# Patient Record
Sex: FEMALE | Race: ASIAN | NOT HISPANIC OR LATINO
[De-identification: names, ages, dates, MRNs, and addresses within clinical notes are randomized per-mention and may not be internally consistent; named-entity substitution may affect disease eponyms.]

---

## 2019-01-14 PROBLEM — Z00.00 ENCOUNTER FOR PREVENTIVE HEALTH EXAMINATION: Status: ACTIVE | Noted: 2019-01-14

## 2019-01-16 ENCOUNTER — APPOINTMENT (OUTPATIENT)
Dept: UROLOGY | Facility: CLINIC | Age: 44
End: 2019-01-16
Payer: COMMERCIAL

## 2019-01-16 VITALS
WEIGHT: 124 LBS | HEART RATE: 79 BPM | SYSTOLIC BLOOD PRESSURE: 172 MMHG | DIASTOLIC BLOOD PRESSURE: 104 MMHG | HEIGHT: 60 IN | BODY MASS INDEX: 24.35 KG/M2

## 2019-01-16 PROCEDURE — 99203 OFFICE O/P NEW LOW 30 MIN: CPT

## 2019-01-16 NOTE — ASSESSMENT
[FreeTextEntry1] : Vesicovaginal Fistula s/p hysterectomy in 11/2018\par Pt reports that she has had a work up done and does not have records with her at this time\par Pt understands that she needs to obtain records from [facility] and [facility] prior to follow up. \par She will need a referral to a urogyn for fistula repair. \par will need cystoscopy

## 2019-01-16 NOTE — HISTORY OF PRESENT ILLNESS
[FreeTextEntry1] : Miss Siegel presents after being seen in [facility] ED and noted to have a vesicovaginal fistula.  On Nov 2nd 2018 the patient had a hysterectomy at [facility].  One month after her surgery she noted some leakage from the vagina. the patient reported that a vesicovaginal fistula was noted on imaging in Dec 2018 .  She had a Sarkar Cath placed in the [facility] ED on Jan 11th 2019 and was refereed to a urologist within the [facility] network who she was told does not take her insurance. She has also had some imaging done at [facility] and [facility] but does not have any of the reports or imaging at this time. Sarkar is currently draining well with leg bag.

## 2019-01-16 NOTE — PHYSICAL EXAM
[General Appearance - Well Developed] : well developed [General Appearance - Well Nourished] : well nourished [Normal Appearance] : normal appearance [] : no respiratory distress [Respiration, Rhythm And Depth] : normal respiratory rhythm and effort [Normal Station and Gait] : the gait and station were normal for the patient's age [No Focal Deficits] : no focal deficits [Oriented To Time, Place, And Person] : oriented to person, place, and time [Affect] : the affect was normal [Not Anxious] : not anxious

## 2019-01-18 ENCOUNTER — APPOINTMENT (OUTPATIENT)
Dept: UROLOGY | Facility: CLINIC | Age: 44
End: 2019-01-18
Payer: COMMERCIAL

## 2019-01-18 ENCOUNTER — APPOINTMENT (OUTPATIENT)
Dept: UROGYNECOLOGY | Facility: CLINIC | Age: 44
End: 2019-01-18
Payer: COMMERCIAL

## 2019-01-18 VITALS — SYSTOLIC BLOOD PRESSURE: 160 MMHG | DIASTOLIC BLOOD PRESSURE: 90 MMHG

## 2019-01-18 VITALS — HEART RATE: 86 BPM | DIASTOLIC BLOOD PRESSURE: 101 MMHG | SYSTOLIC BLOOD PRESSURE: 182 MMHG

## 2019-01-18 DIAGNOSIS — Z87.42 PERSONAL HISTORY OF OTHER DISEASES OF THE FEMALE GENITAL TRACT: ICD-10-CM

## 2019-01-18 DIAGNOSIS — Z82.49 FAMILY HISTORY OF ISCHEMIC HEART DISEASE AND OTHER DISEASES OF THE CIRCULATORY SYSTEM: ICD-10-CM

## 2019-01-18 DIAGNOSIS — Z83.3 FAMILY HISTORY OF DIABETES MELLITUS: ICD-10-CM

## 2019-01-18 DIAGNOSIS — Z86.018 PERSONAL HISTORY OF OTHER BENIGN NEOPLASM: ICD-10-CM

## 2019-01-18 PROCEDURE — 99204 OFFICE O/P NEW MOD 45 MIN: CPT | Mod: 25

## 2019-01-18 PROCEDURE — 52000 CYSTOURETHROSCOPY: CPT

## 2019-01-18 RX ORDER — PHENAZOPYRIDINE HYDROCHLORIDE 100 MG/1
100 TABLET ORAL
Qty: 30 | Refills: 1 | Status: ACTIVE | COMMUNITY
Start: 2019-01-18 | End: 1900-01-01

## 2019-01-18 NOTE — PHYSICAL EXAM
[No Acute Distress] : in no acute distress [Oriented x3] : oriented to person, place, and time [No Edema] : ~T edema was not present [Symmetrical] : the neck was ~L symmetrical [Soft, Nontender] : the abdomen was soft and nontender [None] : no CVA tenderness [Normal Gait] : gait was normal [Labia Majora] : were normal [Labia Minora] : were normal [Bartholin's Gland] : both Bartholin's glands were normal  [Normal Appearance] : general appearance was normal [No Bleeding] : there was no active vaginal bleeding [Normal] : was normal [3] : 3 [Aa ____] : Aa [unfilled] [Ba ____] : Ba [unfilled] [C ____] : C [unfilled] [GH ____] : GH [unfilled] [PB ____] : PB [unfilled] [TVL ____] : TVL  [unfilled] [Ap ____] : Ap [unfilled] [Bp ____] : Bp [unfilled] [] : 0 [Absent] : absent [Soft] :  the cervix was soft [Exam Deferred] : was deferred [Cough] : no cough [Tenderness] : ~T no ~M abdominal tenderness observed [Distended] : not distended [Inguinal LAD] : no adenopathy was noted in the inguinal lymph nodes [FreeTextEntry3] : 16 [facility] Sarkar in  [FreeTextEntry4] : cuff palpable, sutures at cuff palpable; 200 cc methylene blue-tinged sterile water instilled into bladder via cath - +leakage blue fluid per vagina however cannot visualize location - emanating superiorly from apex/cuff [de-identified] : cystourethroscopy performed - urethra normal, bladder with connection / fistula noted  about 1 cm medial to left UO, during cysto palpated vaginal cuff and fistula appeared superior to cuff/at level of cuff but not distinctly palpable from vagina

## 2019-01-18 NOTE — ASSESSMENT
[FreeTextEntry1] : Maximiliano Siegel is a pleasant 42 yo P3, PSHx includes  x 3 and FLORENTINO performed 18, now with vesicovaginal fistula, and Perez cath to leg-bag drainage in situ. Fistula definition discussed and vesicovaginal fistula described with use of pictures. Prior imaging reports reviewed, as noted in history above. The fistula appears superior and at the level of the left vaginal cuff. I recommend indwelling catheter management until 12 weeks postop (2019) to allow for surgical reduction in inflammation and granulation. We discussed that it is possible fistula could close on own while catheter allows for drainage. Therefore, I will continue catheter care, recommending change of cath at the 4 week mack - she should RTO at this time for cath change. At that time, she will obtain a CT urogram - Rx provided today so that insurance auth can be worked on now. She will obtain operative report from [facility] procedure in 2018. Finally, based on the CT findings from 2018, I am concerned with potential for colon involvement (although there is no stool per vagina) at the potential surgical site, and therefore suggest she be seen by CRS for eval and assistance (? colon involvement to vaginal cuff/bladder, ? omental flap at the time of surgical intervention and repair of VVF abdominally). Referral info for Dr. Dillon and her colleagues Dr. Kumar, Dr. Abreu provided for patient to obtain appt in the next month.\par \par I anticipate she will need surgical intervention, in Feb-March, likely robotic abdominal vesicovaginal fistula repair. All questions answered.\par \par Plan:\par [] obtain FLORENTINO op report from [facility] 18\par [] CRS \par [] RTO 1 month for perez change and obtain CT at that time

## 2019-01-18 NOTE — HISTORY OF PRESENT ILLNESS
[FreeTextEntry1] : Presents for vesicovaginal fistula evaluation. Underwent FLORENTINO (no BSO, via vertical midline incision - op report not present today) on 11/2/18 at [place] for 16cm fibroid uterus. Was fine x 1 month, then in Dec reports vaginal leakage of fluid and leakage of urine, insensible loss mostly per vagina, clear / whitish. No pus, no blood. No fever/chlls. +Urge with full bladder, voiding freely and completely, however additional leakage per vagina. Prior history of 3 c-sections as well, 1 via vertial midline incision, 2 via Pfannenstiel.\par \par Sarkar placed in [facility] ED 1/11/19 to leg-bag draining, is currently draining well. Was seen this week by Dr. Saeed of Urology who performed a cysto today showing normal urethra, normal bilateral UO location and jets, +vesicovaginal fistula left posterior wall proximal to trigone.\par \par Imaging reports present today:\par \par CT 11/22/18 (postop - FLORENTINO was on 11/2/18): tubular structure with enhanced walls from left ovary to vaginal cuff 5 x 1cm with fat, and extending to left side of urinary bladder, involving the sigmoid colon.\par \par CT pelvis with and without contrast 1/11/19 at [facility]'s: 5 cm right adnexal cyst, extravasation of contrast from bladder at left posterolateral wall via 5 x 2 cm defect.

## 2019-01-29 ENCOUNTER — APPOINTMENT (OUTPATIENT)
Dept: UROGYNECOLOGY | Facility: CLINIC | Age: 44
End: 2019-01-29
Payer: COMMERCIAL

## 2019-01-29 VITALS
OXYGEN SATURATION: 98 % | HEART RATE: 64 BPM | TEMPERATURE: 98.2 F | DIASTOLIC BLOOD PRESSURE: 87 MMHG | SYSTOLIC BLOOD PRESSURE: 138 MMHG

## 2019-01-29 PROCEDURE — 99213 OFFICE O/P EST LOW 20 MIN: CPT

## 2019-01-29 RX ORDER — NITROFURANTOIN MACROCRYSTALS 50 MG/1
50 CAPSULE ORAL
Qty: 30 | Refills: 3 | Status: ACTIVE | COMMUNITY
Start: 2019-01-29 | End: 1900-01-01

## 2019-01-29 NOTE — HISTORY OF PRESENT ILLNESS
[FreeTextEntry1] : This 44 yo woman presents with a history of 3 C/Sections followed by a FLORENTINO (midline incision) 11/2/18 at [place] for large fibroid uterus.  She presented here 1/18/19 with leakage of urine through the vagina with Perez in Situ.  She reports that perez is draining well and that she saw strands of red in her urine yesterday and earlier today.  She photographed the image.  In addition she reports that her labia and introitus is irritated.  She was prescribed Pyridium which would cost $80 for 30 tablets so she purchased AZO.

## 2019-01-29 NOTE — DISCUSSION/SUMMARY
[FreeTextEntry1] : Sarkar draining clear yellow urine, no threads obvious.  Will Rx Urelle, instructed patient that it is expensive, but it is worth checking the cost.  Instructed in use of Desitin to soothe vulva, perineum and introitus.  Reviewed treatment plan for immediate future.  Dr Garza came in to speak to the patient.  We will leave the current catheter in situ as it appears to be draining well anf that the excess urine is leaking through the fistula.  Will order a CT Urogram with and without contrast.  Rx Macrodantin 50 mg 1 PO daily for coverage during catheter placement.  RTC X 2 weeks for catheter change.

## 2019-01-31 ENCOUNTER — RESULT REVIEW (OUTPATIENT)
Age: 44
End: 2019-01-31

## 2019-02-01 ENCOUNTER — APPOINTMENT (OUTPATIENT)
Dept: COLORECTAL SURGERY | Facility: CLINIC | Age: 44
End: 2019-02-01
Payer: COMMERCIAL

## 2019-02-01 VITALS
RESPIRATION RATE: 14 BRPM | SYSTOLIC BLOOD PRESSURE: 137 MMHG | BODY MASS INDEX: 24.35 KG/M2 | HEIGHT: 60 IN | HEART RATE: 83 BPM | WEIGHT: 124 LBS | DIASTOLIC BLOOD PRESSURE: 91 MMHG

## 2019-02-01 DIAGNOSIS — I10 ESSENTIAL (PRIMARY) HYPERTENSION: ICD-10-CM

## 2019-02-01 PROCEDURE — 99204 OFFICE O/P NEW MOD 45 MIN: CPT

## 2019-02-01 NOTE — DATA REVIEWED
[FreeTextEntry1] : I have reviewed her CT reports and medical records from [facility]. CT images disc were obtained and would be uploaded into the system.

## 2019-02-01 NOTE — CONSULT LETTER
[Dear  ___] : Dear  [unfilled], [Consult Letter:] : I had the pleasure of evaluating your patient, [unfilled]. [Please see my note below.] : Please see my note below. [Consult Closing:] : Thank you very much for allowing me to participate in the care of this patient.  If you have any questions, please do not hesitate to contact me. [Sincerely,] : Sincerely, [FreeTextEntry3] : Tae Dillon MD\par  [DrKenzie  ___] : Dr. CARMONA [DrKenzie ___] : Dr. CARMONA

## 2019-02-01 NOTE — HISTORY OF PRESENT ILLNESS
[FreeTextEntry1] :  43 year old female who presents for consultation. She has history of an abdominal hysterectomy performed at [facility] on November 2018. This was complicated by development of a vesicovaginal fistula for which she is being managed with an indwelling Sarkar catheter. She had a CT urogram on January 11 which showed contrast extravasation at the left posterior lateral margin of the urinary bladder. There were no abnormalities on the colon on this study. She is here for consultation for colonic evaluation as part of preoperative planning for repair of the fistula. \par \par On a previous CT scan dated November 22, 2018, the report mentioned a tubular shaped structure with enhancing margins extending from the left ovary to the vaginal cuff and surgical bed deep within the pelvis representing a post operative collection/abscess. Fat stranding around this structure extending to the left side of the urinary bladder and involving the sigmoid colon. She is having regular bowel movements. No blood in her stool. No fecaluria or stool drainage from her vagina.

## 2019-02-01 NOTE — PHYSICAL EXAM
[Respiratory Effort] : normal respiratory effort [Normal Rate and Rhythm] : normal rate and rhythm [Calm] : calm [de-identified] : soft, mild right lower quadrant tenderness, lower midline and Pfannenstiel incisions noted. [de-identified] : in no distress [de-identified] : normocephalic, atraumatic [de-identified] : moves extremities without difficulty [de-identified] : warm and dry [de-identified] : alert and oriented x3

## 2019-02-13 ENCOUNTER — APPOINTMENT (OUTPATIENT)
Dept: UROGYNECOLOGY | Facility: CLINIC | Age: 44
End: 2019-02-13
Payer: COMMERCIAL

## 2019-02-13 PROCEDURE — 99214 OFFICE O/P EST MOD 30 MIN: CPT

## 2019-02-13 NOTE — PHYSICAL EXAM
[No Acute Distress] : in no acute distress [Oriented x3] : oriented to person, place, and time [Labia Majora] : were normal [Labia Minora] : were normal [Normal] : was normal [Normal Appearance] : general appearance was normal [Tenderness] : ~T no ~M abdominal tenderness observed [Distended] : not distended [FreeTextEntry4] : Left proximal most anterior vaginal wall palpable disruption of wall which is contiguous with vaginal cuff, high and difficult to reach digitally, no descent of vaginal walls therefore difficult to visualize or reach this area with speculum exam as well. There is pooling of clear fluid in vagina however the fistula with fluid emanating is not discretely grossly visualized transvaginally.

## 2019-02-13 NOTE — ASSESSMENT
[FreeTextEntry1] : Maximiliano Siegel is a pleasant 44 yo P3, PSHx includes  x 3 and FLORENTINO performed 18, now with vesicovaginal fistula, and Sarkar cath to leg-bag drainage in situ. Sarkar cath changed today without difficulty. Her symptoms of vaginal drainage of fluid have not improved. We reviewed managemnet options of fistula including but not limited to observation with or without catheter or SPT, or surgical intervention. I discussed vaginal repair however due to the high level of the fistula and likely incorporation with the vaginal cuff, as well as difficulty accessing the fistula vaginally due to lack of descent of the vaginal walls, I feel the abdominal approach is necessary and will provide more likely successful outcome. We discussed robotic vs conversion to laparotomy approach of abdominal vesicovaginal fistula repair, likely SHIRA - to be assisted with CRS, potential omental flap or use of ACell graft incorporated into repair (discussed graft comes from porcine bladder), cystourethroscopy, other indicated procedures. I discussed vaginal placement of a catheter or tool in order to better visualize the fistula abdominally. I discussed with her that I plan to schedule the surgery with my partner Dr. Mann for assistance as well due to the complexity of the procedure.\par \par The risks and benefits of surgery were discussed and included: infection, bleeding, hemorrhage, transfusion, blood clot formation, fistula recurrence, dyspareunia, rejection, chronic pain, neuropathy, damage to surrounding structures including but not limited to bowel/ureters/bladder/rectum, leakage of urine, voiding dysfunction, urinary retention, procedure failure, and going home with a catheter. I discussed with her surgical hx, risk of ureteral, bladder, and/or bowel injury requiring resection and/or ostomy is higher, as well as risk of postop ileus or small bowel obstruction. She understood all counseling.\par \par Perioperative care, anesthesia, no eating or drinking after midnight prior to the surgery, and postop precautions were reviewed. All questions were answered. She understood and would like to proceed with surgical correction. Consent was signed and witnessed in the office.\par \par Plan:\par [] appreciate CRS eval, Dr. Dillon to eval with colonoscopy and will be present at time of surgery\par [] await radiologist call to discuss CT from 19 (ureters likely normal however not discussed in report; +VVF)\par [] continue Azo / Urelle while cath in situ, desitin or vaseline to labia\par [] book surgery for 2019 (at least 12 weeks since hysterectomy to allow reduction of inflammation): robotic repair of vesicovaginal fistula / adhesiolysis / potential omental flap or use of Acell graft / possible conversion to laparotomy / other indicated procedures / cystourethroscopy. \par -Will request Dr. Mann's assistance.\par -Acell graft requested for surgery.\par -ENT instruments requested for surgery.\par -tap water enema night before surgery, clears\par \par \par Addendum:\par Discussed CT report with radiologist Dr. Mcguire, there is no direct colovaginal fistula appreciated, and ureters are normal without evidence of ureterovaginal fistula. Colon is adjacent to vaginal cuff. Vesicovaginal fistula is at the level of the vaginal cuff, and at the superior aspect of the bladder midway between b/l ureters. The right ovary is adherent to the cuff as well.

## 2019-02-13 NOTE — PROCEDURE
[Sarkar Cath Change] : change of an indwelling catheter (Sarkar) [Other ___] : [unfilled] [Patient] : the patient [Macrodantin] : Macrodantin [___ Fr Straight Tip] : a [unfilled] in Syrian straight tip catheter [None] : there were no complications with the catheter insertion [Clear] : clear [No Complications] : no complications [Tolerated Well] : the patient tolerated the procedure well [Post procedure instructions and information given] : Post procedure instructions and information were given and reviewed with patient. [1] : 1

## 2019-02-13 NOTE — HISTORY OF PRESENT ILLNESS
[FreeTextEntry1] : Left-sided vesicovaginal fistula (suspect at level of vaginal cuff), s/p hysterectomy 11/2018, symptoms developed 1 month later; now with perez catheter in situ since 2nd week of Jan - last cath change was 1/18/19. Has been using Azo/Urelle for cath associated discomfort which occasionally slightly reduces cath discomfort. Very uncomfortable with catheter. Reports no improvement in leakage of vaginal fluid/urine, clear or yellowish usually, no blood, no pus, no flank pain. Is on Macrodantin 50mg nightly prophylaxis. Normal bowel movements.\par \par Op report from hysterectomy reviewed (scanned into chart) - vertical midline extensive SHIRA / enterolysis FLORENTINO / BS, during procedure bladder was backfilled with blue-tinged fluid to demarcate bladder borders and there were no intraop complications (cysto not performed at completion of procedure).

## 2019-02-15 ENCOUNTER — OUTPATIENT (OUTPATIENT)
Dept: OUTPATIENT SERVICES | Facility: HOSPITAL | Age: 44
LOS: 1 days | End: 2019-02-15
Payer: COMMERCIAL

## 2019-02-15 VITALS
WEIGHT: 125.66 LBS | RESPIRATION RATE: 20 BRPM | HEART RATE: 90 BPM | HEIGHT: 60 IN | TEMPERATURE: 98 F | SYSTOLIC BLOOD PRESSURE: 149 MMHG | DIASTOLIC BLOOD PRESSURE: 94 MMHG

## 2019-02-15 DIAGNOSIS — R93.89 ABNORMAL FINDINGS ON DIAGNOSTIC IMAGING OF OTHER SPECIFIED BODY STRUCTURES: ICD-10-CM

## 2019-02-15 DIAGNOSIS — Z01.818 ENCOUNTER FOR OTHER PREPROCEDURAL EXAMINATION: ICD-10-CM

## 2019-02-15 DIAGNOSIS — N82.0 VESICOVAGINAL FISTULA: ICD-10-CM

## 2019-02-15 DIAGNOSIS — Z90.710 ACQUIRED ABSENCE OF BOTH CERVIX AND UTERUS: Chronic | ICD-10-CM

## 2019-02-15 LAB
ANION GAP SERPL CALC-SCNC: 13 MMOL/L — SIGNIFICANT CHANGE UP (ref 5–17)
BUN SERPL-MCNC: 14 MG/DL — SIGNIFICANT CHANGE UP (ref 8–20)
CALCIUM SERPL-MCNC: 8.7 MG/DL — SIGNIFICANT CHANGE UP (ref 8.6–10.2)
CHLORIDE SERPL-SCNC: 103 MMOL/L — SIGNIFICANT CHANGE UP (ref 98–107)
CO2 SERPL-SCNC: 24 MMOL/L — SIGNIFICANT CHANGE UP (ref 22–29)
CREAT SERPL-MCNC: 0.51 MG/DL — SIGNIFICANT CHANGE UP (ref 0.5–1.3)
GLUCOSE SERPL-MCNC: 138 MG/DL — HIGH (ref 70–115)
HCT VFR BLD CALC: 36 % — LOW (ref 37–47)
HGB BLD-MCNC: 11.2 G/DL — LOW (ref 12–16)
MCHC RBC-ENTMCNC: 21.5 PG — LOW (ref 27–31)
MCHC RBC-ENTMCNC: 31.1 G/DL — LOW (ref 32–36)
MCV RBC AUTO: 69.2 FL — LOW (ref 81–99)
PLATELET # BLD AUTO: 274 K/UL — SIGNIFICANT CHANGE UP (ref 150–400)
POTASSIUM SERPL-MCNC: 4.5 MMOL/L — SIGNIFICANT CHANGE UP (ref 3.5–5.3)
POTASSIUM SERPL-SCNC: 4.5 MMOL/L — SIGNIFICANT CHANGE UP (ref 3.5–5.3)
RBC # BLD: 5.2 M/UL — SIGNIFICANT CHANGE UP (ref 4.4–5.2)
RBC # FLD: 17.8 % — HIGH (ref 11–15.6)
SODIUM SERPL-SCNC: 140 MMOL/L — SIGNIFICANT CHANGE UP (ref 135–145)
WBC # BLD: 8.4 K/UL — SIGNIFICANT CHANGE UP (ref 4.8–10.8)
WBC # FLD AUTO: 8.4 K/UL — SIGNIFICANT CHANGE UP (ref 4.8–10.8)

## 2019-02-15 PROCEDURE — 93010 ELECTROCARDIOGRAM REPORT: CPT

## 2019-02-15 PROCEDURE — 85027 COMPLETE CBC AUTOMATED: CPT

## 2019-02-15 PROCEDURE — 93005 ELECTROCARDIOGRAM TRACING: CPT

## 2019-02-15 PROCEDURE — G0463: CPT

## 2019-02-15 PROCEDURE — 80048 BASIC METABOLIC PNL TOTAL CA: CPT

## 2019-02-15 PROCEDURE — 36415 COLL VENOUS BLD VENIPUNCTURE: CPT

## 2019-02-15 NOTE — H&P PST ADULT - HISTORY OF PRESENT ILLNESS
43 year old female presents with abnormal findings on diagnostic imaging scheduled for a colonoscopy on 2/22/19.

## 2019-02-15 NOTE — H&P PST ADULT - ASSESSMENT
43 year old female presents for screening colonoscopy scheduled on 19.  CAPRINI VTE 2.0 SCORE [CLOT updated 2019]    AGE RELATED RISK FACTORS                                                       MOBILITY RELATED FACTORS  [x ] Age 41-60 years                                            (1 Point)                    [ ] Bed rest                                                        (1 Point)  [ ] Age: 61-74 years                                           (2 Points)                  [ ] Plaster cast                                                   (2 Points)  [ ] Age= 75 years                                              (3 Points)                    [ ] Bed bound for more than 72 hours                 (2 Points)    DISEASE RELATED RISK FACTORS                                               GENDER SPECIFIC FACTORS  [ ] Edema in the lower extremities                       (1 Point)              [ ] Pregnancy                                                     (1 Point)  [ ] Varicose veins                                               (1 Point)                     [ ] Post-partum < 6 weeks                                   (1 Point)             [ ] BMI > 25 Kg/m2                                            (1 Point)                     [ ] Hormonal therapy  or oral contraception          (1 Point)                 [ ] Sepsis (in the previous month)                        (1 Point)               [ ] History of pregnancy complications                 (1 point)  [ ] Pneumonia or serious lung disease                                               [ ] Unexplained or recurrent                     (1 Point)           (in the previous month)                               (1 Point)  [ ] Abnormal pulmonary function test                     (1 Point)                 SURGERY RELATED RISK FACTORS  [ ] Acute myocardial infarction                              (1 Point)               [ ]  Section                                             (1 Point)  [ ] Congestive heart failure (in the previous month)  (1 Point)      [x ] Minor surgery                                                  (1 Point)   [ ] Inflammatory bowel disease                             (1 Point)               [ ] Arthroscopic surgery                                        (2 Points)  [ ] Central venous access                                      (2 Points)                [ ] General surgery lasting more than 45 minutes (2 points)  [ ] Malignancy- Present or previous                   (2 Points)                [ ] Elective arthroplasty                                         (5 points)    [ ] Stroke (in the previous month)                          (5 Points)                                                                                                                                                           HEMATOLOGY RELATED FACTORS                                                 TRAUMA RELATED RISK FACTORS  [ ] Prior episodes of VTE                                     (3 Points)                [ ] Fracture of the hip, pelvis, or leg                       (5 Points)  [ ] Positive family history for VTE                         (3 Points)             [ ] Acute spinal cord injury (in the previous month)  (5 Points)  [ ] Prothrombin 17036 A                                     (3 Points)               [ ] Paralysis  (less than 1 month)                             (5 Points)  [ ] Factor V Leiden                                             (3 Points)                  [ ] Multiple Trauma within 1 month                        (5 Points)  [ ] Lupus anticoagulants                                     (3 Points)                                                           [ ] Anticardiolipin antibodies                               (3 Points)                                                       [ ] High homocysteine in the blood                      (3 Points)                                             [ ] Other congenital or acquired thrombophilia      (3 Points)                                                [ ] Heparin induced thrombocytopenia                  (3 Points)                                     Total Score [      2    ]  OPIOID RISK TOOL    JOSE EDUARDO EACH BOX THAT APPLIES AND ADD TOTALS AT THE END    FAMILY HISTORY OF SUBSTANCE ABUSE                 FEMALE         MALE                                                Alcohol                             [  ]1 pt          [  ]3pts                                               Illegal Drugs                     [  ]2 pts        [  ]3pts                                               Rx Drugs                           [  ]4 pts        [  ]4 pts    PERSONAL HISTORY OF SUBSTANCE ABUSE                                                                                          Alcohol                             [  ]3 pts       [  ]3 pts                                               Illegal Drugs                     [  ]4 pts        [  ]4 pts                                               Rx Drugs                           [  ]5 pts        [  ]5 pts    AGE BETWEEN 16-45 YEARS                                      [  ]1 pt         [  ]1 pt    HISTORY OF PREADOLESCENT   SEXUAL ABUSE                                                             [  ]3 pts        [  ]0pts    PSYCHOLOGICAL DISEASE                     ADD, OCD, Bipolar, Schizophrenia        [  ]2 pts         [  ]2 pts                      Depression                                               [  ]1 pt           [  ]1 pt           SCORING TOTAL   (add numbers and type here)              (0)                                     A score of 3 or lower indicated LOW risk for future opioid abuse  A score of 4 to 7 indicated moderate risk for future opioid abuse  A score of 8 or higher indicates a high risk for opioid abuse

## 2019-02-20 ENCOUNTER — APPOINTMENT (OUTPATIENT)
Dept: UROGYNECOLOGY | Facility: CLINIC | Age: 44
End: 2019-02-20
Payer: COMMERCIAL

## 2019-02-20 PROBLEM — R32 UNSPECIFIED URINARY INCONTINENCE: Chronic | Status: ACTIVE | Noted: 2019-02-15

## 2019-02-20 PROCEDURE — 99213 OFFICE O/P EST LOW 20 MIN: CPT

## 2019-02-20 NOTE — HISTORY OF PRESENT ILLNESS
[FreeTextEntry1] : This 42 yo woman presents with a history of 3 C/Sections followed by a FLORENTINO (midline incision) 11/2/18 at [place] for large fibroid uterus. She presented here 1/18/19 with leakage of urine through the vagina with Perez in Situ. She reports that perez is draining well but she is experiencing vaginal pain, burning and itching.  She states it was terrible yesterday, a little better today..\par \par \par

## 2019-02-20 NOTE — DISCUSSION/SUMMARY
[FreeTextEntry1] : Levator spasm, tender to touch, patient is not aware of tension in the muscle or her actively causing spasm.  She is not using a barrier to protect her tissue, reinforced use of Desitin.  Also could use an NSAID, but has a colonoscopy Friday.  I recommended she wait until after the colonoscopy to use it.  Also, Will Rx Valium 2 mg to take .5 tab or whole tab and  the impact on her pain.  Spoke with Dr Garza .Patient stated her agreement with this plan. Repair surgery scheduled 3/6/19.

## 2019-02-20 NOTE — PHYSICAL EXAM
[Labia Majora] : were normal [Labia Minora] : were normal [Normal] : was normal [Bartholin's Gland] : both Bartholin's glands were normal  [No Bleeding] : there was no active vaginal bleeding [FreeTextEntry3] : Sarkar to SBD [FreeTextEntry4] : pink, + rugae, mild spasm and tenderness across levators, tension and tender to touch.

## 2019-02-21 ENCOUNTER — TRANSCRIPTION ENCOUNTER (OUTPATIENT)
Age: 44
End: 2019-02-21

## 2019-02-22 ENCOUNTER — OUTPATIENT (OUTPATIENT)
Dept: OUTPATIENT SERVICES | Facility: HOSPITAL | Age: 44
LOS: 1 days | End: 2019-02-22
Payer: COMMERCIAL

## 2019-02-22 ENCOUNTER — APPOINTMENT (OUTPATIENT)
Dept: COLORECTAL SURGERY | Facility: HOSPITAL | Age: 44
End: 2019-02-22
Payer: COMMERCIAL

## 2019-02-22 ENCOUNTER — RESULT REVIEW (OUTPATIENT)
Age: 44
End: 2019-02-22

## 2019-02-22 DIAGNOSIS — Z90.710 ACQUIRED ABSENCE OF BOTH CERVIX AND UTERUS: Chronic | ICD-10-CM

## 2019-02-22 DIAGNOSIS — N82.0 VESICOVAGINAL FISTULA: ICD-10-CM

## 2019-02-22 PROCEDURE — 45380 COLONOSCOPY AND BIOPSY: CPT | Mod: PT

## 2019-02-22 PROCEDURE — 88305 TISSUE EXAM BY PATHOLOGIST: CPT

## 2019-02-22 PROCEDURE — 45385 COLONOSCOPY W/LESION REMOVAL: CPT

## 2019-02-22 PROCEDURE — 88305 TISSUE EXAM BY PATHOLOGIST: CPT | Mod: 26

## 2019-02-26 LAB — SURGICAL PATHOLOGY STUDY: SIGNIFICANT CHANGE UP

## 2019-02-28 ENCOUNTER — OTHER (OUTPATIENT)
Age: 44
End: 2019-02-28

## 2019-02-28 ENCOUNTER — OUTPATIENT (OUTPATIENT)
Dept: OUTPATIENT SERVICES | Facility: HOSPITAL | Age: 44
LOS: 1 days | End: 2019-02-28
Payer: COMMERCIAL

## 2019-02-28 VITALS
WEIGHT: 125.66 LBS | RESPIRATION RATE: 16 BRPM | DIASTOLIC BLOOD PRESSURE: 92 MMHG | HEART RATE: 85 BPM | HEIGHT: 60 IN | SYSTOLIC BLOOD PRESSURE: 140 MMHG | TEMPERATURE: 98 F

## 2019-02-28 DIAGNOSIS — Z29.9 ENCOUNTER FOR PROPHYLACTIC MEASURES, UNSPECIFIED: ICD-10-CM

## 2019-02-28 DIAGNOSIS — I10 ESSENTIAL (PRIMARY) HYPERTENSION: ICD-10-CM

## 2019-02-28 DIAGNOSIS — N82.0 VESICOVAGINAL FISTULA: ICD-10-CM

## 2019-02-28 DIAGNOSIS — Z90.710 ACQUIRED ABSENCE OF BOTH CERVIX AND UTERUS: Chronic | ICD-10-CM

## 2019-02-28 DIAGNOSIS — Z01.818 ENCOUNTER FOR OTHER PREPROCEDURAL EXAMINATION: ICD-10-CM

## 2019-02-28 DIAGNOSIS — Z98.891 HISTORY OF UTERINE SCAR FROM PREVIOUS SURGERY: Chronic | ICD-10-CM

## 2019-02-28 LAB
ANION GAP SERPL CALC-SCNC: 11 MMOL/L — SIGNIFICANT CHANGE UP (ref 5–17)
ANISOCYTOSIS BLD QL: SLIGHT — SIGNIFICANT CHANGE UP
APPEARANCE UR: CLEAR — SIGNIFICANT CHANGE UP
APTT BLD: 27.5 SEC — SIGNIFICANT CHANGE UP (ref 27.5–36.3)
BASOPHILS # BLD AUTO: 0 K/UL — SIGNIFICANT CHANGE UP (ref 0–0.2)
BASOPHILS NFR BLD AUTO: 0.1 % — SIGNIFICANT CHANGE UP (ref 0–2)
BILIRUB UR-MCNC: NEGATIVE — SIGNIFICANT CHANGE UP
BLD GP AB SCN SERPL QL: SIGNIFICANT CHANGE UP
BUN SERPL-MCNC: 11 MG/DL — SIGNIFICANT CHANGE UP (ref 8–20)
CALCIUM SERPL-MCNC: 9.4 MG/DL — SIGNIFICANT CHANGE UP (ref 8.6–10.2)
CHLORIDE SERPL-SCNC: 101 MMOL/L — SIGNIFICANT CHANGE UP (ref 98–107)
CO2 SERPL-SCNC: 27 MMOL/L — SIGNIFICANT CHANGE UP (ref 22–29)
COLOR SPEC: YELLOW — SIGNIFICANT CHANGE UP
CREAT SERPL-MCNC: 0.47 MG/DL — LOW (ref 0.5–1.3)
DIFF PNL FLD: NEGATIVE — SIGNIFICANT CHANGE UP
EOSINOPHIL # BLD AUTO: 0.1 K/UL — SIGNIFICANT CHANGE UP (ref 0–0.5)
EOSINOPHIL NFR BLD AUTO: 1.3 % — SIGNIFICANT CHANGE UP (ref 0–6)
EPI CELLS # UR: SIGNIFICANT CHANGE UP
GLUCOSE SERPL-MCNC: 100 MG/DL — SIGNIFICANT CHANGE UP (ref 70–115)
GLUCOSE UR QL: NEGATIVE MG/DL — SIGNIFICANT CHANGE UP
HCT VFR BLD CALC: 36.7 % — LOW (ref 37–47)
HGB BLD-MCNC: 11.8 G/DL — LOW (ref 12–16)
HYPOCHROMIA BLD QL: SLIGHT — SIGNIFICANT CHANGE UP
INR BLD: 0.95 RATIO — SIGNIFICANT CHANGE UP (ref 0.88–1.16)
KETONES UR-MCNC: NEGATIVE — SIGNIFICANT CHANGE UP
LEUKOCYTE ESTERASE UR-ACNC: ABNORMAL
LYMPHOCYTES # BLD AUTO: 1.4 K/UL — SIGNIFICANT CHANGE UP (ref 1–4.8)
LYMPHOCYTES # BLD AUTO: 19.4 % — LOW (ref 20–55)
MCHC RBC-ENTMCNC: 22.5 PG — LOW (ref 27–31)
MCHC RBC-ENTMCNC: 32.2 G/DL — SIGNIFICANT CHANGE UP (ref 32–36)
MCV RBC AUTO: 69.9 FL — LOW (ref 81–99)
MICROCYTES BLD QL: SLIGHT — SIGNIFICANT CHANGE UP
MONOCYTES # BLD AUTO: 0.9 K/UL — HIGH (ref 0–0.8)
MONOCYTES NFR BLD AUTO: 12.3 % — HIGH (ref 3–10)
NEUTROPHILS # BLD AUTO: 4.7 K/UL — SIGNIFICANT CHANGE UP (ref 1.8–8)
NEUTROPHILS NFR BLD AUTO: 66.6 % — SIGNIFICANT CHANGE UP (ref 37–73)
NITRITE UR-MCNC: POSITIVE
PH UR: 6 — SIGNIFICANT CHANGE UP (ref 5–8)
PLAT MORPH BLD: NORMAL — SIGNIFICANT CHANGE UP
PLATELET # BLD AUTO: 227 K/UL — SIGNIFICANT CHANGE UP (ref 150–400)
POTASSIUM SERPL-MCNC: 4.6 MMOL/L — SIGNIFICANT CHANGE UP (ref 3.5–5.3)
POTASSIUM SERPL-SCNC: 4.6 MMOL/L — SIGNIFICANT CHANGE UP (ref 3.5–5.3)
PROT UR-MCNC: NEGATIVE MG/DL — SIGNIFICANT CHANGE UP
PROTHROM AB SERPL-ACNC: 10.9 SEC — SIGNIFICANT CHANGE UP (ref 10–12.9)
RBC # BLD: 5.25 M/UL — HIGH (ref 4.4–5.2)
RBC # FLD: 18.4 % — HIGH (ref 11–15.6)
RBC BLD AUTO: ABNORMAL
SODIUM SERPL-SCNC: 139 MMOL/L — SIGNIFICANT CHANGE UP (ref 135–145)
SP GR SPEC: 1.01 — SIGNIFICANT CHANGE UP (ref 1.01–1.02)
TYPE + AB SCN PNL BLD: SIGNIFICANT CHANGE UP
UROBILINOGEN FLD QL: NEGATIVE MG/DL — SIGNIFICANT CHANGE UP
WBC # BLD: 7 K/UL — SIGNIFICANT CHANGE UP (ref 4.8–10.8)
WBC # FLD AUTO: 7 K/UL — SIGNIFICANT CHANGE UP (ref 4.8–10.8)
WBC UR QL: SIGNIFICANT CHANGE UP

## 2019-02-28 NOTE — H&P PST ADULT - HISTORY OF PRESENT ILLNESS
43 year old female presents to PST states that she had a hysterectomy in November 2018 and she is leaking urine through her vagina since then MRI showed a vesicovaginal fistula.

## 2019-02-28 NOTE — H&P PST ADULT - LAB RESULTS AND INTERPRETATION
3-1-19: UA + ve and urine culture contaminated called Dr. Veronica's office and spoke to Genet the NP ans also faxed the results to the office.

## 2019-02-28 NOTE — H&P PST ADULT - ATTENDING COMMENTS
Pt seen, continues to desire surgical intervention of vesicovaginal fistula and declines observation and non-surg intervention. Fistula is high and complex in nature, plan for EUA / cystosocopy / robotic fistula repair / possible laparotomy / other indicated procedures / possible omental flap and use of acell graft. Risks including but not limited to damage to surrounding structures includuing ureter, bladder, bowel, rectum, nerves and vessels, infection, hemorhage, ostomy, failure, recurrence, incontinence, MI, VTE, going home with a catheter, hemrrohage, transfusion all discussed and she would like to proceed, consent signed and witnessed in the office, questions reviewed and she understood all counseling.

## 2019-02-28 NOTE — H&P PST ADULT - COMMENTS
Depo Provera injection given in right ventrogluteal today. Patient aware to return to clinic in 12 weeks (april 1rst) for next injection. Patient tolerated without incident. 2018

## 2019-02-28 NOTE — H&P PST ADULT - NO PERTINENT FAMILY HISTORY IN FIRST DEGREE RELATIVES OF:
pt denies any  family history of DM, HTN, heart disease, seizures, cancer, heme disorders or auto immune disease

## 2019-02-28 NOTE — H&P PST ADULT - ASSESSMENT
medications reviewed, instructions given on what medications to take and what not to take. Asked the patient to take the Blood pressure medication/ heart medication on DOP.   CAPRINI VTE 2.0 SCORE [CLOT updated 2019]    AGE RELATED RISK FACTORS                                                       MOBILITY RELATED FACTORS  [x ] Age 41-60 years                                            (1 Point)                    [ ] Bed rest                                                        (1 Point)  [ ] Age: 61-74 years                                           (2 Points)                  [ ] Plaster cast                                                   (2 Points)  [ ] Age= 75 years                                              (3 Points)                    [ ] Bed bound for more than 72 hours                 (2 Points)    DISEASE RELATED RISK FACTORS                                               GENDER SPECIFIC FACTORS  [ ] Edema in the lower extremities                       (1 Point)              [ ] Pregnancy                                                     (1 Point)  [ ] Varicose veins                                               (1 Point)                     [ ] Post-partum < 6 weeks                                   (1 Point)             [ ] BMI > 25 Kg/m2                                            (1 Point)                     [ ] Hormonal therapy  or oral contraception          (1 Point)                 [ ] Sepsis (in the previous month)                        (1 Point)               [ ] History of pregnancy complications                 (1 point)  [ ] Pneumonia or serious lung disease                                               [ ] Unexplained or recurrent                     (1 Point)           (in the previous month)                               (1 Point)  [ ] Abnormal pulmonary function test                     (1 Point)                 SURGERY RELATED RISK FACTORS  [ ] Acute myocardial infarction                              (1 Point)               [ ]  Section                                             (1 Point)  [ ] Congestive heart failure (in the previous month)  (1 Point)      [ ] Minor surgery                                                  (1 Point)   [ ] Inflammatory bowel disease                             (1 Point)               [ ] Arthroscopic surgery                                        (2 Points)  [ ] Central venous access                                      (2 Points)                [x ] General surgery lasting more than 45 minutes (2 points)  [ ] Malignancy- Present or previous                   (2 Points)                [ ] Elective arthroplasty                                         (5 points)    [ ] Stroke (in the previous month)                          (5 Points)                                                                                                                                                           HEMATOLOGY RELATED FACTORS                                                 TRAUMA RELATED RISK FACTORS  [ ] Prior episodes of VTE                                     (3 Points)                [ ] Fracture of the hip, pelvis, or leg                       (5 Points)  [ ] Positive family history for VTE                         (3 Points)             [ ] Acute spinal cord injury (in the previous month)  (5 Points)  [ ] Prothrombin 35218 A                                     (3 Points)               [ ] Paralysis  (less than 1 month)                             (5 Points)  [ ] Factor V Leiden                                             (3 Points)                  [ ] Multiple Trauma within 1 month                        (5 Points)  [ ] Lupus anticoagulants                                     (3 Points)                                                           [ ] Anticardiolipin antibodies                               (3 Points)                                                       [ ] High homocysteine in the blood                      (3 Points)                                             [ ] Other congenital or acquired thrombophilia      (3 Points)                                                [ ] Heparin induced thrombocytopenia                  (3 Points)                                     Total Score [      3    ]  OPIOID RISK TOOL    JOSE EDUARDO EACH BOX THAT APPLIES AND ADD TOTALS AT THE END    FAMILY HISTORY OF SUBSTANCE ABUSE                 FEMALE         MALE                                                Alcohol                             [  ]1 pt          [  ]3pts                                               Illegal Drugs                     [  ]2 pts        [  ]3pts                                               Rx Drugs                           [  ]4 pts        [  ]4 pts    PERSONAL HISTORY OF SUBSTANCE ABUSE                                                                                          Alcohol                             [  ]3 pts       [  ]3 pts                                               Illegal Drugs                     [  ]4 pts        [  ]4 pts                                               Rx Drugs                           [  ]5 pts        [  ]5 pts    AGE BETWEEN 16-45 YEARS                                      [  ]1 pt         [  ]1 pt    HISTORY OF PREADOLESCENT   SEXUAL ABUSE                                                             [  ]3 pts        [  ]0pts    PSYCHOLOGICAL DISEASE                     ADD, OCD, Bipolar, Schizophrenia        [  ]2 pts         [  ]2 pts                      Depression                                               [  ]1 pt           [  ]1 pt           SCORING TOTAL   (add numbers and type here)              ( 0)                                     A score of 3 or lower indicated LOW risk for future opioid abuse  A score of 4 to 7 indicated moderate risk for future opioid abuse  A score of 8 or higher indicates a high risk for opioid abuse

## 2019-03-01 ENCOUNTER — OTHER (OUTPATIENT)
Age: 44
End: 2019-03-01

## 2019-03-01 PROBLEM — D21.9 BENIGN NEOPLASM OF CONNECTIVE AND OTHER SOFT TISSUE, UNSPECIFIED: Chronic | Status: INACTIVE | Noted: 2019-02-15 | Resolved: 2019-02-28

## 2019-03-01 PROBLEM — R93.89 ABNORMAL FINDINGS ON DIAGNOSTIC IMAGING OF OTHER SPECIFIED BODY STRUCTURES: Chronic | Status: INACTIVE | Noted: 2019-02-15 | Resolved: 2019-02-28

## 2019-03-01 LAB
CULTURE RESULTS: SIGNIFICANT CHANGE UP
SPECIMEN SOURCE: SIGNIFICANT CHANGE UP

## 2019-03-04 RX ORDER — DIAZEPAM 2 MG/1
2 TABLET ORAL
Qty: 10 | Refills: 0 | Status: ACTIVE | COMMUNITY
Start: 2019-02-20 | End: 1900-01-01

## 2019-03-05 ENCOUNTER — TRANSCRIPTION ENCOUNTER (OUTPATIENT)
Age: 44
End: 2019-03-05

## 2019-03-05 PROBLEM — I10 ESSENTIAL (PRIMARY) HYPERTENSION: Chronic | Status: ACTIVE | Noted: 2019-02-28

## 2019-03-06 ENCOUNTER — APPOINTMENT (OUTPATIENT)
Dept: COLORECTAL SURGERY | Facility: HOSPITAL | Age: 44
End: 2019-03-06
Payer: COMMERCIAL

## 2019-03-06 ENCOUNTER — APPOINTMENT (OUTPATIENT)
Dept: UROGYNECOLOGY | Facility: HOSPITAL | Age: 44
End: 2019-03-06
Payer: COMMERCIAL

## 2019-03-06 ENCOUNTER — INPATIENT (INPATIENT)
Facility: HOSPITAL | Age: 44
LOS: 1 days | Discharge: HOME | DRG: 747 | End: 2019-03-08
Attending: OBSTETRICS & GYNECOLOGY | Admitting: OBSTETRICS & GYNECOLOGY
Payer: COMMERCIAL

## 2019-03-06 ENCOUNTER — TRANSCRIPTION ENCOUNTER (OUTPATIENT)
Age: 44
End: 2019-03-06

## 2019-03-06 ENCOUNTER — RESULT REVIEW (OUTPATIENT)
Age: 44
End: 2019-03-06

## 2019-03-06 VITALS
WEIGHT: 126.1 LBS | SYSTOLIC BLOOD PRESSURE: 126 MMHG | HEART RATE: 82 BPM | HEIGHT: 60 IN | DIASTOLIC BLOOD PRESSURE: 66 MMHG | OXYGEN SATURATION: 99 % | TEMPERATURE: 99 F | RESPIRATION RATE: 16 BRPM

## 2019-03-06 DIAGNOSIS — Z98.891 HISTORY OF UTERINE SCAR FROM PREVIOUS SURGERY: Chronic | ICD-10-CM

## 2019-03-06 DIAGNOSIS — N82.0 VESICOVAGINAL FISTULA: ICD-10-CM

## 2019-03-06 DIAGNOSIS — Z90.710 ACQUIRED ABSENCE OF BOTH CERVIX AND UTERUS: Chronic | ICD-10-CM

## 2019-03-06 LAB — ABO RH CONFIRMATION: SIGNIFICANT CHANGE UP

## 2019-03-06 PROCEDURE — 87086 URINE CULTURE/COLONY COUNT: CPT

## 2019-03-06 PROCEDURE — 85610 PROTHROMBIN TIME: CPT

## 2019-03-06 PROCEDURE — 81001 URINALYSIS AUTO W/SCOPE: CPT

## 2019-03-06 PROCEDURE — 85730 THROMBOPLASTIN TIME PARTIAL: CPT

## 2019-03-06 PROCEDURE — 51900 REPAIR BLADDER/VAGINA LESION: CPT

## 2019-03-06 PROCEDURE — 86901 BLOOD TYPING SEROLOGIC RH(D): CPT

## 2019-03-06 PROCEDURE — 86850 RBC ANTIBODY SCREEN: CPT

## 2019-03-06 PROCEDURE — 36415 COLL VENOUS BLD VENIPUNCTURE: CPT

## 2019-03-06 PROCEDURE — 85027 COMPLETE CBC AUTOMATED: CPT

## 2019-03-06 PROCEDURE — 88304 TISSUE EXAM BY PATHOLOGIST: CPT | Mod: 26

## 2019-03-06 PROCEDURE — G0463: CPT

## 2019-03-06 PROCEDURE — 86900 BLOOD TYPING SEROLOGIC ABO: CPT

## 2019-03-06 PROCEDURE — 44005 FREEING OF BOWEL ADHESION: CPT

## 2019-03-06 PROCEDURE — 51900 REPAIR BLADDER/VAGINA LESION: CPT | Mod: 62

## 2019-03-06 PROCEDURE — 86923 COMPATIBILITY TEST ELECTRIC: CPT

## 2019-03-06 PROCEDURE — 80048 BASIC METABOLIC PNL TOTAL CA: CPT

## 2019-03-06 RX ORDER — NALOXONE HYDROCHLORIDE 4 MG/.1ML
0.1 SPRAY NASAL
Qty: 0 | Refills: 0 | Status: DISCONTINUED | OUTPATIENT
Start: 2019-03-06 | End: 2019-03-07

## 2019-03-06 RX ORDER — DOCUSATE SODIUM 100 MG
100 CAPSULE ORAL THREE TIMES A DAY
Qty: 0 | Refills: 0 | Status: DISCONTINUED | OUTPATIENT
Start: 2019-03-06 | End: 2019-03-08

## 2019-03-06 RX ORDER — ONDANSETRON 8 MG/1
4 TABLET, FILM COATED ORAL ONCE
Qty: 0 | Refills: 0 | Status: DISCONTINUED | OUTPATIENT
Start: 2019-03-06 | End: 2019-03-06

## 2019-03-06 RX ORDER — CEFAZOLIN SODIUM 1 G
2000 VIAL (EA) INJECTION ONCE
Qty: 0 | Refills: 0 | Status: DISCONTINUED | OUTPATIENT
Start: 2019-03-06 | End: 2019-03-06

## 2019-03-06 RX ORDER — ONDANSETRON 8 MG/1
4 TABLET, FILM COATED ORAL EVERY 6 HOURS
Qty: 0 | Refills: 0 | Status: DISCONTINUED | OUTPATIENT
Start: 2019-03-06 | End: 2019-03-07

## 2019-03-06 RX ORDER — CEFAZOLIN SODIUM 1 G
2000 VIAL (EA) INJECTION EVERY 8 HOURS
Qty: 0 | Refills: 0 | Status: COMPLETED | OUTPATIENT
Start: 2019-03-06 | End: 2019-03-07

## 2019-03-06 RX ORDER — CIPROFLOXACIN LACTATE 400MG/40ML
0 VIAL (ML) INTRAVENOUS
Qty: 0 | Refills: 0 | COMMUNITY

## 2019-03-06 RX ORDER — SODIUM CHLORIDE 9 MG/ML
1000 INJECTION, SOLUTION INTRAVENOUS
Qty: 0 | Refills: 0 | Status: DISCONTINUED | OUTPATIENT
Start: 2019-03-06 | End: 2019-03-06

## 2019-03-06 RX ORDER — POLYETHYLENE GLYCOL 3350 17 G/17G
17 POWDER, FOR SOLUTION ORAL DAILY
Qty: 0 | Refills: 0 | Status: DISCONTINUED | OUTPATIENT
Start: 2019-03-07 | End: 2019-03-08

## 2019-03-06 RX ORDER — SODIUM CHLORIDE 9 MG/ML
1000 INJECTION, SOLUTION INTRAVENOUS
Qty: 0 | Refills: 0 | Status: DISCONTINUED | OUTPATIENT
Start: 2019-03-06 | End: 2019-03-08

## 2019-03-06 RX ORDER — ENOXAPARIN SODIUM 100 MG/ML
40 INJECTION SUBCUTANEOUS EVERY 24 HOURS
Qty: 0 | Refills: 0 | Status: DISCONTINUED | OUTPATIENT
Start: 2019-03-07 | End: 2019-03-08

## 2019-03-06 RX ORDER — LISINOPRIL 2.5 MG/1
20 TABLET ORAL DAILY
Qty: 0 | Refills: 0 | Status: DISCONTINUED | OUTPATIENT
Start: 2019-03-06 | End: 2019-03-08

## 2019-03-06 RX ORDER — SODIUM CHLORIDE 9 MG/ML
3 INJECTION INTRAMUSCULAR; INTRAVENOUS; SUBCUTANEOUS EVERY 8 HOURS
Qty: 0 | Refills: 0 | Status: DISCONTINUED | OUTPATIENT
Start: 2019-03-06 | End: 2019-03-06

## 2019-03-06 RX ORDER — FENTANYL CITRATE 50 UG/ML
50 INJECTION INTRAVENOUS
Qty: 0 | Refills: 0 | Status: DISCONTINUED | OUTPATIENT
Start: 2019-03-06 | End: 2019-03-06

## 2019-03-06 RX ORDER — CEFAZOLIN SODIUM 1 G
2000 VIAL (EA) INJECTION ONCE
Qty: 0 | Refills: 0 | Status: DISCONTINUED | OUTPATIENT
Start: 2019-03-06 | End: 2019-03-08

## 2019-03-06 RX ORDER — HYDROMORPHONE HYDROCHLORIDE 2 MG/ML
30 INJECTION INTRAMUSCULAR; INTRAVENOUS; SUBCUTANEOUS
Qty: 0 | Refills: 0 | Status: DISCONTINUED | OUTPATIENT
Start: 2019-03-06 | End: 2019-03-07

## 2019-03-06 RX ADMIN — Medication 100 MILLIGRAM(S): at 22:35

## 2019-03-06 RX ADMIN — HYDROMORPHONE HYDROCHLORIDE 30 MILLILITER(S): 2 INJECTION INTRAMUSCULAR; INTRAVENOUS; SUBCUTANEOUS at 19:21

## 2019-03-06 RX ADMIN — HYDROMORPHONE HYDROCHLORIDE 30 MILLILITER(S): 2 INJECTION INTRAMUSCULAR; INTRAVENOUS; SUBCUTANEOUS at 18:38

## 2019-03-06 RX ADMIN — HYDROMORPHONE HYDROCHLORIDE 30 MILLILITER(S): 2 INJECTION INTRAMUSCULAR; INTRAVENOUS; SUBCUTANEOUS at 17:17

## 2019-03-06 NOTE — PROGRESS NOTE ADULT - ASSESSMENT
44 y/o HD#1 POD#0 s/p exploratory laparoscopy converted to Pfannesteil laparotomy for vesicovaginal fistula repair and lysis of adhesions  -Stable   -Continue post operative care  -: perez in place for remainder of hospital admission, will be discharged with perez   -GI: no flatus of BM as of yet   -DVT ppx: SCDs  -will consider d/c as patient meets post op milestones

## 2019-03-06 NOTE — DISCHARGE NOTE ADULT - CARE PLAN
Principal Discharge DX:	Fistula involving female genital tract  Goal:	s/p repair, rapid recovery  Assessment and plan of treatment:	Patient will continue to medicate for pain, will be discharged home with perez. She has been instructed on perez catheter maintenance. She will present for cystogram in 7d, pending normal results she will have the perez removed the day after. She will be contacted by Dr. Garza's office for scheduled dates. Principal Discharge DX:	Fistula involving female genital tract  Goal:	s/p repair, rapid recovery  Assessment and plan of treatment:	Patient will continue to medicate for pain, will be discharged home with perez. She has been instructed on perez catheter maintenance. She will present for cystogram in 14d, pending normal results she will have the perez removed the following day if TOV successful. She will be contacted by Dr. Garza's office for scheduled dates.

## 2019-03-06 NOTE — BRIEF OPERATIVE NOTE - OPERATION/FINDINGS
EUA: unable to adequately access vesicovaginal fistula; Cystoscopy (beginning of the procedure) +vesicovaginal fistula noted at the posterior midline portion of the bladder distant from b/l ureteral orifices. At the end of the procedure repeat cystoscopy performed- intact bladder with no spillage of fluid into vaginal or abdomen, brisk efflux of clear urine noted from b/l ureteral orifices

## 2019-03-06 NOTE — DISCHARGE NOTE ADULT - CARE PROVIDER_API CALL
Rosalba Garza)  Obstetrics and Gynecology  [address] [place] [address], [address]  [place] [address]  Phone: [phone]  Fax: [phone]  Follow Up Time:

## 2019-03-06 NOTE — DISCHARGE NOTE ADULT - PATIENT PORTAL LINK FT
You can access the [facility] Patient Portal, offered by [facility], by registering with the following website: [email or web address]

## 2019-03-06 NOTE — PROGRESS NOTE ADULT - SUBJECTIVE AND OBJECTIVE BOX
43y Female s/p EUA diagnostic laparoscopy,Exp Laparotomy Repair Vesico Vaginal fistula under GA    T(C): 36.6 (03-06-19 @ 17:51), Max: 37.2 (03-06-19 @ 08:59)  HR: 89 (03-06-19 @ 17:51) (82 - 98)  BP: 99/67 (03-06-19 @ 17:51) (99/67 - 126/66)  RR: 14 (03-06-19 @ 17:51) (12 - 16)  SpO2: 100% (03-06-19 @ 17:51) (99% - 100%)    Nausea/Vomiting: No    Mental Status: Awake and Alert    Hyrdration Status: Adequate    Airway: Patent    Intraop Awareness: No    Pain: Well Controlled    Disposition: To surgical Floor.No complication from Anesthesia

## 2019-03-06 NOTE — BRIEF OPERATIVE NOTE - PRE-OP DX
Abdominal adhesions  03/06/2019    Active  Campos, Shani  Vesicovaginal fistula  03/06/2019    Active  Campos, Shani

## 2019-03-06 NOTE — DISCHARGE NOTE ADULT - HOSPITAL COURSE
42 y/o POD#2 s/p open laparotomy for repair of vesicovaginal fistula and lysis of adhesions. Patient tolerated procedure well and had uncomplicated hospital course after transfer to floors. At time of discharge patient had no difficulty ambulating, tolerating PO diet, and passing flatus. Will be discharged home with perez for 7 days and with follow up care. 44 y/o POD#2 s/p diagnostic LSC / SHIRA / exploratory laparotomy for repair of vesicovaginal fistula / SHIRA / cystoscopy. Patient tolerated procedure well and had uncomplicated hospital course after transfer to floor. At time of discharge patient had no difficulty ambulating, tolerating PO diet without N/V, and passing flatus. Vitals including UOP, clinical exam, and labs were appropriate. Wbc on POD1 was 16 however she had no focal findings and was afebrile. Will be discharged home with perez for 14 days minimal, and scheduled VCUG on 3/26/19. She will subsequently followup in the office on 3/27/19 to go over VCUG results and TOV/perez removal if radiology exam reveals repair is intact. She will go on prophylactic antibiotics, PO pain control OTC motrin plus percocet plus valium prn muscular pain, and avoid straining with bowel regimen colace plus miralax.

## 2019-03-06 NOTE — BRIEF OPERATIVE NOTE - PROCEDURE
<<-----Click on this checkbox to enter Procedure Cystoscopy  03/06/2019    Active  [email or web address]  Vesicovaginal fistula repair  03/06/2019    Active  [email or web address]  Lysis of adhesions  03/06/2019  laparoscopic  Active  [email or web address]

## 2019-03-06 NOTE — DISCHARGE NOTE ADULT - REASON FOR ADMISSION
open laparotomy for vesicovaginal fistula repair and lysis of adhesions admitted for surgical intervention of vesicovaginal fistula; underwent diagnotic LSC / SHIRA / exploratory laparotomy for vesicovaginal fistula repair and lysis of adhesions / cystourethroscopy

## 2019-03-06 NOTE — DISCHARGE NOTE ADULT - PLAN OF CARE
s/p repair, rapid recovery Patient will continue to medicate for pain, will be discharged home with perez. She has been instructed on perez catheter maintenance. She will present for cystogram in 7d, pending normal results she will have the perez removed the day after. She will be contacted by Dr. Garza's office for scheduled dates. Patient will continue to medicate for pain, will be discharged home with perez. She has been instructed on perez catheter maintenance. She will present for cystogram in 14d, pending normal results she will have the perez removed the following day if TOV successful. She will be contacted by Dr. Garza's office for scheduled dates.

## 2019-03-06 NOTE — PROGRESS NOTE ADULT - SUBJECTIVE AND OBJECTIVE BOX
44 y/o HD#1 POD#0 s/p exploratory laparoscopy converted to Pfannesteil laparotomy for vesicovaginal fistula repair and lysis of adhesions by colorectal after FLORENTINO procedure in Nov 2018.     S:  Patient was seen and examined at bedside. The patient is doing well since transfer to floor.  Pain is controlled. Tolerating liquids, denies nausea/vomiting. Sarkar in place. Patient has not ambulated yet. Denies flatus or BM.    O:   T(C): 37.3 (03-06-19 @ 19:55), Max: 37.3 (03-06-19 @ 19:55)  HR: 94 (03-06-19 @ 19:55) (82 - 98)  BP: 107/72 (03-06-19 @ 19:55) (99/61 - 126/66)  RR: 16 (03-06-19 @ 19:55) (12 - 16)  SpO2: 98% (03-06-19 @ 19:55) (98% - 100%)    Abdomen: soft, nondistended, appropriately tender to palpation, + BS   Incision: no dressings in place, incisions clean dry and intact     03-06-19 @ 07:01  -  03-06-19 @ 20:41  --------------------------------------------------------  IN: 190 mL / OUT: 150 mL / NET: 40 mL      ceFAZolin   IVPB 2000 milliGRAM(s) IV Intermittent once  ceFAZolin   IVPB 2000 milliGRAM(s) IV Intermittent every 8 hours  docusate sodium 100 milliGRAM(s) Oral three times a day  HYDROmorphone PCA (1 mG/mL) 30 milliLiter(s) PCA Continuous PCA Continuous  lactated ringers. 1000 milliLiter(s) IV Continuous <Continuous>  lisinopril 20 milliGRAM(s) Oral daily  naloxone Injectable 0.1 milliGRAM(s) IV Push every 3 minutes PRN  ondansetron Injectable 4 milliGRAM(s) IV Push every 6 hours PRN

## 2019-03-07 ENCOUNTER — OTHER (OUTPATIENT)
Age: 44
End: 2019-03-07

## 2019-03-07 LAB
ANION GAP SERPL CALC-SCNC: 15 MMOL/L — SIGNIFICANT CHANGE UP (ref 5–17)
BUN SERPL-MCNC: 8 MG/DL — SIGNIFICANT CHANGE UP (ref 8–20)
CALCIUM SERPL-MCNC: 8.5 MG/DL — LOW (ref 8.6–10.2)
CHLORIDE SERPL-SCNC: 102 MMOL/L — SIGNIFICANT CHANGE UP (ref 98–107)
CO2 SERPL-SCNC: 21 MMOL/L — LOW (ref 22–29)
CREAT SERPL-MCNC: 0.57 MG/DL — SIGNIFICANT CHANGE UP (ref 0.5–1.3)
EOSINOPHIL # BLD AUTO: 0 K/UL — SIGNIFICANT CHANGE UP (ref 0–0.5)
EOSINOPHIL NFR BLD AUTO: 0 % — SIGNIFICANT CHANGE UP (ref 0–6)
GLUCOSE SERPL-MCNC: 164 MG/DL — HIGH (ref 70–115)
HCT VFR BLD CALC: 31.7 % — LOW (ref 37–47)
HGB BLD-MCNC: 9.9 G/DL — LOW (ref 12–16)
LYMPHOCYTES # BLD AUTO: 1.2 K/UL — SIGNIFICANT CHANGE UP (ref 1–4.8)
LYMPHOCYTES # BLD AUTO: 7 % — LOW (ref 20–55)
MCHC RBC-ENTMCNC: 22.2 PG — LOW (ref 27–31)
MCHC RBC-ENTMCNC: 31.2 G/DL — LOW (ref 32–36)
MCV RBC AUTO: 71.1 FL — LOW (ref 81–99)
MONOCYTES # BLD AUTO: 1.5 K/UL — HIGH (ref 0–0.8)
MONOCYTES NFR BLD AUTO: 9.2 % — SIGNIFICANT CHANGE UP (ref 3–10)
NEUTROPHILS # BLD AUTO: 14 K/UL — HIGH (ref 1.8–8)
NEUTROPHILS NFR BLD AUTO: 83.4 % — HIGH (ref 37–73)
PLATELET # BLD AUTO: 240 K/UL — SIGNIFICANT CHANGE UP (ref 150–400)
POTASSIUM SERPL-MCNC: 3.6 MMOL/L — SIGNIFICANT CHANGE UP (ref 3.5–5.3)
POTASSIUM SERPL-SCNC: 3.6 MMOL/L — SIGNIFICANT CHANGE UP (ref 3.5–5.3)
RBC # BLD: 4.46 M/UL — SIGNIFICANT CHANGE UP (ref 4.4–5.2)
RBC # FLD: 19.4 % — HIGH (ref 11–15.6)
SODIUM SERPL-SCNC: 138 MMOL/L — SIGNIFICANT CHANGE UP (ref 135–145)
WBC # BLD: 16.8 K/UL — HIGH (ref 4.8–10.8)
WBC # FLD AUTO: 16.8 K/UL — HIGH (ref 4.8–10.8)

## 2019-03-07 RX ORDER — IBUPROFEN 200 MG
600 TABLET ORAL EVERY 6 HOURS
Qty: 0 | Refills: 0 | Status: DISCONTINUED | OUTPATIENT
Start: 2019-03-07 | End: 2019-03-08

## 2019-03-07 RX ORDER — INFLUENZA VIRUS VACCINE 15; 15; 15; 15 UG/.5ML; UG/.5ML; UG/.5ML; UG/.5ML
0.5 SUSPENSION INTRAMUSCULAR ONCE
Qty: 0 | Refills: 0 | Status: COMPLETED | OUTPATIENT
Start: 2019-03-07 | End: 2019-03-07

## 2019-03-07 RX ORDER — OXYCODONE AND ACETAMINOPHEN 5; 325 MG/1; MG/1
1 TABLET ORAL EVERY 4 HOURS
Qty: 0 | Refills: 0 | Status: DISCONTINUED | OUTPATIENT
Start: 2019-03-07 | End: 2019-03-08

## 2019-03-07 RX ORDER — OXYCODONE AND ACETAMINOPHEN 5; 325 MG/1; MG/1
2 TABLET ORAL EVERY 6 HOURS
Qty: 0 | Refills: 0 | Status: DISCONTINUED | OUTPATIENT
Start: 2019-03-07 | End: 2019-03-08

## 2019-03-07 RX ADMIN — OXYCODONE AND ACETAMINOPHEN 2 TABLET(S): 5; 325 TABLET ORAL at 22:10

## 2019-03-07 RX ADMIN — LISINOPRIL 20 MILLIGRAM(S): 2.5 TABLET ORAL at 06:20

## 2019-03-07 RX ADMIN — OXYCODONE AND ACETAMINOPHEN 2 TABLET(S): 5; 325 TABLET ORAL at 09:52

## 2019-03-07 RX ADMIN — OXYCODONE AND ACETAMINOPHEN 2 TABLET(S): 5; 325 TABLET ORAL at 23:10

## 2019-03-07 RX ADMIN — Medication 100 MILLIGRAM(S): at 12:44

## 2019-03-07 RX ADMIN — INFLUENZA VIRUS VACCINE 0.5 MILLILITER(S): 15; 15; 15; 15 SUSPENSION INTRAMUSCULAR at 22:11

## 2019-03-07 RX ADMIN — ENOXAPARIN SODIUM 40 MILLIGRAM(S): 100 INJECTION SUBCUTANEOUS at 06:02

## 2019-03-07 RX ADMIN — Medication 100 MILLIGRAM(S): at 06:02

## 2019-03-07 RX ADMIN — OXYCODONE AND ACETAMINOPHEN 2 TABLET(S): 5; 325 TABLET ORAL at 15:38

## 2019-03-07 RX ADMIN — Medication 100 MILLIGRAM(S): at 22:10

## 2019-03-07 RX ADMIN — POLYETHYLENE GLYCOL 3350 17 GRAM(S): 17 POWDER, FOR SOLUTION ORAL at 12:43

## 2019-03-07 NOTE — PROGRESS NOTE ADULT - ATTENDING COMMENTS
patient seen and examined, agree with above, POD #1 s/p robotic SHIRA converted to exlap SHIRA repair of vesicovaginal fistula  doing well, tolerating diet, passing flatus  advance reg diet, po meds, home meds    continue to monitor with regular diet

## 2019-03-07 NOTE — PROGRESS NOTE ADULT - ASSESSMENT
POD 1 s/p repair of vesicovaginal fistula, lysis of adhesions and cystoscopy. She is doing well. I discussed that no bowel resection was needed, just extensive lysis of adhesions. Cares per urogyn.

## 2019-03-07 NOTE — PROGRESS NOTE ADULT - SUBJECTIVE AND OBJECTIVE BOX
44 y/o HD#2 POD#1 s/p exploratory laparoscopy converted to Pfannesteil laparotomy for vesicovaginal fistula repair and lysis of adhesions by colorectal after FLORENTINO procedure in Nov 2018.     S:  Patient was seen and examined at bedside. The patient is doing well, no acute events overnight.  Pain is controlled. Tolerating liquids, denies nausea/vomiting. Sarkar in place. Patient was being prepped to ambulate with assistance during exam. +Flatus. -BM. Denies fevers, chills, SOB, chest pain, palpitations, lightheadedness, weakness, fatigue, or feeling faint.     O:   Vital Signs Last 24 Hrs  T(C): 36.7 (07 Mar 2019 04:15), Max: 37.3 (06 Mar 2019 19:55)  T(F): 98.1 (07 Mar 2019 04:15), Max: 99.1 (06 Mar 2019 19:55)  HR: 103 (07 Mar 2019 04:15) (82 - 103)  BP: 102/70 (07 Mar 2019 04:15) (99/61 - 126/66)  RR: 18 (07 Mar 2019 04:15) (12 - 18)  SpO2: 98% (07 Mar 2019 04:15) (98% - 100%)    Lungs: CTAB   Abdomen: firm, nondistended, appropriately tender to palpation   Incision: no dressings in place, incisions clean dry and intact     UOP: approx 100cc/hr overnight   IS: able to inspire to 1500    ceFAZolin   IVPB 2000 milliGRAM(s) IV Intermittent once  ceFAZolin   IVPB 2000 milliGRAM(s) IV Intermittent every 8 hours  docusate sodium 100 milliGRAM(s) Oral three times a day  HYDROmorphone PCA (1 mG/mL) 30 milliLiter(s) PCA Continuous PCA Continuous  lactated ringers. 1000 milliLiter(s) IV Continuous <Continuous>  lisinopril 20 milliGRAM(s) Oral daily  naloxone Injectable 0.1 milliGRAM(s) IV Push every 3 minutes PRN  ondansetron Injectable 4 milliGRAM(s) IV Push every 6 hours PRN

## 2019-03-07 NOTE — PROGRESS NOTE ADULT - SUBJECTIVE AND OBJECTIVE BOX
Feels well today. Pain well controlled. Tolerating liquids    Exam:  Vital Signs Last 24 Hrs  T(C): 36.9 (07 Mar 2019 09:24), Max: 37.3 (06 Mar 2019 19:55)  T(F): 98.4 (07 Mar 2019 09:24), Max: 99.1 (06 Mar 2019 19:55)  HR: 103 (07 Mar 2019 09:24) (84 - 103)  BP: 112/73 (07 Mar 2019 09:24) (99/61 - 121/71)  RR: 18 (07 Mar 2019 09:24) (12 - 18)  SpO2: 100% (07 Mar 2019 09:24) (98% - 100%)    In no distress   Abdomen soft, appropriately tender, non distended. Incision clean and intact                          9.9    16.8  )-----------( 240      ( 07 Mar 2019 07:21 )             31.7   03-07    138  |  102  |  8.0  ----------------------------<  164<H>  3.6   |  21.0<L>  |  0.57    Ca    8.5<L>      07 Mar 2019 07:21

## 2019-03-08 VITALS
HEART RATE: 78 BPM | DIASTOLIC BLOOD PRESSURE: 74 MMHG | SYSTOLIC BLOOD PRESSURE: 109 MMHG | RESPIRATION RATE: 18 BRPM | TEMPERATURE: 98 F

## 2019-03-08 RX ORDER — NITROFURANTOIN MACROCRYSTAL 50 MG
1 CAPSULE ORAL
Qty: 0 | Refills: 0 | COMMUNITY

## 2019-03-08 RX ORDER — LISINOPRIL 2.5 MG/1
1 TABLET ORAL
Qty: 0 | Refills: 0 | COMMUNITY

## 2019-03-08 RX ADMIN — Medication 100 MILLIGRAM(S): at 18:05

## 2019-03-08 RX ADMIN — OXYCODONE AND ACETAMINOPHEN 2 TABLET(S): 5; 325 TABLET ORAL at 13:30

## 2019-03-08 RX ADMIN — OXYCODONE AND ACETAMINOPHEN 2 TABLET(S): 5; 325 TABLET ORAL at 06:09

## 2019-03-08 RX ADMIN — Medication 100 MILLIGRAM(S): at 06:09

## 2019-03-08 RX ADMIN — POLYETHYLENE GLYCOL 3350 17 GRAM(S): 17 POWDER, FOR SOLUTION ORAL at 16:19

## 2019-03-08 RX ADMIN — LISINOPRIL 20 MILLIGRAM(S): 2.5 TABLET ORAL at 06:09

## 2019-03-08 RX ADMIN — OXYCODONE AND ACETAMINOPHEN 2 TABLET(S): 5; 325 TABLET ORAL at 06:45

## 2019-03-08 RX ADMIN — ENOXAPARIN SODIUM 40 MILLIGRAM(S): 100 INJECTION SUBCUTANEOUS at 06:09

## 2019-03-08 RX ADMIN — OXYCODONE AND ACETAMINOPHEN 2 TABLET(S): 5; 325 TABLET ORAL at 13:00

## 2019-03-08 NOTE — PROGRESS NOTE ADULT - SUBJECTIVE AND OBJECTIVE BOX
Urogyn Attd:    Pt seen and examined bedside at 7am today. +OOB, betty reg diet no N/V, +flatus. Pain controlled. No subj fever/chills, no dizziness/palpitations/SOB/HA.     57 kg  98.6 (afeb), 86, 119/82, 18, UOP 1100/12 hr shift  NAD, A&O x 3  Abd soft, ND, minimally tender to palpation appropriately, no R/G, no CVAT b/l  Cath in   No active VB or discharge/drainage  No CT b/l  Incisions C/D/I - LSC x 3 and pfannenstiel with dermabond glue    3/7 labs:  H/H 10/32  Wbc 16  Cr 0.6    POD2 s/p diagnostic LSC / SHIRA / exlap / SHIRA / repair of vesicovaginal fistula / cystoscopy, with CRS assist.  1. Heme: Hemodynamically stable, no clinical suspicion of bleeding, DVT prophy Lovenox.  2. GI: reg diet, colace miralax bowel regimen to avoid straining.  3. : cath in x 14 d minimum --> VCUG on 3/26 --> to f/u in office for TOV 3/27, pyridium prn, valium prn - rx sent.  4. Pain: PO control, ibuprofen, Rx to pharmacy sent via EMR for percocet.  5. Routine home meds.  6. ID: [id number] yesterday POD1, no focal signs or symptoms, cath in, precautions reviewed, ISS; abx prophy while cath in - sent to pharm via EMR, bactrim prophylaxis (was on nitrofurantoin prior, switching).  7. DC home in PM if well.    Melinda Garza MD  [phone] (cell)

## 2019-03-11 LAB — SURGICAL PATHOLOGY STUDY: SIGNIFICANT CHANGE UP

## 2019-03-12 ENCOUNTER — CLINICAL ADVICE (OUTPATIENT)
Age: 44
End: 2019-03-12

## 2019-03-25 ENCOUNTER — OUTPATIENT (OUTPATIENT)
Dept: OUTPATIENT SERVICES | Facility: HOSPITAL | Age: 44
LOS: 1 days | End: 2019-03-25
Payer: COMMERCIAL

## 2019-03-25 DIAGNOSIS — Z90.710 ACQUIRED ABSENCE OF BOTH CERVIX AND UTERUS: Chronic | ICD-10-CM

## 2019-03-25 DIAGNOSIS — Z96.0 PRESENCE OF UROGENITAL IMPLANTS: ICD-10-CM

## 2019-03-25 DIAGNOSIS — Z98.890 OTHER SPECIFIED POSTPROCEDURAL STATES: ICD-10-CM

## 2019-03-25 DIAGNOSIS — N82.0 VESICOVAGINAL FISTULA: ICD-10-CM

## 2019-03-25 DIAGNOSIS — Z98.891 HISTORY OF UTERINE SCAR FROM PREVIOUS SURGERY: Chronic | ICD-10-CM

## 2019-03-25 PROCEDURE — 74430 CONTRAST X-RAY BLADDER: CPT

## 2019-03-25 PROCEDURE — 51600 INJECTION FOR BLADDER X-RAY: CPT

## 2019-03-25 PROCEDURE — 74430 CONTRAST X-RAY BLADDER: CPT | Mod: 26

## 2019-03-26 ENCOUNTER — RESULT REVIEW (OUTPATIENT)
Age: 44
End: 2019-03-26

## 2019-03-26 ENCOUNTER — OTHER (OUTPATIENT)
Age: 44
End: 2019-03-26

## 2019-03-27 ENCOUNTER — APPOINTMENT (OUTPATIENT)
Dept: UROGYNECOLOGY | Facility: CLINIC | Age: 44
End: 2019-03-27
Payer: COMMERCIAL

## 2019-03-27 ENCOUNTER — OUTPATIENT (OUTPATIENT)
Dept: OUTPATIENT SERVICES | Facility: HOSPITAL | Age: 44
LOS: 1 days | End: 2019-03-27
Payer: COMMERCIAL

## 2019-03-27 DIAGNOSIS — N82.0 VESICOVAGINAL FISTULA: ICD-10-CM

## 2019-03-27 DIAGNOSIS — Z90.710 ACQUIRED ABSENCE OF BOTH CERVIX AND UTERUS: Chronic | ICD-10-CM

## 2019-03-27 DIAGNOSIS — Z96.0 PRESENCE OF UROGENITAL IMPLANTS: ICD-10-CM

## 2019-03-27 DIAGNOSIS — Z98.891 HISTORY OF UTERINE SCAR FROM PREVIOUS SURGERY: Chronic | ICD-10-CM

## 2019-03-27 DIAGNOSIS — Z98.890 OTHER SPECIFIED POSTPROCEDURAL STATES: ICD-10-CM

## 2019-03-27 PROCEDURE — 72192 CT PELVIS W/O DYE: CPT

## 2019-03-27 PROCEDURE — 72192 CT PELVIS W/O DYE: CPT | Mod: 26

## 2019-03-29 ENCOUNTER — APPOINTMENT (OUTPATIENT)
Dept: UROGYNECOLOGY | Facility: CLINIC | Age: 44
End: 2019-03-29
Payer: COMMERCIAL

## 2019-03-29 DIAGNOSIS — Z86.19 PERSONAL HISTORY OF OTHER INFECTIOUS AND PARASITIC DISEASES: ICD-10-CM

## 2019-03-29 PROCEDURE — 99024 POSTOP FOLLOW-UP VISIT: CPT

## 2019-03-29 RX ORDER — HYOSCYAMINE SULFATE, METHENAMINE, METHYLENE BLUE, PHENYL SALICYLATE, AND SODIUM PHOSPHATE, MONOBASIC, MONOHYDRATE .12; 81; 10.8; 32.4; 40.8 MG/1; MG/1; MG/1; MG/1; MG/1
81 TABLET ORAL 3 TIMES DAILY
Qty: 30 | Refills: 1 | Status: DISCONTINUED | COMMUNITY
Start: 2019-01-29 | End: 2019-03-29

## 2019-03-29 RX ORDER — SULFAMETHOXAZOLE AND TRIMETHOPRIM 800; 160 MG/1; MG/1
800-160 TABLET ORAL DAILY
Qty: 30 | Refills: 0 | Status: ACTIVE | COMMUNITY
Start: 2019-03-29 | End: 1900-01-01

## 2019-03-29 RX ORDER — FLUCONAZOLE 150 MG/1
150 TABLET ORAL
Qty: 1 | Refills: 1 | Status: ACTIVE | COMMUNITY
Start: 2019-03-29 | End: 1900-01-01

## 2019-03-29 RX ORDER — OXYCODONE AND ACETAMINOPHEN 5; 325 MG/1; MG/1
5-325 TABLET ORAL
Qty: 20 | Refills: 0 | Status: DISCONTINUED | COMMUNITY
Start: 2019-03-04 | End: 2019-03-29

## 2019-03-29 RX ORDER — MICONAZOLE NITRATE 20 MG/G
2 CREAM TOPICAL TWICE DAILY
Qty: 1 | Refills: 0 | Status: ACTIVE | COMMUNITY
Start: 2019-03-29 | End: 1900-01-01

## 2019-03-29 RX ORDER — SULFAMETHOXAZOLE AND TRIMETHOPRIM 800; 160 MG/1; MG/1
800-160 TABLET ORAL DAILY
Qty: 14 | Refills: 0 | Status: DISCONTINUED | COMMUNITY
Start: 2019-03-08 | End: 2019-03-29

## 2019-03-29 NOTE — OBJECTIVE
[Soft and Nontender] : soft and nontender [Clean, Dry, Intact] : Clean, Dry, Intact [FreeTextEntry2] : solomon

## 2019-03-29 NOTE — SUBJECTIVE
[FreeTextEntry1] : no vaginal bleeding, no vaginal discharge, +vaginal itch to labia [FreeTextEntry7] : catheter discomfort [FreeTextEntry6] : normal, no N/V [FreeTextEntry4] : normal without straining, no diarrhea [FreeTextEntry9] : no fever/chills; no flank pain

## 2019-03-29 NOTE — DISCUSSION/SUMMARY
[FreeTextEntry1] : 3+ weeks postop s/p robotic converted to open extensive SHIRA / vesicovaginal fistula repair / cystourethroscopy. Imaging this week c/w small residual vesicovaginal connection - posteiror superior bladder 0.5cm tract to vaginal cuff/apex, only noted on evacuation phase of imaging, and as discussed with reading radiologist. 14 Nepali perez cath changed today. Continue abx prophylaxis while cath in situ, switch to Bactrim ss once daily - rx provided, no allergies. Continue desitin and vaseline to vulva. No lesions/mass/rash at pruritic area of labia. Rx miconazole prn yaginitis to use. Pt requested statlocks, no rx available to provide patient.\par \par Plan is to repeat imaging in 3 weeks, then reassess for ? cath removal if tract healed. Risk of further breakdown and/or failure to fully close and heal discussed. Reassess in 1 month. Reassurance provided as she is quite diappointed that we are unable to remove perez today. Her most bothersome issue is the labial discomfort and itch. She has been bedresting, and I have encouraged her to OOB to prevent deconditioning and avoid VTE. All questions answered. \par \par 1 hour spent with the patient explaining condition, surgery, and plan.\par \par Plan:\par [] switch from nitrofurantoin to bactrim uti prophylaxis\par [] cath changed today, continue another month\par [] plan to repeat ct cystogram/urogram in 3-4 weeks prior to next visit\par [] miconazole prn vaginal itch - no rash, no lesions, continue desitin and vaseline as well\par [] statlocks requested, no rx in pharmacy

## 2019-04-04 ENCOUNTER — OTHER (OUTPATIENT)
Age: 44
End: 2019-04-04

## 2019-04-19 ENCOUNTER — APPOINTMENT (OUTPATIENT)
Dept: UROGYNECOLOGY | Facility: CLINIC | Age: 44
End: 2019-04-19
Payer: COMMERCIAL

## 2019-04-19 PROCEDURE — 99024 POSTOP FOLLOW-UP VISIT: CPT

## 2019-04-19 RX ORDER — ESTRADIOL 0.1 MG/G
0.1 CREAM VAGINAL
Qty: 1 | Refills: 0 | Status: ACTIVE | COMMUNITY
Start: 2019-02-25 | End: 1900-01-01

## 2019-04-19 NOTE — DISCUSSION/SUMMARY
[Risks/Benefits discussed. Pt/family verbalizes understanding] : risks and benefits of the procedure were discussed with the patient/family who verbalize understanding [Post-Op instructions given. Pt/family verbalizes understanding] : post-operative instructions were provided to the patient/family who verbalize understanding [FreeTextEntry1] : 6 weeks postop s/p robotic converted to open extensive SHIRA / vesicovaginal fistula repair / cystourethroscopy. Case done in conjunction with colorectal surgery. Leg-bag perez changed today, confirmed draining. Repeat imaging scheduled for next Mon 4/22, and has followup with me 4/24. Based on her recent onset of dripping per vagina, I suspect that the fistulous tract is not closed. We discussed continued drainage however unlikely to close based on size and time course. Surgical intervention via abd vs vag route, and with the use of bridging graft material such as Acell discussed. She understood. I have provided her with specialist referral info who cares for fistulas in detail. She has an appt for May, however forgot with whom or which institution. I will provide referral if needed and would like to discuss the case with that person as well. \par \par She is more OOB now, encouraged to continue, for DVT prophylaxis. \par \par Plan:\par [] continue abx uti prophylaxis\par [] cath changed today\par [] f/u CT urogram next Mon\par [] topical estrogen\par [] statlocks provided\par [] consider office cysto

## 2019-04-19 NOTE — SUBJECTIVE
[FreeTextEntry1] : presents today due to residue in cath tubing she was concerned with, no hematuria, no fever/chills, no flank pain, no other changes [FreeTextEntry6] : normal, no N/V [FreeTextEntry4] : no straining [FreeTextEntry9] : since 4 days prior, now reports small drips leakage of fluid per vagina - noted when sits on toilet - this helps urine drain into perez, however drainage from vagina noted at this time - this is new issue and prior to 4 days ago, she reported no drainage per vagina

## 2019-04-19 NOTE — OBJECTIVE
[Soft and Nontender] : soft and nontender [Clean, Dry, Intact] : Clean, Dry, Intact [FreeTextEntry3] : palpable proximal anterior vaginal wall 1cm defect, no continuous pasasge or gross fistula to bladder however palpable dimpling - this is similar region to prior VVF repair just proximal to vaginal cuff; no drainage of fluid or discharge in vagina noted

## 2019-04-22 ENCOUNTER — OUTPATIENT (OUTPATIENT)
Dept: OUTPATIENT SERVICES | Facility: HOSPITAL | Age: 44
LOS: 1 days | End: 2019-04-22
Payer: COMMERCIAL

## 2019-04-22 ENCOUNTER — OTHER (OUTPATIENT)
Age: 44
End: 2019-04-22

## 2019-04-22 DIAGNOSIS — Z98.891 HISTORY OF UTERINE SCAR FROM PREVIOUS SURGERY: Chronic | ICD-10-CM

## 2019-04-22 DIAGNOSIS — N39.0 URINARY TRACT INFECTION, SITE NOT SPECIFIED: ICD-10-CM

## 2019-04-22 DIAGNOSIS — Z90.710 ACQUIRED ABSENCE OF BOTH CERVIX AND UTERUS: Chronic | ICD-10-CM

## 2019-04-22 DIAGNOSIS — Z96.0 PRESENCE OF UROGENITAL IMPLANTS: ICD-10-CM

## 2019-04-22 DIAGNOSIS — R10.9 UNSPECIFIED ABDOMINAL PAIN: ICD-10-CM

## 2019-04-22 DIAGNOSIS — R10.2 PELVIC AND PERINEAL PAIN: ICD-10-CM

## 2019-04-22 DIAGNOSIS — N82.0 VESICOVAGINAL FISTULA: ICD-10-CM

## 2019-04-22 PROCEDURE — 72192 CT PELVIS W/O DYE: CPT

## 2019-04-22 PROCEDURE — 72192 CT PELVIS W/O DYE: CPT | Mod: 26

## 2019-04-24 ENCOUNTER — APPOINTMENT (OUTPATIENT)
Dept: UROGYNECOLOGY | Facility: CLINIC | Age: 44
End: 2019-04-24
Payer: COMMERCIAL

## 2019-04-24 DIAGNOSIS — Z96.0 PRESENCE OF UROGENITAL IMPLANTS: ICD-10-CM

## 2019-04-24 PROCEDURE — 99024 POSTOP FOLLOW-UP VISIT: CPT

## 2019-04-24 NOTE — SUBJECTIVE
[FreeTextEntry1] : no fever/chills [FreeTextEntry7] : perez cath discomfort [FreeTextEntry6] : normal, no N/V [FreeTextEntry5] : no hematuria, clear urine in bag [FreeTextEntry4] : normal, no diarrhea, no straining or constipation [FreeTextEntry9] : vaginal drainage, no blood, no pus; no SI/HI, frustrated but denies depressive mood, feels safe

## 2019-04-24 NOTE — DISCUSSION/SUMMARY
[FreeTextEntry1] : Maximiliano is 7 weeks postop s/p combined surgery by myself and CRS Dr. Dillon on 3/6/19: robotic converted to open extensive SHIRA / vesicovaginal fistula repair / cystourethroscopy. Leg-bag perez changed last Fri, confirmed draining, no gross hematuria, no fever/chills/flank pain. Repeat imaging performed Mon 4/22/19 showing persistent/unchanged/unimproved vesicovaginal fistula tract superior posterior aspect of bladder into proximal vagina. Patient has symptoms of dribbling per vagina, no blood. She continues to complain of vaginal itch/irritation that improved however she noted it again x 2 days since CT urogram on 4/22/19 - cath is same from 5 days prior and is latex-free. Vaseline barrier and Estrace cream use. \par \par Surgical intervention via abd vs vag route, and with the use of bridging graft material such as Acell discussed. Abdominal/robotic approach discussed - consider cystotomy and transvesical approach to fistulous tract. I offered her her reop, in conjunction with a multidisciplinary team including potentially including urology for ureteral stent placement, and gyn oncology. She understood. I also offered her referral information for Urogyn with extensive fistula experience. She has an appt with a fistula specialist in the city on 5/10/19 at [facility]; I called today to try to move the appt sooner, they will get back to us if possible. She will continue cath drainage until that time, allowing time for inflammation to subside.\par \par She is more OOB now, encouraged to continue, for DVT prophylaxis. \par \par She was offered support as I know she is frustrated with the current situation and breakdown of the fistula repair. She was accompanied by her  today. They requested letter/documentation stating that Maximiliano would benefit family members travel and visit her for support during this recovery period.\par \par Plan:\par [] cath change in 1 month if surg intervention has not yet occurred\par [] topical barrier such as desitin/vaseline to barrier vulva\par [] followup referral for surgical intervention, will continue to try to facilitate her followup\par [] emotional support

## 2019-05-01 ENCOUNTER — APPOINTMENT (OUTPATIENT)
Dept: UROGYNECOLOGY | Facility: CLINIC | Age: 44
End: 2019-05-01
Payer: COMMERCIAL

## 2019-05-01 VITALS
DIASTOLIC BLOOD PRESSURE: 93 MMHG | WEIGHT: 124 LBS | HEIGHT: 60 IN | BODY MASS INDEX: 24.35 KG/M2 | HEART RATE: 75 BPM | TEMPERATURE: 98 F | SYSTOLIC BLOOD PRESSURE: 161 MMHG

## 2019-05-01 DIAGNOSIS — L23.9 ALLERGIC CONTACT DERMATITIS, UNSPECIFIED CAUSE: ICD-10-CM

## 2019-05-01 PROCEDURE — 99213 OFFICE O/P EST LOW 20 MIN: CPT | Mod: 24

## 2019-05-01 PROCEDURE — A4344: CPT

## 2019-05-01 RX ORDER — CIPROFLOXACIN HYDROCHLORIDE 500 MG/1
500 TABLET, FILM COATED ORAL
Qty: 14 | Refills: 0 | Status: ACTIVE | COMMUNITY
Start: 2019-02-19

## 2019-05-01 RX ORDER — LISINOPRIL AND HYDROCHLOROTHIAZIDE TABLETS 20; 12.5 MG/1; MG/1
20-12.5 TABLET ORAL
Qty: 30 | Refills: 0 | Status: ACTIVE | COMMUNITY
Start: 2019-02-19

## 2019-05-01 RX ORDER — LISINOPRIL 20 MG/1
20 TABLET ORAL
Qty: 30 | Refills: 0 | Status: ACTIVE | COMMUNITY
Start: 2019-01-18

## 2019-05-01 RX ORDER — HYDROCORTISONE 25 MG/G
2.5 CREAM TOPICAL 3 TIMES DAILY
Qty: 2 | Refills: 3 | Status: ACTIVE | COMMUNITY
Start: 2019-05-01 | End: 1900-01-01

## 2019-05-01 NOTE — DISCUSSION/SUMMARY
[FreeTextEntry1] : Perez removed, crystalline substance surrounding balloon and in tip of catheter.  Catheter removed was latex, 16 Fr Silicone catheter replaced.  Advised use of Desitin to cover sensitized vulvar skin, Rx 2.5% Hydrocortisone cream to sites where perez clip is removed.  Advised use of a Zyrtec at bedtime and increasing intake of fluids, especially water.   present for encounter.  Both stated their agreement with the new plan.  RTC X 4 weeks or PRN.

## 2019-05-01 NOTE — HISTORY OF PRESENT ILLNESS
[FreeTextEntry1] : This 44 yo woman presents with a history of 3 C/Sections followed by a FLORENTINO (midline incision) 11/2/18 at [facility] for large fibroid uterus.  She had a Vesico-vaginal fistula repair and lysis of adhesions 3/6/19  with persistence of the fistula despite prolonged use of a perez catheter since surgery.  She presents today stating that the catheter did not drain much urine last night into this morning and that she is irritated and itchy.  Also sites where adhesive perez clip is attached to leg are erythematous and itchy.\par \par \par \par

## 2019-05-01 NOTE — PROCEDURE
[Other ___] : [unfilled] [Sarkar Cath Change] : change of an indwelling catheter (Sarkar) [Patient] : the patient [Indwelling Catheter] : an indwelling catheter [Balloon Size ___ cc] : and secured with a [unfilled] cc balloon [None] : there were no complications with the catheter insertion [Other: ___] : [unfilled] [Clear] : clear [Tolerated Well] : the patient tolerated the procedure well [Post procedure instructions and information given] : Post procedure instructions and information were given and reviewed with patient. [No Complications] : no complications [2] : 2

## 2019-05-10 ENCOUNTER — APPOINTMENT (OUTPATIENT)
Dept: UROGYNECOLOGY | Facility: CLINIC | Age: 44
End: 2019-05-10
Payer: COMMERCIAL

## 2019-05-10 PROCEDURE — 99214 OFFICE O/P EST MOD 30 MIN: CPT | Mod: 25

## 2019-05-10 PROCEDURE — 51700 IRRIGATION OF BLADDER: CPT | Mod: 78

## 2019-05-10 NOTE — PROCEDURE
[FreeTextEntry1] : The pt's bladder was filled with methylene blue dyed fluid. A small VVF tract was identified at the cuff.

## 2019-05-10 NOTE — ASSESSMENT
[FreeTextEntry1] : Given that this is a persistent VVF and the 1st repair failed, I would recommend a repeat repair after 3 months from the previous surgery.  Also, will perform a laparotomy with GS present, ureteral catheter placement during the dissection.\par Of note, silver nitrate was applied to the VVF site today in hopes to close the small fistulous tract.  \par Her urine was sent for cx.\par Will plan for surgery in June.

## 2019-05-10 NOTE — PHYSICAL EXAM
[No Acute Distress] : in acute distress [Well Nourished] : ~L well nourished [Well developed] : well developed [Normal Memory] : ~T memory was ~L unimpaired [Good Hygeine] : demonstrates good hygeine [Oriented x3] : oriented to person, place, and time [Anxiety] : patient is not anxious [Normal Mood/Affect] : mood and/or affect are not normal [Normal Lung Sounds] : the lungs were clear to auscultation [Respirations regular] : ~T respiratory rate was regular [Cough] : no cough [Dyspnea] : no dyspnea [Rate & Rhythm Regular] : ~T heart rate and rhythm were normal [No Edema] : ~T edema was not present [Murmurs] : no murmurs were heard [Varicose Veins] : no varicose veins observed [Thyroid Normal] : the thyroid ~T showed no abnormalities [Supple] : ~T the neck demonstrated no ~M decrease in suppleness [Tracheal Deviation] : no tracheal deviation observed [Symmetrical] : the neck was ~L symmetrical [Mass] : no ~M [unfilled] neck mass was observed [Mass (___ Cm)] : no ~M [unfilled] abdominal mass was palpated [Distended] : not distended [Tenderness] : ~T no ~M abdominal tenderness observed [H/Smegaly] : no hepatosplenomegaly [Normal Gait] : gait was normal [Hernia] : no hernia observed [Labia Majora] : were normal [Labia Minora] : were normal [Bartholin's Gland] : both Bartholin's glands were normal  [Pink Rugae] : pink rugae [Normal Appearance] : general appearance was normal [No Bleeding] : there was no active vaginal bleeding [Estrogen Effect] : estrogen effect was observed [Normal] : no abnormalities [Absent] : absent [FreeTextEntry4] : pinpoint VVF at the left corner of the vaginal cuff [Exam Deferred] : was deferred [de-identified] : no sig prolapse

## 2019-05-10 NOTE — HISTORY OF PRESENT ILLNESS
[FreeTextEntry1] : The pt is a 44 y/o with UI via vagina and was diagnosed with VVF, 3 wks after FLORENTINO was performed for fibroid uterus.   She presents today for persistent VVF after undergoing VVF repair 2 months ago via laparotomy after attempted robotic route.  She reports that her leakage is better but still is wearing pads.  She is currently not taking any meds including anti-cholinergic or antibiotics.  She is wearing a leg bag.\par She is wearing 2 PPD.\par She denies MISTI but does report urgency UI\par She denies f/c, n/v.\par Please review the previous notes for detailed hx.

## 2019-06-05 ENCOUNTER — APPOINTMENT (OUTPATIENT)
Dept: UROGYNECOLOGY | Facility: CLINIC | Age: 44
End: 2019-06-05
Payer: COMMERCIAL

## 2019-06-05 PROCEDURE — 99213 OFFICE O/P EST LOW 20 MIN: CPT

## 2019-06-05 NOTE — ASSESSMENT
[FreeTextEntry1] : Sarkar removed and urine sent for cx.\par She will proceed with abdominal VVF repair.\par R/B/A dw the pt and her .  She understands that it may recur.\par Questions were answered and a written consent was obtained

## 2019-06-05 NOTE — HISTORY OF PRESENT ILLNESS
[FreeTextEntry1] : The pt is here for a f/u visit for VVF.\par She reports that she is continuing to leak even with a catheter.  She would like to have the cathter removed as it is irritating her

## 2019-06-10 ENCOUNTER — RESULT REVIEW (OUTPATIENT)
Age: 44
End: 2019-06-10

## 2019-06-10 LAB — BACTERIA UR CULT: ABNORMAL

## 2019-06-10 RX ORDER — SULFAMETHOXAZOLE AND TRIMETHOPRIM 800; 160 MG/1; MG/1
800-160 TABLET ORAL
Qty: 10 | Refills: 0 | Status: ACTIVE | COMMUNITY
Start: 2019-06-10 | End: 1900-01-01

## 2019-06-13 ENCOUNTER — OTHER (OUTPATIENT)
Age: 44
End: 2019-06-13

## 2019-06-14 RX ORDER — SULFAMETHOXAZOLE AND TRIMETHOPRIM 800; 160 MG/1; MG/1
800-160 TABLET ORAL
Qty: 6 | Refills: 0 | Status: ACTIVE | COMMUNITY
Start: 2019-06-14 | End: 1900-01-01

## 2019-06-17 LAB
ANION GAP SERPL CALC-SCNC: 12 MMOL/L
BASOPHILS # BLD AUTO: 0.01 K/UL
BASOPHILS NFR BLD AUTO: 0.1 %
BUN SERPL-MCNC: 8 MG/DL
CALCIUM SERPL-MCNC: 9.1 MG/DL
CHLORIDE SERPL-SCNC: 104 MMOL/L
CO2 SERPL-SCNC: 22 MMOL/L
CREAT SERPL-MCNC: 0.65 MG/DL
EOSINOPHIL # BLD AUTO: 0.07 K/UL
EOSINOPHIL NFR BLD AUTO: 0.9 %
GLUCOSE SERPL-MCNC: 129 MG/DL
HCT VFR BLD CALC: 38.2 %
HGB BLD-MCNC: 12.3 G/DL
IMM GRANULOCYTES NFR BLD AUTO: 0.4 %
LYMPHOCYTES # BLD AUTO: 1.97 K/UL
LYMPHOCYTES NFR BLD AUTO: 26.4 %
MAN DIFF?: NORMAL
MCHC RBC-ENTMCNC: 25.9 PG
MCHC RBC-ENTMCNC: 32.2 GM/DL
MCV RBC AUTO: 80.6 FL
MONOCYTES # BLD AUTO: 0.87 K/UL
MONOCYTES NFR BLD AUTO: 11.7 %
NEUTROPHILS # BLD AUTO: 4.51 K/UL
NEUTROPHILS NFR BLD AUTO: 60.5 %
PLATELET # BLD AUTO: 180 K/UL
POTASSIUM SERPL-SCNC: 4.7 MMOL/L
RBC # BLD: 4.74 M/UL
RBC # FLD: 16.9 %
SODIUM SERPL-SCNC: 138 MMOL/L
WBC # FLD AUTO: 7.46 K/UL

## 2019-06-18 ENCOUNTER — APPOINTMENT (OUTPATIENT)
Dept: UROGYNECOLOGY | Facility: HOSPITAL | Age: 44
End: 2019-06-18
Payer: COMMERCIAL

## 2019-06-18 ENCOUNTER — INPATIENT (INPATIENT)
Facility: HOSPITAL | Age: 44
LOS: 6 days | Discharge: HOME | DRG: 908 | End: 2019-06-25
Attending: OBSTETRICS & GYNECOLOGY | Admitting: OBSTETRICS & GYNECOLOGY
Payer: COMMERCIAL

## 2019-06-18 ENCOUNTER — RESULT REVIEW (OUTPATIENT)
Age: 44
End: 2019-06-18

## 2019-06-18 VITALS
WEIGHT: 132.94 LBS | TEMPERATURE: 97 F | SYSTOLIC BLOOD PRESSURE: 145 MMHG | HEART RATE: 75 BPM | RESPIRATION RATE: 20 BRPM | DIASTOLIC BLOOD PRESSURE: 85 MMHG | HEIGHT: 60 IN | OXYGEN SATURATION: 99 %
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
## 2019-06-18 DIAGNOSIS — Z98.891 HISTORY OF UTERINE SCAR FROM PREVIOUS SURGERY: Chronic | ICD-10-CM

## 2019-06-18 DIAGNOSIS — T81.83XA PERSISTENT POSTPROCEDURAL FISTULA, INITIAL ENCOUNTER: ICD-10-CM

## 2019-06-18 DIAGNOSIS — Z90.710 ACQUIRED ABSENCE OF BOTH CERVIX AND UTERUS: Chronic | ICD-10-CM

## 2019-06-18 LAB
ALBUMIN SERPL ELPH-MCNC: 3.2 G/DL — LOW (ref 3.3–5)
ALP SERPL-CCNC: 52 U/L — SIGNIFICANT CHANGE UP (ref 40–120)
ALT FLD-CCNC: 22 U/L — SIGNIFICANT CHANGE UP (ref 10–45)
ANION GAP SERPL CALC-SCNC: 18 MMOL/L — HIGH (ref 5–17)
APTT BLD: 27.7 SEC — SIGNIFICANT CHANGE UP (ref 27.5–36.3)
AST SERPL-CCNC: 24 U/L — SIGNIFICANT CHANGE UP (ref 10–40)
BILIRUB SERPL-MCNC: 0.3 MG/DL — SIGNIFICANT CHANGE UP (ref 0.2–1.2)
BUN SERPL-MCNC: 10 MG/DL — SIGNIFICANT CHANGE UP (ref 7–23)
CALCIUM SERPL-MCNC: 7.8 MG/DL — LOW (ref 8.4–10.5)
CHLORIDE SERPL-SCNC: 102 MMOL/L — SIGNIFICANT CHANGE UP (ref 96–108)
CO2 SERPL-SCNC: 15 MMOL/L — LOW (ref 22–31)
CREAT SERPL-MCNC: 0.64 MG/DL — SIGNIFICANT CHANGE UP (ref 0.5–1.3)
GLUCOSE SERPL-MCNC: 258 MG/DL — HIGH (ref 70–99)
HCT VFR BLD CALC: 36.1 % — SIGNIFICANT CHANGE UP (ref 34.5–45)
HCT VFR BLD CALC: 39.7 % — SIGNIFICANT CHANGE UP (ref 34.5–45)
HGB BLD-MCNC: 11.4 G/DL — LOW (ref 11.5–15.5)
HGB BLD-MCNC: 12.7 G/DL — SIGNIFICANT CHANGE UP (ref 11.5–15.5)
INR BLD: 1.09 — SIGNIFICANT CHANGE UP (ref 0.88–1.16)
MCHC RBC-ENTMCNC: 26 PG — LOW (ref 27–34)
MCHC RBC-ENTMCNC: 26.5 PG — LOW (ref 27–34)
MCHC RBC-ENTMCNC: 31.6 GM/DL — LOW (ref 32–36)
MCHC RBC-ENTMCNC: 32 GM/DL — SIGNIFICANT CHANGE UP (ref 32–36)
MCV RBC AUTO: 81.2 FL — SIGNIFICANT CHANGE UP (ref 80–100)
MCV RBC AUTO: 83.8 FL — SIGNIFICANT CHANGE UP (ref 80–100)
NRBC # BLD: 0 /100 WBCS — SIGNIFICANT CHANGE UP (ref 0–0)
NRBC # BLD: 0 /100 WBCS — SIGNIFICANT CHANGE UP (ref 0–0)
PLATELET # BLD AUTO: 208 K/UL — SIGNIFICANT CHANGE UP (ref 150–400)
PLATELET # BLD AUTO: 237 K/UL — SIGNIFICANT CHANGE UP (ref 150–400)
POTASSIUM SERPL-MCNC: 4.9 MMOL/L — SIGNIFICANT CHANGE UP (ref 3.5–5.3)
POTASSIUM SERPL-SCNC: 4.9 MMOL/L — SIGNIFICANT CHANGE UP (ref 3.5–5.3)
PROT SERPL-MCNC: 5.7 G/DL — LOW (ref 6–8.3)
PROTHROM AB SERPL-ACNC: 12.3 SEC — SIGNIFICANT CHANGE UP (ref 10–12.9)
RBC # BLD: 4.31 M/UL — SIGNIFICANT CHANGE UP (ref 3.8–5.2)
RBC # BLD: 4.89 M/UL — SIGNIFICANT CHANGE UP (ref 3.8–5.2)
RBC # FLD: 16.7 % — HIGH (ref 10.3–14.5)
RBC # FLD: 16.9 % — HIGH (ref 10.3–14.5)
SODIUM SERPL-SCNC: 135 MMOL/L — SIGNIFICANT CHANGE UP (ref 135–145)
WBC # BLD: 20.27 K/UL — HIGH (ref 3.8–10.5)
WBC # BLD: 21.67 K/UL — HIGH (ref 3.8–10.5)
WBC # FLD AUTO: 20.27 K/UL — HIGH (ref 3.8–10.5)
WBC # FLD AUTO: 21.67 K/UL — HIGH (ref 3.8–10.5)

## 2019-06-18 PROCEDURE — 51900 REPAIR BLADDER/VAGINA LESION: CPT

## 2019-06-18 PROCEDURE — 52332 CYSTOSCOPY AND TREATMENT: CPT

## 2019-06-18 RX ORDER — OXYBUTYNIN CHLORIDE 5 MG
5 TABLET ORAL THREE TIMES A DAY
Refills: 0 | Status: DISCONTINUED | OUTPATIENT
Start: 2019-06-18 | End: 2019-06-25

## 2019-06-18 RX ORDER — DOCUSATE SODIUM 100 MG
100 CAPSULE ORAL
Refills: 0 | Status: DISCONTINUED | OUTPATIENT
Start: 2019-06-18 | End: 2019-06-25

## 2019-06-18 RX ORDER — SODIUM CHLORIDE 9 MG/ML
1000 INJECTION INTRAMUSCULAR; INTRAVENOUS; SUBCUTANEOUS
Refills: 0 | Status: DISCONTINUED | OUTPATIENT
Start: 2019-06-18 | End: 2019-06-20

## 2019-06-18 RX ORDER — CEFAZOLIN SODIUM 1 G
VIAL (EA) INJECTION
Refills: 0 | Status: DISCONTINUED | OUTPATIENT
Start: 2019-06-18 | End: 2019-06-20

## 2019-06-18 RX ORDER — CEFAZOLIN SODIUM 1 G
1000 VIAL (EA) INJECTION EVERY 8 HOURS
Refills: 0 | Status: DISCONTINUED | OUTPATIENT
Start: 2019-06-19 | End: 2019-06-20

## 2019-06-18 RX ORDER — ONDANSETRON 8 MG/1
4 TABLET, FILM COATED ORAL EVERY 6 HOURS
Refills: 0 | Status: DISCONTINUED | OUTPATIENT
Start: 2019-06-18 | End: 2019-06-25

## 2019-06-18 RX ORDER — ACETAMINOPHEN 500 MG
975 TABLET ORAL EVERY 6 HOURS
Refills: 0 | Status: DISCONTINUED | OUTPATIENT
Start: 2019-06-18 | End: 2019-06-25

## 2019-06-18 RX ORDER — BUPIVACAINE 13.3 MG/ML
20 INJECTION, SUSPENSION, LIPOSOMAL INFILTRATION ONCE
Refills: 0 | Status: DISCONTINUED | OUTPATIENT
Start: 2019-06-18 | End: 2019-06-25

## 2019-06-18 RX ORDER — CEFAZOLIN SODIUM 1 G
1000 VIAL (EA) INJECTION ONCE
Refills: 0 | Status: COMPLETED | OUTPATIENT
Start: 2019-06-18 | End: 2019-06-18

## 2019-06-18 RX ORDER — ENOXAPARIN SODIUM 100 MG/ML
40 INJECTION SUBCUTANEOUS DAILY
Refills: 0 | Status: DISCONTINUED | OUTPATIENT
Start: 2019-06-19 | End: 2019-06-25

## 2019-06-18 RX ORDER — ACETAMINOPHEN 500 MG
1000 TABLET ORAL EVERY 6 HOURS
Refills: 0 | Status: COMPLETED | OUTPATIENT
Start: 2019-06-18 | End: 2019-06-18

## 2019-06-18 RX ORDER — HYDROMORPHONE HYDROCHLORIDE 2 MG/ML
30 INJECTION INTRAMUSCULAR; INTRAVENOUS; SUBCUTANEOUS
Refills: 0 | Status: DISCONTINUED | OUTPATIENT
Start: 2019-06-18 | End: 2019-06-20

## 2019-06-18 RX ORDER — SODIUM CHLORIDE 9 MG/ML
500 INJECTION INTRAMUSCULAR; INTRAVENOUS; SUBCUTANEOUS ONCE
Refills: 0 | Status: COMPLETED | OUTPATIENT
Start: 2019-06-18 | End: 2019-06-18

## 2019-06-18 RX ORDER — NALOXONE HYDROCHLORIDE 4 MG/.1ML
0.1 SPRAY NASAL
Refills: 0 | Status: DISCONTINUED | OUTPATIENT
Start: 2019-06-18 | End: 2019-06-25

## 2019-06-18 RX ORDER — PHENAZOPYRIDINE HCL 100 MG
200 TABLET ORAL ONCE
Refills: 0 | Status: COMPLETED | OUTPATIENT
Start: 2019-06-18 | End: 2019-06-18

## 2019-06-18 RX ORDER — METOCLOPRAMIDE HCL 10 MG
10 TABLET ORAL EVERY 4 HOURS
Refills: 0 | Status: DISCONTINUED | OUTPATIENT
Start: 2019-06-18 | End: 2019-06-25

## 2019-06-18 RX ADMIN — Medication 200 MILLIGRAM(S): at 06:49

## 2019-06-18 RX ADMIN — Medication 5 MILLIGRAM(S): at 23:06

## 2019-06-18 RX ADMIN — SODIUM CHLORIDE 500 MILLILITER(S): 9 INJECTION INTRAMUSCULAR; INTRAVENOUS; SUBCUTANEOUS at 19:16

## 2019-06-18 RX ADMIN — Medication 400 MILLIGRAM(S): at 18:18

## 2019-06-18 RX ADMIN — Medication 975 MILLIGRAM(S): at 23:06

## 2019-06-18 RX ADMIN — Medication 975 MILLIGRAM(S): at 23:25

## 2019-06-18 RX ADMIN — Medication 100 MILLIGRAM(S): at 19:14

## 2019-06-18 RX ADMIN — Medication 1000 MILLIGRAM(S): at 19:09

## 2019-06-18 RX ADMIN — HYDROMORPHONE HYDROCHLORIDE 30 MILLILITER(S): 2 INJECTION INTRAMUSCULAR; INTRAVENOUS; SUBCUTANEOUS at 17:49

## 2019-06-18 RX ADMIN — SODIUM CHLORIDE 125 MILLILITER(S): 9 INJECTION INTRAMUSCULAR; INTRAVENOUS; SUBCUTANEOUS at 19:14

## 2019-06-18 NOTE — BRIEF OPERATIVE NOTE - OPERATION/FINDINGS
Exploratory laparotomy, entered with general surgery Dr. Hendrickson. Extensive lysis of dense adhesions. Small bowel adhesion to muscle and posterior cul de sac. Small bowel resection with end to end anastomosis by Dr. Hendrickson.   Cystoscopy with bilateral stent placement.   Vaginal cuff reconstructed.   Fistula tract isolated and resected. Bladder cystotomies 2/2 to poor tissue. Repaired bladder in multiple layers. Back flowed bladder multiple times to evaluate bladder integrity. CRISS drain left in place in cul de sac, in RLQ.   Sarkar in place. B/L ureteral stents removed.

## 2019-06-18 NOTE — PROGRESS NOTE ADULT - ASSESSMENT
43y Female POD# 0 s/p extensive exploratory laparotomy with small bowel resection, SHIRA, vesico-vaginal fistula repair, cystotomy repair. Patient clinically stable.                                1. Neuro/Pain:  Acetaminophen ATC, Dilaudid PCA,   2  CV:   VS per routine  3. Pulm: Encourage ISS  4. GI: NPO, NS at 125 cc / hr   5. :  Sarkar in place - do not remove   6. Heme: PM cbc stable at 11.4; AM CBC to be collected   7. ID: --  8. DVT ppx: SCDs, Lovenox 40mg Qd  9. Dispo: Likely POD#2

## 2019-06-18 NOTE — H&P ADULT - ASSESSMENT
43y  presenting for scheduled vesico-vaginal fistula repair surgery.  - consented for surgery and questions asked  - starting Hgb 11.2 and Cr 0.51

## 2019-06-18 NOTE — BRIEF OPERATIVE NOTE - NSICDXBRIEFPROCEDURE_GEN_ALL_CORE_FT
PROCEDURES:  Small bowel resection with anastomosis 18-Jun-2019 18:23:01  Ana Wong  Repair of vesicovaginal fistula by abdominal approach 18-Jun-2019 18:22:27  Ana Wong

## 2019-06-18 NOTE — H&P ADULT - HISTORY OF PRESENT ILLNESS
43y  presenting for scheduled vesico-vaginal fistula repair surgery. Patient had surgery in 2019 for fistula repair without relief of symptoms. Continues to have leaking of urine per vagina since hysterectomy surgery in 2018.   Denies fever, chills, chest pain, palpitations, SOB, n/v.  +flatus, +BM    OB H/x:  G1- MAB  G2-- C/S at 26wks with  death   G3- - C/S FT  G4-- C/S FT  GYN H/x:  Hx of menorrhagia w/ fibroid uterus, s/p hysterectomy 2018  Hx of vesicovaginal fistula (since hysterectomy), s/p fistula repair surgery 2019- w/o relief of symptoms     MED H/x: denies    SURG H/x:  C/S x3  Hysterectomy 2018  Fistula Repair Surgery 2019    Medications: denies  Allergies: NKDA       Vital Signs Last 24 Hrs  T(C): 35.9 (2019 06:16), Max: 35.9 (2019 06:16)  T(F): 96.7 (2019 06:16), Max: 96.7 (2019 06:16)  HR: 75 (2019 06:16) (75 - 75)  BP: 145/85 (2019 06:16) (145/85 - 145/85)  BP(mean): --  RR: 20 (2019 06:16) (20 - 20)  SpO2: 99% (2019 06:16) (99% - 99%)    Physical Exam:  Gen: NAD, comfortable  GI: soft, nontender, nondistended + BS, no rebound no guarding  Ext: no edema, erythema, tenderness 43y  presenting for scheduled vesico-vaginal fistula repair surgery. Patient had surgery in 2019 for fistula repair without relief of symptoms. Continues to have leaking of urine per vagina since hysterectomy surgery in 2018.   Denies fever, chills, chest pain, palpitations, SOB, n/v.  +flatus, +BM    OB H/x:  G1- MAB  G2-- C/S at 26wks with  death   G3- - C/S FT  G4-- C/S FT  GYN H/x:  Hx of menorrhagia w/ fibroid uterus, s/p hysterectomy 2018  Hx of vesicovaginal fistula (since hysterectomy), s/p fistula repair surgery 2019- w/o relief of symptoms     MED H/x: denies    SURG H/x:  C/S x3  Abdominal Hysterectomy 2018  Fistula Repair Surgery 2019    Medications: denies  Allergies: NKDA       Vital Signs Last 24 Hrs  T(C): 35.9 (2019 06:16), Max: 35.9 (2019 06:16)  T(F): 96.7 (2019 06:16), Max: 96.7 (2019 06:16)  HR: 75 (2019 06:16) (75 - 75)  BP: 145/85 (2019 06:16) (145/85 - 145/85)  BP(mean): --  RR: 20 (2019 06:16) (20 - 20)  SpO2: 99% (2019 06:16) (99% - 99%)    Physical Exam:  Gen: NAD, comfortable  GI: soft, nontender, nondistended + BS, no rebound no guarding  Ext: no edema, erythema, tenderness

## 2019-06-19 LAB
ALBUMIN SERPL ELPH-MCNC: 3 G/DL — LOW (ref 3.3–5)
ALP SERPL-CCNC: 35 U/L — LOW (ref 40–120)
ALT FLD-CCNC: 21 U/L — SIGNIFICANT CHANGE UP (ref 10–45)
ANION GAP SERPL CALC-SCNC: 8 MMOL/L — SIGNIFICANT CHANGE UP (ref 5–17)
AST SERPL-CCNC: 29 U/L — SIGNIFICANT CHANGE UP (ref 10–40)
BASOPHILS # BLD AUTO: 0.01 K/UL — SIGNIFICANT CHANGE UP (ref 0–0.2)
BASOPHILS # BLD AUTO: 0.01 K/UL — SIGNIFICANT CHANGE UP (ref 0–0.2)
BASOPHILS NFR BLD AUTO: 0.1 % — SIGNIFICANT CHANGE UP (ref 0–2)
BASOPHILS NFR BLD AUTO: 0.1 % — SIGNIFICANT CHANGE UP (ref 0–2)
BILIRUB SERPL-MCNC: 0.3 MG/DL — SIGNIFICANT CHANGE UP (ref 0.2–1.2)
BUN SERPL-MCNC: 10 MG/DL — SIGNIFICANT CHANGE UP (ref 7–23)
CALCIUM SERPL-MCNC: 7.6 MG/DL — LOW (ref 8.4–10.5)
CHLORIDE SERPL-SCNC: 108 MMOL/L — SIGNIFICANT CHANGE UP (ref 96–108)
CO2 SERPL-SCNC: 23 MMOL/L — SIGNIFICANT CHANGE UP (ref 22–31)
CREAT SERPL-MCNC: 0.51 MG/DL — SIGNIFICANT CHANGE UP (ref 0.5–1.3)
EOSINOPHIL # BLD AUTO: 0 K/UL — SIGNIFICANT CHANGE UP (ref 0–0.5)
EOSINOPHIL # BLD AUTO: 0 K/UL — SIGNIFICANT CHANGE UP (ref 0–0.5)
EOSINOPHIL NFR BLD AUTO: 0 % — SIGNIFICANT CHANGE UP (ref 0–6)
EOSINOPHIL NFR BLD AUTO: 0 % — SIGNIFICANT CHANGE UP (ref 0–6)
GLUCOSE BLDC GLUCOMTR-MCNC: 179 MG/DL — HIGH (ref 70–99)
GLUCOSE SERPL-MCNC: 207 MG/DL — HIGH (ref 70–99)
HBA1C BLD-MCNC: 6.5 % — HIGH (ref 4–5.6)
HCT VFR BLD CALC: 24.1 % — LOW (ref 34.5–45)
HCT VFR BLD CALC: 25 % — LOW (ref 34.5–45)
HGB BLD-MCNC: 8.1 G/DL — LOW (ref 11.5–15.5)
HGB BLD-MCNC: 8.2 G/DL — LOW (ref 11.5–15.5)
IMM GRANULOCYTES NFR BLD AUTO: 0.5 % — SIGNIFICANT CHANGE UP (ref 0–1.5)
IMM GRANULOCYTES NFR BLD AUTO: 0.6 % — SIGNIFICANT CHANGE UP (ref 0–1.5)
LYMPHOCYTES # BLD AUTO: 0.81 K/UL — LOW (ref 1–3.3)
LYMPHOCYTES # BLD AUTO: 0.9 K/UL — LOW (ref 1–3.3)
LYMPHOCYTES # BLD AUTO: 5.8 % — LOW (ref 13–44)
LYMPHOCYTES # BLD AUTO: 6.4 % — LOW (ref 13–44)
MAGNESIUM SERPL-MCNC: 1.8 MG/DL — SIGNIFICANT CHANGE UP (ref 1.6–2.6)
MCHC RBC-ENTMCNC: 26.2 PG — LOW (ref 27–34)
MCHC RBC-ENTMCNC: 26.5 PG — LOW (ref 27–34)
MCHC RBC-ENTMCNC: 32.8 GM/DL — SIGNIFICANT CHANGE UP (ref 32–36)
MCHC RBC-ENTMCNC: 33.6 GM/DL — SIGNIFICANT CHANGE UP (ref 32–36)
MCV RBC AUTO: 78.8 FL — LOW (ref 80–100)
MCV RBC AUTO: 79.9 FL — LOW (ref 80–100)
MONOCYTES # BLD AUTO: 1.09 K/UL — HIGH (ref 0–0.9)
MONOCYTES # BLD AUTO: 1.19 K/UL — HIGH (ref 0–0.9)
MONOCYTES NFR BLD AUTO: 7.8 % — SIGNIFICANT CHANGE UP (ref 2–14)
MONOCYTES NFR BLD AUTO: 8.4 % — SIGNIFICANT CHANGE UP (ref 2–14)
NEUTROPHILS # BLD AUTO: 11.96 K/UL — HIGH (ref 1.8–7.4)
NEUTROPHILS # BLD AUTO: 11.99 K/UL — HIGH (ref 1.8–7.4)
NEUTROPHILS NFR BLD AUTO: 84.5 % — HIGH (ref 43–77)
NEUTROPHILS NFR BLD AUTO: 85.8 % — HIGH (ref 43–77)
NRBC # BLD: 0 /100 WBCS — SIGNIFICANT CHANGE UP (ref 0–0)
NRBC # BLD: 0 /100 WBCS — SIGNIFICANT CHANGE UP (ref 0–0)
PHOSPHATE SERPL-MCNC: 1.5 MG/DL — LOW (ref 2.5–4.5)
PLATELET # BLD AUTO: 142 K/UL — LOW (ref 150–400)
PLATELET # BLD AUTO: 143 K/UL — LOW (ref 150–400)
POTASSIUM SERPL-MCNC: 3.9 MMOL/L — SIGNIFICANT CHANGE UP (ref 3.5–5.3)
POTASSIUM SERPL-SCNC: 3.9 MMOL/L — SIGNIFICANT CHANGE UP (ref 3.5–5.3)
PROT SERPL-MCNC: 5.2 G/DL — LOW (ref 6–8.3)
RBC # BLD: 3.06 M/UL — LOW (ref 3.8–5.2)
RBC # BLD: 3.13 M/UL — LOW (ref 3.8–5.2)
RBC # FLD: 16.9 % — HIGH (ref 10.3–14.5)
RBC # FLD: 17.1 % — HIGH (ref 10.3–14.5)
SODIUM SERPL-SCNC: 139 MMOL/L — SIGNIFICANT CHANGE UP (ref 135–145)
WBC # BLD: 13.97 K/UL — HIGH (ref 3.8–10.5)
WBC # BLD: 14.14 K/UL — HIGH (ref 3.8–10.5)
WBC # FLD AUTO: 13.97 K/UL — HIGH (ref 3.8–10.5)
WBC # FLD AUTO: 14.14 K/UL — HIGH (ref 3.8–10.5)

## 2019-06-19 PROCEDURE — 99221 1ST HOSP IP/OBS SF/LOW 40: CPT

## 2019-06-19 PROCEDURE — 93010 ELECTROCARDIOGRAM REPORT: CPT

## 2019-06-19 RX ORDER — INSULIN LISPRO 100/ML
VIAL (ML) SUBCUTANEOUS
Refills: 0 | Status: DISCONTINUED | OUTPATIENT
Start: 2019-06-19 | End: 2019-06-25

## 2019-06-19 RX ORDER — DEXTROSE 50 % IN WATER 50 %
12.5 SYRINGE (ML) INTRAVENOUS ONCE
Refills: 0 | Status: DISCONTINUED | OUTPATIENT
Start: 2019-06-19 | End: 2019-06-25

## 2019-06-19 RX ORDER — GLUCAGON INJECTION, SOLUTION 0.5 MG/.1ML
1 INJECTION, SOLUTION SUBCUTANEOUS ONCE
Refills: 0 | Status: DISCONTINUED | OUTPATIENT
Start: 2019-06-19 | End: 2019-06-25

## 2019-06-19 RX ORDER — SODIUM CHLORIDE 9 MG/ML
1000 INJECTION, SOLUTION INTRAVENOUS
Refills: 0 | Status: DISCONTINUED | OUTPATIENT
Start: 2019-06-19 | End: 2019-06-25

## 2019-06-19 RX ORDER — POTASSIUM PHOSPHATE, MONOBASIC POTASSIUM PHOSPHATE, DIBASIC 236; 224 MG/ML; MG/ML
30 INJECTION, SOLUTION INTRAVENOUS ONCE
Refills: 0 | Status: COMPLETED | OUTPATIENT
Start: 2019-06-19 | End: 2019-06-19

## 2019-06-19 RX ORDER — DEXTROSE 50 % IN WATER 50 %
15 SYRINGE (ML) INTRAVENOUS ONCE
Refills: 0 | Status: DISCONTINUED | OUTPATIENT
Start: 2019-06-19 | End: 2019-06-25

## 2019-06-19 RX ORDER — DEXTROSE 50 % IN WATER 50 %
25 SYRINGE (ML) INTRAVENOUS ONCE
Refills: 0 | Status: DISCONTINUED | OUTPATIENT
Start: 2019-06-19 | End: 2019-06-25

## 2019-06-19 RX ADMIN — ENOXAPARIN SODIUM 40 MILLIGRAM(S): 100 INJECTION SUBCUTANEOUS at 13:06

## 2019-06-19 RX ADMIN — Medication 100 MILLIGRAM(S): at 07:12

## 2019-06-19 RX ADMIN — Medication 100 MILLIGRAM(S): at 18:11

## 2019-06-19 RX ADMIN — POTASSIUM PHOSPHATE, MONOBASIC POTASSIUM PHOSPHATE, DIBASIC 83.33 MILLIMOLE(S): 236; 224 INJECTION, SOLUTION INTRAVENOUS at 19:55

## 2019-06-19 RX ADMIN — HYDROMORPHONE HYDROCHLORIDE 30 MILLILITER(S): 2 INJECTION INTRAMUSCULAR; INTRAVENOUS; SUBCUTANEOUS at 21:30

## 2019-06-19 RX ADMIN — Medication 5 MILLIGRAM(S): at 14:27

## 2019-06-19 RX ADMIN — Medication 975 MILLIGRAM(S): at 13:36

## 2019-06-19 RX ADMIN — Medication 100 MILLIGRAM(S): at 21:30

## 2019-06-19 RX ADMIN — Medication 100 MILLIGRAM(S): at 14:27

## 2019-06-19 RX ADMIN — Medication 975 MILLIGRAM(S): at 18:11

## 2019-06-19 RX ADMIN — Medication 5 MILLIGRAM(S): at 21:31

## 2019-06-19 RX ADMIN — Medication 975 MILLIGRAM(S): at 23:15

## 2019-06-19 RX ADMIN — Medication 5 MILLIGRAM(S): at 07:19

## 2019-06-19 RX ADMIN — Medication 975 MILLIGRAM(S): at 13:06

## 2019-06-19 RX ADMIN — Medication 975 MILLIGRAM(S): at 07:12

## 2019-06-19 RX ADMIN — Medication 2: at 23:14

## 2019-06-19 NOTE — PROGRESS NOTE ADULT - ASSESSMENT
43y Female POD#1 s/p extensive exploratory laparotomy with small bowel resection, SHIRA, vesico-vaginal fistula repair, cystotomy repair. Patient clinically stable.                                1. Neuro/Pain:  Acetaminophen ATC, Dilaudid PCA, possibly transition to PO pain medications today , s/p Exparel injection in OR  2  CV:   VS per routine, tachycardiac overnight, asymptomatic, stat EKG ordered at this time   3. Pulm: Encourage ISS  4. GI: clears, heplock, advanced to clears; zofran and reglan PRN nausea , colace BID   5. :  s/p b/l stents, Perez in place - do not remove , oxybutynin 5mg TID  6. Heme: CBC stable but due to tachycardia and anemia will transfuse 1uPRBC  7. ID: continue with ancef 2g q 8hrs given bowel resection and perez to remain in place   8. DVT ppx: SCDs, Lovenox 40mg Qd  9. Dispo: meeting post op milestones and pain control

## 2019-06-19 NOTE — CONSULT NOTE ADULT - SUBJECTIVE AND OBJECTIVE BOX
Cardiology fellow Consult note    HPI:  43y  is admitted to the GYN service vesico-vaginal fistula repair 2/2 hysterectomy in 2018 surgery she is s/p exp lap POD #1. Per primary team the surgery was complicated by 800cc blood loss. However it was otherwise unremarkable. Patient's current complains only include ab pain, around the surgical site. Otherwise is asymptomatic. She denies chest pain, shortness of breath palpitations, pnd, orthopnea, ankle swelling, palpitations, presyncope, syncope. No prior cardiac history or prior cardiac work up.     MED H/x: denies    SURG H/x:  C/S x3  Abdominal Hysterectomy 2018  Fistula Repair Surgery 2019    Medications: denies  Allergies: NKDA       Vital Signs Last 24 Hrs  T(C): 35.9 (2019 06:16), Max: 35.9 (2019 06:16)  T(F): 96.7 (2019 06:16), Max: 96.7 (2019 06:16)  HR: 75 (2019 06:16) (75 - 75)  BP: 145/85 (2019 06:16) (145/85 - 145/85)  BP(mean): --  RR: 20 (2019 06:16) (20 - 20)  SpO2: 99% (2019 06:16) (99% - 99%)        ROS: A 10-point review of systems was otherwise negative.    PAST MEDICAL & SURGICAL HISTORY:  Vesico-vaginal fistula  Hypertension  Bladder incontinence  History of  section  H/O: hysterectomy      SOCIAL HISTORY: No smoking, Drugs, EtOH  FAMILY HISTORY:  No pertinent family history in first degree relatives      ALLERGIES: 	  No Known Allergies    MEDICATIONS:  acetaminophen   Tablet .. 975 milliGRAM(s) Oral every 6 hours  BUpivacaine liposome 1.3% Injectable (no eMAR) 20 milliLiter(s) Local Injection once  ceFAZolin   IVPB 1000 milliGRAM(s) IV Intermittent every 8 hours  ceFAZolin   IVPB      docusate sodium 100 milliGRAM(s) Oral two times a day  enoxaparin Injectable 40 milliGRAM(s) SubCutaneous daily  HYDROmorphone PCA (1 mG/mL) 30 milliLiter(s) PCA Continuous PCA Continuous  metoclopramide Injectable 10 milliGRAM(s) IV Push every 4 hours PRN  naloxone Injectable 0.1 milliGRAM(s) IV Push every 3 minutes PRN  ondansetron Injectable 4 milliGRAM(s) IV Push every 6 hours PRN  oxybutynin 5 milliGRAM(s) Oral three times a day  sodium chloride 0.9%. 1000 milliLiter(s) IV Continuous <Continuous>      PHYSICAL EXAM:  T(C): 36.7 (19 @ 13:32), Max: 37.4 (19 @ 17:25)  HR: 110 (19 @ 13:32) (88 - 120)  BP: 153/97 (19 @ 13:32) (110/69 - 155/86)  RR: 17 (19 @ 13:32) (16 - 25)  SpO2: 100% (19 @ 13:32) (98% - 100%)  Wt(kg): --    GEN: Awake, comfortable. NAD.   HEENT: Mucosa moist. No JVD.   RESP: Bibasilar crackles  CV: RRR, normal s1/s2. No m/r/g.  ABD: Distended, tender to mild palpation diffusely, more prominent at incision site. Central abdominal incision scare w surgical staples and CRISS drain with serosanguinous discharge. BS+  EXT: Warm. No edema, PP 2+  NEURO: AAOx3. No focal deficits.    I&O's Summary    2019 07:  -  2019 07:00  --------------------------------------------------------  IN: 6305 mL / OUT: 2855 mL / NET: 3450 mL    2019 07:01  -  2019 14:06  --------------------------------------------------------  IN: 750 mL / OUT: 1200 mL / NET: -450 mL        	  LABS:	 	                          8.2    14.14 )-----------( 143      ( 2019 12:40 )             25.0         139  |  108  |  10  ----------------------------<  207<H>  3.9   |  23  |  0.51    Ca    7.6<L>      2019 07:22  Phos  1.5       Mg     1.8         TPro  5.2<L>  /  Alb  3.0<L>  /  TBili  0.3  /  DBili  x   /  AST  29  /  ALT  21  /  AlkPhos  35<L>          PT/INR - ( 2019 07:29 )   PT: 12.3 sec;   INR: 1.09          PTT - ( 2019 07:29 )  PTT:27.7 sec  proBNP:   Lipid Profile:   HgA1c: Hemoglobin A1C, Whole Blood: 6.5 % ( @ 07:22)    TELEMETRY: Not on tele    ECG: NSR, isolated TWI in V3, (Non-specific). Otherwise unremarkable.   ECHO: None  STRESS: None  CATH: None Cardiology fellow Consult note    HPI:  43y  is admitted to the GYN service vesico-vaginal fistula repair 2/2 hysterectomy in 2018 surgery she is s/p exp lap POD #1. Per primary team the surgery was complicated by 800cc blood loss. However it was otherwise unremarkable. Patient's current complains only include ab pain, around the surgical site. Otherwise is asymptomatic. She denies chest pain, shortness of breath palpitations, pnd, orthopnea, ankle swelling, palpitations, presyncope, syncope. No prior cardiac history or prior cardiac work up.     MED H/x: denies    SURG H/x:  C/S x3  Abdominal Hysterectomy 2018  Fistula Repair Surgery 2019    Medications: denies  Allergies: NKDA       Vital Signs Last 24 Hrs  T(C): 35.9 (2019 06:16), Max: 35.9 (2019 06:16)  T(F): 96.7 (2019 06:16), Max: 96.7 (2019 06:16)  HR: 75 (2019 06:16) (75 - 75)  BP: 145/85 (2019 06:16) (145/85 - 145/85)  BP(mean): --  RR: 20 (2019 06:16) (20 - 20)  SpO2: 99% (2019 06:16) (99% - 99%)        ROS: A 10-point review of systems was otherwise negative.    PAST MEDICAL & SURGICAL HISTORY:  Vesico-vaginal fistula  Hypertension  Bladder incontinence  History of  section  H/O: hysterectomy      SOCIAL HISTORY: No smoking, Drugs, EtOH  FAMILY HISTORY:  No pertinent family history in first degree relatives      ALLERGIES: 	  No Known Allergies    MEDICATIONS:  acetaminophen   Tablet .. 975 milliGRAM(s) Oral every 6 hours  BUpivacaine liposome 1.3% Injectable (no eMAR) 20 milliLiter(s) Local Injection once  ceFAZolin   IVPB 1000 milliGRAM(s) IV Intermittent every 8 hours  ceFAZolin   IVPB      docusate sodium 100 milliGRAM(s) Oral two times a day  enoxaparin Injectable 40 milliGRAM(s) SubCutaneous daily  HYDROmorphone PCA (1 mG/mL) 30 milliLiter(s) PCA Continuous PCA Continuous  metoclopramide Injectable 10 milliGRAM(s) IV Push every 4 hours PRN  naloxone Injectable 0.1 milliGRAM(s) IV Push every 3 minutes PRN  ondansetron Injectable 4 milliGRAM(s) IV Push every 6 hours PRN  oxybutynin 5 milliGRAM(s) Oral three times a day  sodium chloride 0.9%. 1000 milliLiter(s) IV Continuous <Continuous>      PHYSICAL EXAM:  T(C): 36.7 (19 @ 13:32), Max: 37.4 (19 @ 17:25)  HR: 110 (19 @ 13:32) (88 - 120)  BP: 153/97 (19 @ 13:32) (110/69 - 155/86)  RR: 17 (19 @ 13:32) (16 - 25)  SpO2: 100% (19 @ 13:32) (98% - 100%)  Wt(kg): --    GEN: Awake, comfortable. NAD.   HEENT: Mucosa moist. No JVD.   RESP: Bibasilar crackles  CV: RRR, normal s1/s2. No m/r/g.  ABD: Distended, tender to mild palpation diffusely, more prominent at incision site. Central abdominal incision scare w surgical staples and CRISS drain with serosanguinous discharge. BS+ (Diminished)  EXT: Warm. No edema, PP 2+  NEURO: AAOx3. No focal deficits.    I&O's Summary    2019 07:  -  2019 07:00  --------------------------------------------------------  IN: 6305 mL / OUT: 2855 mL / NET: 3450 mL    2019 07:  -  2019 14:06  --------------------------------------------------------  IN: 750 mL / OUT: 1200 mL / NET: -450 mL        	  LABS:	 	                          8.2    14.14 )-----------( 143      ( 2019 12:40 )             25.0         139  |  108  |  10  ----------------------------<  207<H>  3.9   |  23  |  0.51    Ca    7.6<L>      2019 07:22  Phos  1.5       Mg     1.8         TPro  5.2<L>  /  Alb  3.0<L>  /  TBili  0.3  /  DBili  x   /  AST  29  /  ALT  21  /  AlkPhos  35<L>          PT/INR - ( 2019 07:29 )   PT: 12.3 sec;   INR: 1.09          PTT - ( 2019 07:29 )  PTT:27.7 sec  proBNP:   Lipid Profile:   HgA1c: Hemoglobin A1C, Whole Blood: 6.5 % ( @ 07:22)    TELEMETRY: Not on tele    ECG: NSR, isolated TWI in V3, (Non-specific). Otherwise unremarkable.   ECHO: None  STRESS: None  CATH: None

## 2019-06-19 NOTE — CONSULT NOTE ADULT - ASSESSMENT
43F is s/p exp lap for vesico-vaginal fistula, POD#1 is being consulted for sinus tachycardia and EKG changes      1.	EKG changes: Patient Sinus tachycardia with an isolated TWI in V3. No evidence of RBBB.   ·	This is non-specific finding without clinical significance, given the isolated lead involvement and absence of any cardiac symptoms  ·	No further cardiac work up indicated at this time  ·	Management and further work up of sinus tachycardia per primary team. DDx: Pain, hypovolemia from blood loss, PE          All recs are preliminary pending co-signature of the cardiology attending  Thank you for allowing to parcticipate in the care of this patient    JENSEN Barrow  Cardiology fellow, PGY 5 43F is s/p exp lap for vesico-vaginal fistula, POD#1 is being consulted for sinus tachycardia and EKG changes      1.	EKG changes: Patient Sinus tachycardia with an isolated TWI in V3. No evidence of RBBB.   ·	This is non-specific finding without clinical significance, given the isolated lead involvement and absence of any cardiac symptoms  ·	No further cardiac work up indicated at this time  ·	Management and further work up of sinus tachycardia per primary team. DDx: Post OP SIRS, Pain, hypovolemia from blood loss, evolving SBO, PE      Plan and recommendations discussed with the primary team and the cardiology attending after rounds  Will sign off at this time, please do no hesitate to contact us back if further questions, or concerns arise  Thank you for allowing to participate in the care of this patient    JENSEN Barrow  Cardiology fellow, PGY 5

## 2019-06-19 NOTE — PROGRESS NOTE ADULT - ASSESSMENT
43y Female w/ hx of hysterectomy in 2018 c/b vesico-vaginal fistula s/p extensive exploratory laparotomy with small bowel resection, SHIRA, vesico-vaginal fistula repair, cystotomy repair 6/18    - tachycardic to 120 and hgb 8.1 [11.4], drain output serosanguinous, hgb drop likely dilutional component, but would continue to monitor closely    Recommendations:  - serial abdominal exam, continue to monitor vitals, drain output, urine output  - trend cbc and PRN transfusions per primary team  - if low concern for bleeding, may advance to sips of CLD, slowly advance as tolerated  - will continue to follow  - call for acute changes or if you have questions   - discussed with Dr. Hendrickson

## 2019-06-19 NOTE — CONSULT NOTE ADULT - ATTENDING COMMENTS
See CVD Fellow note written above, for details. I reviewed the fellow's documentation.  I reviewed vitals, labs, medications, cardiac studies and additional imaging 6/19/19.  I agree with the findings and plans as written above with the following additions/amendments:    43F with history of Vesicovaginal fistula, Abdominal adhesions, prior SBO, who is now POD#1 from vesico-vaginal fistula repair surgery. Cardiology is being consulted for review of EKG given c/f RBBB.     Physical Exam notable for: young female laying flat in bed, appears uncomfortable, FLAT neck veins, bounding carotid pulse, regular rhythm, tachycardic rate, no MGR detected, clear lungs, distended but not tense abdomen, diffusely tender to palpation, very sluggish bowel sounds, 2+ peripheral pulses, no pretibial pitting edema, no ankle edema, skin is very warm throughout, A&Ox3  EKG 6/19/19 ST, non specific repolarization abnormality, QTc 451ms  EKG 2/15/19: NSR QTC 446ms, isolated TWI lead III and V3  Telemetry initiated by primary team: no recorded events for review    Recommendations as follows:  Patient with benign EKG with no e/o arrhythmia, heart block, ischemia or other pathology on this readers interpretation. Patient with non pathologic cardiopulmonary exam.   Recommend work up and management of underlying causes of sinus tachycardia including but not limited to pain and potential evolving ileus, hypovolemia/intravascular volume depletion, SIRS, anemia, pulmonary embolus, etc.   Will sign off. Please call back with any questions.   CECI [place].  Cardiology Attending

## 2019-06-20 LAB
ALBUMIN SERPL ELPH-MCNC: 3.2 G/DL — LOW (ref 3.3–5)
ALP SERPL-CCNC: 43 U/L — SIGNIFICANT CHANGE UP (ref 40–120)
ALT FLD-CCNC: 20 U/L — SIGNIFICANT CHANGE UP (ref 10–45)
ANION GAP SERPL CALC-SCNC: 10 MMOL/L — SIGNIFICANT CHANGE UP (ref 5–17)
AST SERPL-CCNC: 27 U/L — SIGNIFICANT CHANGE UP (ref 10–40)
BILIRUB SERPL-MCNC: 0.2 MG/DL — SIGNIFICANT CHANGE UP (ref 0.2–1.2)
BUN SERPL-MCNC: 7 MG/DL — SIGNIFICANT CHANGE UP (ref 7–23)
CALCIUM SERPL-MCNC: 8.1 MG/DL — LOW (ref 8.4–10.5)
CHLORIDE SERPL-SCNC: 106 MMOL/L — SIGNIFICANT CHANGE UP (ref 96–108)
CO2 SERPL-SCNC: 24 MMOL/L — SIGNIFICANT CHANGE UP (ref 22–31)
CREAT FLD-MCNC: 0.43 MG/DL — SIGNIFICANT CHANGE UP
CREAT SERPL-MCNC: 0.44 MG/DL — LOW (ref 0.5–1.3)
GLUCOSE BLDC GLUCOMTR-MCNC: 118 MG/DL — HIGH (ref 70–99)
GLUCOSE BLDC GLUCOMTR-MCNC: 125 MG/DL — HIGH (ref 70–99)
GLUCOSE BLDC GLUCOMTR-MCNC: 130 MG/DL — HIGH (ref 70–99)
GLUCOSE BLDC GLUCOMTR-MCNC: 138 MG/DL — HIGH (ref 70–99)
GLUCOSE SERPL-MCNC: 144 MG/DL — HIGH (ref 70–99)
HCT VFR BLD CALC: 29.3 % — LOW (ref 34.5–45)
HGB BLD-MCNC: 9.7 G/DL — LOW (ref 11.5–15.5)
MAGNESIUM SERPL-MCNC: 2.2 MG/DL — SIGNIFICANT CHANGE UP (ref 1.6–2.6)
MCHC RBC-ENTMCNC: 26.6 PG — LOW (ref 27–34)
MCHC RBC-ENTMCNC: 33.1 GM/DL — SIGNIFICANT CHANGE UP (ref 32–36)
MCV RBC AUTO: 80.5 FL — SIGNIFICANT CHANGE UP (ref 80–100)
NRBC # BLD: 0 /100 WBCS — SIGNIFICANT CHANGE UP (ref 0–0)
PHOSPHATE SERPL-MCNC: 2.1 MG/DL — LOW (ref 2.5–4.5)
PLATELET # BLD AUTO: 144 K/UL — LOW (ref 150–400)
POTASSIUM SERPL-MCNC: 3.8 MMOL/L — SIGNIFICANT CHANGE UP (ref 3.5–5.3)
POTASSIUM SERPL-SCNC: 3.8 MMOL/L — SIGNIFICANT CHANGE UP (ref 3.5–5.3)
PROT SERPL-MCNC: 6 G/DL — SIGNIFICANT CHANGE UP (ref 6–8.3)
RBC # BLD: 3.64 M/UL — LOW (ref 3.8–5.2)
RBC # FLD: 16.9 % — HIGH (ref 10.3–14.5)
SODIUM SERPL-SCNC: 140 MMOL/L — SIGNIFICANT CHANGE UP (ref 135–145)
WBC # BLD: 12.69 K/UL — HIGH (ref 3.8–10.5)
WBC # FLD AUTO: 12.69 K/UL — HIGH (ref 3.8–10.5)

## 2019-06-20 PROCEDURE — 99223 1ST HOSP IP/OBS HIGH 75: CPT | Mod: GC

## 2019-06-20 RX ORDER — SODIUM,POTASSIUM PHOSPHATES 278-250MG
1 POWDER IN PACKET (EA) ORAL
Refills: 0 | Status: COMPLETED | OUTPATIENT
Start: 2019-06-20 | End: 2019-06-20

## 2019-06-20 RX ORDER — OXYCODONE HYDROCHLORIDE 5 MG/1
10 TABLET ORAL EVERY 6 HOURS
Refills: 0 | Status: DISCONTINUED | OUTPATIENT
Start: 2019-06-20 | End: 2019-06-25

## 2019-06-20 RX ORDER — LISINOPRIL 2.5 MG/1
10 TABLET ORAL DAILY
Refills: 0 | Status: DISCONTINUED | OUTPATIENT
Start: 2019-06-20 | End: 2019-06-21

## 2019-06-20 RX ORDER — KETOROLAC TROMETHAMINE 30 MG/ML
30 SYRINGE (ML) INJECTION EVERY 6 HOURS
Refills: 0 | Status: DISCONTINUED | OUTPATIENT
Start: 2019-06-20 | End: 2019-06-21

## 2019-06-20 RX ORDER — OXYCODONE HYDROCHLORIDE 5 MG/1
5 TABLET ORAL EVERY 4 HOURS
Refills: 0 | Status: DISCONTINUED | OUTPATIENT
Start: 2019-06-20 | End: 2019-06-25

## 2019-06-20 RX ORDER — NITROFURANTOIN MACROCRYSTAL 50 MG
100 CAPSULE ORAL EVERY 12 HOURS
Refills: 0 | Status: DISCONTINUED | OUTPATIENT
Start: 2019-06-20 | End: 2019-06-21

## 2019-06-20 RX ADMIN — Medication 975 MILLIGRAM(S): at 13:00

## 2019-06-20 RX ADMIN — Medication 100 MILLIGRAM(S): at 06:11

## 2019-06-20 RX ADMIN — OXYCODONE HYDROCHLORIDE 10 MILLIGRAM(S): 5 TABLET ORAL at 16:07

## 2019-06-20 RX ADMIN — Medication 1 TABLET(S): at 12:26

## 2019-06-20 RX ADMIN — Medication 975 MILLIGRAM(S): at 12:25

## 2019-06-20 RX ADMIN — OXYCODONE HYDROCHLORIDE 10 MILLIGRAM(S): 5 TABLET ORAL at 22:05

## 2019-06-20 RX ADMIN — Medication 975 MILLIGRAM(S): at 06:15

## 2019-06-20 RX ADMIN — Medication 100 MILLIGRAM(S): at 06:13

## 2019-06-20 RX ADMIN — Medication 30 MILLIGRAM(S): at 19:10

## 2019-06-20 RX ADMIN — LISINOPRIL 10 MILLIGRAM(S): 2.5 TABLET ORAL at 19:17

## 2019-06-20 RX ADMIN — ENOXAPARIN SODIUM 40 MILLIGRAM(S): 100 INJECTION SUBCUTANEOUS at 12:17

## 2019-06-20 RX ADMIN — Medication 1 TABLET(S): at 19:17

## 2019-06-20 RX ADMIN — Medication 100 MILLIGRAM(S): at 23:04

## 2019-06-20 RX ADMIN — Medication 975 MILLIGRAM(S): at 20:30

## 2019-06-20 RX ADMIN — Medication 30 MILLIGRAM(S): at 13:00

## 2019-06-20 RX ADMIN — Medication 975 MILLIGRAM(S): at 00:15

## 2019-06-20 RX ADMIN — Medication 5 MILLIGRAM(S): at 22:05

## 2019-06-20 RX ADMIN — Medication 5 MILLIGRAM(S): at 06:13

## 2019-06-20 RX ADMIN — Medication 100 MILLIGRAM(S): at 19:10

## 2019-06-20 RX ADMIN — Medication 30 MILLIGRAM(S): at 20:30

## 2019-06-20 RX ADMIN — Medication 975 MILLIGRAM(S): at 07:15

## 2019-06-20 RX ADMIN — Medication 975 MILLIGRAM(S): at 19:10

## 2019-06-20 RX ADMIN — Medication 5 MILLIGRAM(S): at 14:16

## 2019-06-20 RX ADMIN — Medication 30 MILLIGRAM(S): at 12:17

## 2019-06-20 RX ADMIN — Medication 100 MILLIGRAM(S): at 12:33

## 2019-06-20 RX ADMIN — SODIUM CHLORIDE 125 MILLILITER(S): 9 INJECTION INTRAMUSCULAR; INTRAVENOUS; SUBCUTANEOUS at 01:10

## 2019-06-20 NOTE — PROGRESS NOTE ADULT - ASSESSMENT
43y Female w/ hx of hysterectomy in 2018 c/b vesico-vaginal fistula s/p extensive exploratory laparotomy with small bowel resection, SHIRA, vesico-vaginal fistula repair, cystotomy repair 6/18    - tachycardia improved, hgb stable, good uop, appropriate incisional pain    Recommendations:  - continue sips of CLD, slowly advance as tolerated  - perez to be kept in per primary team  - encouraged to ambulate  - will continue to follow  - call for acute changes or if you have questions   - discussed with Dr. Hendrickson

## 2019-06-20 NOTE — PROGRESS NOTE ADULT - ASSESSMENT
43y Female POD#2 s/p extensive exploratory laparotomy with small bowel resection, SHIRA, vesico-vaginal fistula repair, cystotomy repair. Patient clinically stable.                                1. Neuro/Pain:  Acetaminophen ATC, Dilaudid PCA transition to PO pain medications today - oxycodone PRN, if Hgb stable to add Toradol q6hrs s/p Exparel injection in OR  2  CV:   VS per routine, EKG reviewed by cardiology no signs of Right Bundle Branch block, recommended work up for tachycardia including CTA to rule out PE, CTA ordered, to have CTA performed today, tachycardia improving, no further cardiac work up per cardiology, noted to be hypertensive, patient reported this morning that she was previously started on Lisinopril (Unsure of dose), given HTN In hospital to possible start Lisinopril today   3. Pulm: Encourage ISS  4. GI: tolerating clears, heplock, advance diet per general surgery ; zofran and reglan PRN nausea , colace BID   5. Endo: denies hx of Diabetes, noted to have elevated FS and elevated HgbA1C of 6.5, thus started on insulin sliding scale   5. :  s/p b/l stents, Perez in place - do not remove , oxybutynin 5mg TID  6. Heme: CBC stable but due to tachycardia and anemia transfused 1uPRBC, f/u AM CBC  7. ID: continue with ancef 2g q 8hrs given bowel resection and perez to remain in place   8. DVT ppx: SCDs, Lovenox 40mg Qd  9. Dispo: meeting post op milestones and pain control

## 2019-06-20 NOTE — PHYSICAL THERAPY INITIAL EVALUATION ADULT - PERTINENT HX OF CURRENT PROBLEM, REHAB EVAL
43 year old presenting for scheduled vesico-vaginal fistula repair surgery. Patient had surgery in March 2019 for fistula repair without relief of symptoms. Continues to have leaking of urine per vagina since hysterectomy surgery in 2018.

## 2019-06-20 NOTE — CONSULT NOTE ADULT - SUBJECTIVE AND OBJECTIVE BOX
INTERNAL MEDICINE SERVICE INITIAL CONSULT NOTE    HPI:  43y  presenting for scheduled vesico-vaginal fistula repair surgery. Patient had surgery in 2019 for fistula repair without relief of symptoms. Continues to have leaking of urine per vagina since hysterectomy surgery in .   Denies fever, chills, chest pain, palpitations, SOB, n/v.  +flatus, +BM    ADDITIONAL MEDICINE HPI: Patient reports that she is having some abdominal pain from her procedure, but is otherwise feeling alright. Has had a perez since January, and thinks she will need to have it for another month. She denies having a diagnosis of DM, and is not sure if her PCP had been testing her for it. She denies any fevers, chills, headaches, vision changes, chest pain, SOB, cough, nausea, vomiting, diarrhea, excessive urine in her perez, leg swelling or tingling.     REVIEW OF SYSTEMS:   Otherwise negative except as specified in HPI    PAST MEDICAL HISTORY:   HTN    PAST SURGICAL HISTORY:  OB H/x:  G1- MAB  G2-- C/S at 26wks with  death   G3- - C/S FT  G4-- C/S FT  GYN H/x:  Hx of menorrhagia w/ fibroid uterus, s/p hysterectomy 2018  Hx of vesicovaginal fistula (since hysterectomy), s/p fistula repair surgery 2019- w/o relief of symptoms     FAMILY HISTORY:    SOCIAL HISTORY:  Tobacco use:  EtOH use:  Illicit drug use:    MEDICATIONS:  MEDICATIONS  (STANDING):  acetaminophen   Tablet .. 975 milliGRAM(s) Oral every 6 hours  BUpivacaine liposome 1.3% Injectable (no eMAR) 20 milliLiter(s) Local Injection once  dextrose 5%. 1000 milliLiter(s) (50 mL/Hr) IV Continuous <Continuous>  dextrose 50% Injectable 12.5 Gram(s) IV Push once  dextrose 50% Injectable 25 Gram(s) IV Push once  dextrose 50% Injectable 25 Gram(s) IV Push once  docusate sodium 100 milliGRAM(s) Oral two times a day  enoxaparin Injectable 40 milliGRAM(s) SubCutaneous daily  insulin lispro (HumaLOG) corrective regimen sliding scale   SubCutaneous Before meals and at bedtime  ketorolac   Injectable 30 milliGRAM(s) IV Push every 6 hours  nitrofurantoin monohydrate/macrocrystals (MACROBID) 100 milliGRAM(s) Oral every 12 hours  oxybutynin 5 milliGRAM(s) Oral three times a day  potassium acid phosphate/sodium acid phosphate tablet (K-PHOS No. 2) 1 Tablet(s) Oral three times a day with meals  sodium chloride 0.9%. 1000 milliLiter(s) (125 mL/Hr) IV Continuous <Continuous>    MEDICATIONS  (PRN):  dextrose 40% Gel 15 Gram(s) Oral once PRN Blood Glucose LESS THAN 70 milliGRAM(s)/deciliter  glucagon  Injectable 1 milliGRAM(s) IntraMuscular once PRN Glucose LESS THAN 70 milligrams/deciliter  metoclopramide Injectable 10 milliGRAM(s) IV Push every 4 hours PRN nausea  naloxone Injectable 0.1 milliGRAM(s) IV Push every 3 minutes PRN For ANY of the following changes in patient status:  A. RR LESS THAN 10 breaths per minute, B. Oxygen saturation LESS THAN 90%, C. Sedation score of 6  ondansetron Injectable 4 milliGRAM(s) IV Push every 6 hours PRN Nausea  oxyCODONE    IR 5 milliGRAM(s) Oral every 4 hours PRN Moderate Pain (4 - 6)  oxyCODONE    IR 10 milliGRAM(s) Oral every 6 hours PRN Severe Pain (7 - 10)      ALLERGIES:  Allergies    No Known Allergies    Intolerances        VITAL SIGNS:  Vital Signs Last 24 Hrs  T(C): 36.8 (2019 13:42), Max: 37.4 (2019 17:35)  T(F): 98.3 (2019 13:42), Max: 99.4 (2019 17:35)  HR: 86 (2019 13:42) (86 - 109)  BP: 159/91 (2019 13:42) (146/93 - 168/99)  BP(mean): --  RR: 18 (2019 13:42) (16 - 20)  SpO2: 100% (2019 13:42) (97% - 100%)    19 @ 07:01  -  19 @ 07:00  --------------------------------------------------------  IN:    IV PiggyBack: 598 mL    Oral Fluid: 60 mL    Packed Red Blood Cells: 350 mL    sodium chloride 0.9%.: 2187.5 mL  Total IN: 3195.5 mL    OUT:    Bulb: 240 mL    Indwelling Catheter - Urethral: 4550 mL  Total OUT: 4790 mL    Total NET: -1594.5 mL      19 @ 07:01  -  19 @ 14:13  --------------------------------------------------------  IN:    sodium chloride 0.9%.: 750 mL  Total IN: 750 mL    OUT:    Bulb: 50 mL    Indwelling Catheter - Urethral: 1350 mL  Total OUT: 1400 mL    Total NET: -650 mL          PHYSICAL EXAM:  Constitutional: WDWN resting comfortably in bed; NAD  Head: NC/AT  Eyes: PERRL, EOMI, anicteric sclera  ENT: no nasal discharge; uvula midline, no oropharyngeal erythema or exudates; MMM  Neck: supple; no JVD or thyromegaly  Respiratory: CTA B/L; no W/R/R, no retractions  Cardiac: +S1/S2; RRR; no M/R/G; PMI non-displaced  Gastrointestinal: abdomen soft, NT/ND; no rebound or guarding; +BSx4; midline incision with staples, no surrounding erythema, no drainage, CRISS drain in place with serosanguinous drainage  Back: spine midline, no bony tenderness or step-offs; no CVAT B/L  Extremities: WWP, no clubbing or cyanosis; no peripheral edema  Musculoskeletal: NROM x4; no joint swelling, tenderness or erythema  Dermatologic: skin warm, dry and intact; no rashes, wounds, or scars  Lymphatic: no submandibular or cervical LAD  Neurologic: AAOx3; CNII-XII grossly intact; no focal deficits    LABS:                        9.7    12.69 )-----------( 144      ( 2019 07:53 )             29.3     06-20    140  |  106  |  7   ----------------------------<  144<H>  3.8   |  24  |  0.44<L>    Ca    8.1<L>      2019 07:53  Phos  2.1       Mg     2.2         TPro  6.0  /  Alb  3.2<L>  /  TBili  0.2  /  DBili  x   /  AST  27  /  ALT  20  /  AlkPhos  43              CAPILLARY BLOOD GLUCOSE      POCT Blood Glucose.: 138 mg/dL (2019 11:32)  POCT Blood Glucose.: 125 mg/dL (2019 06:31)  POCT Blood Glucose.: 179 mg/dL (2019 22:49)          RADIOLOGY & ADDITIONAL TESTS:   EKG: sinus tachycardia INTERNAL MEDICINE SERVICE INITIAL CONSULT NOTE    HPI:  43y  presenting for scheduled vesico-vaginal fistula repair surgery. Patient had surgery in 2019 for fistula repair without relief of symptoms. Continues to have leaking of urine per vagina since hysterectomy surgery in .   Denies fever, chills, chest pain, palpitations, SOB, n/v.  +flatus, +BM    ADDITIONAL MEDICINE HPI: Patient reports that she is having some abdominal pain from her procedure, but is otherwise feeling alright. Has had a perez since January, and thinks she will need to have it for another month. She denies having a diagnosis of DM, and is not sure if her PCP had been testing her for it. She denies any fevers, chills, headaches, vision changes, chest pain, SOB, cough, nausea, vomiting, diarrhea, excessive urine in her perez, leg swelling or tingling.     REVIEW OF SYSTEMS:   Otherwise negative except as specified in HPI    PAST MEDICAL HISTORY:   HTN    PAST SURGICAL HISTORY:  OB H/x:  G1- MAB  G2-- C/S at 26wks with  death   G3- - C/S FT  G4-- C/S FT  GYN H/x:  Hx of menorrhagia w/ fibroid uterus, s/p hysterectomy 2018  Hx of vesicovaginal fistula (since hysterectomy), s/p fistula repair surgery 2019- w/o relief of symptoms     FAMILY HISTORY: Noncontributory    SOCIAL HISTORY:  Tobacco use:  EtOH use:  Illicit drug use:    MEDICATIONS:  MEDICATIONS  (STANDING):  acetaminophen   Tablet .. 975 milliGRAM(s) Oral every 6 hours  BUpivacaine liposome 1.3% Injectable (no eMAR) 20 milliLiter(s) Local Injection once  dextrose 5%. 1000 milliLiter(s) (50 mL/Hr) IV Continuous <Continuous>  dextrose 50% Injectable 12.5 Gram(s) IV Push once  dextrose 50% Injectable 25 Gram(s) IV Push once  dextrose 50% Injectable 25 Gram(s) IV Push once  docusate sodium 100 milliGRAM(s) Oral two times a day  enoxaparin Injectable 40 milliGRAM(s) SubCutaneous daily  insulin lispro (HumaLOG) corrective regimen sliding scale   SubCutaneous Before meals and at bedtime  ketorolac   Injectable 30 milliGRAM(s) IV Push every 6 hours  nitrofurantoin monohydrate/macrocrystals (MACROBID) 100 milliGRAM(s) Oral every 12 hours  oxybutynin 5 milliGRAM(s) Oral three times a day  potassium acid phosphate/sodium acid phosphate tablet (K-PHOS No. 2) 1 Tablet(s) Oral three times a day with meals  sodium chloride 0.9%. 1000 milliLiter(s) (125 mL/Hr) IV Continuous <Continuous>    MEDICATIONS  (PRN):  dextrose 40% Gel 15 Gram(s) Oral once PRN Blood Glucose LESS THAN 70 milliGRAM(s)/deciliter  glucagon  Injectable 1 milliGRAM(s) IntraMuscular once PRN Glucose LESS THAN 70 milligrams/deciliter  metoclopramide Injectable 10 milliGRAM(s) IV Push every 4 hours PRN nausea  naloxone Injectable 0.1 milliGRAM(s) IV Push every 3 minutes PRN For ANY of the following changes in patient status:  A. RR LESS THAN 10 breaths per minute, B. Oxygen saturation LESS THAN 90%, C. Sedation score of 6  ondansetron Injectable 4 milliGRAM(s) IV Push every 6 hours PRN Nausea  oxyCODONE    IR 5 milliGRAM(s) Oral every 4 hours PRN Moderate Pain (4 - 6)  oxyCODONE    IR 10 milliGRAM(s) Oral every 6 hours PRN Severe Pain (7 - 10)      ALLERGIES:  Allergies    No Known Allergies    Intolerances        VITAL SIGNS:  Vital Signs Last 24 Hrs  T(C): 36.8 (2019 13:42), Max: 37.4 (2019 17:35)  T(F): 98.3 (2019 13:42), Max: 99.4 (2019 17:35)  HR: 86 (2019 13:42) (86 - 109)  BP: 159/91 (2019 13:42) (146/93 - 168/99)  BP(mean): --  RR: 18 (2019 13:42) (16 - 20)  SpO2: 100% (2019 13:42) (97% - 100%)    19 @ 07:01  -  19 @ 07:00  --------------------------------------------------------  IN:    IV PiggyBack: 598 mL    Oral Fluid: 60 mL    Packed Red Blood Cells: 350 mL    sodium chloride 0.9%.: 2187.5 mL  Total IN: 3195.5 mL    OUT:    Bulb: 240 mL    Indwelling Catheter - Urethral: 4550 mL  Total OUT: 4790 mL    Total NET: -1594.5 mL      19 @ 07:01  -  19 @ 14:13  --------------------------------------------------------  IN:    sodium chloride 0.9%.: 750 mL  Total IN: 750 mL    OUT:    Bulb: 50 mL    Indwelling Catheter - Urethral: 1350 mL  Total OUT: 1400 mL    Total NET: -650 mL          PHYSICAL EXAM:  Constitutional: WDWN resting comfortably in bed; NAD  Head: NC/AT  Eyes: PERRL, EOMI, anicteric sclera  ENT: no nasal discharge; uvula midline, no oropharyngeal erythema or exudates; MMM  Neck: supple; no JVD or thyromegaly  Respiratory: CTA B/L; no W/R/R, no retractions  Cardiac: +S1/S2; RRR; no M/R/G; PMI non-displaced  Gastrointestinal: abdomen soft, NT/ND; no rebound or guarding; +BSx4; midline incision with staples, no surrounding erythema, no drainage, CRISS drain in place with serosanguinous drainage  Back: spine midline, no bony tenderness or step-offs; no CVAT B/L  Extremities: WWP, no clubbing or cyanosis; no peripheral edema  Musculoskeletal: NROM x4; no joint swelling, tenderness or erythema  Dermatologic: skin warm, dry and intact; no rashes, wounds, or scars  Lymphatic: no submandibular or cervical LAD  Neurologic: AAOx3; CNII-XII grossly intact; no focal deficits    LABS:                        9.7    12.69 )-----------( 144      ( 2019 07:53 )             29.3     06-20    140  |  106  |  7   ----------------------------<  144<H>  3.8   |  24  |  0.44<L>    Ca    8.1<L>      2019 07:53  Phos  2.1       Mg     2.2         TPro  6.0  /  Alb  3.2<L>  /  TBili  0.2  /  DBili  x   /  AST  27  /  ALT  20  /  AlkPhos  43              CAPILLARY BLOOD GLUCOSE      POCT Blood Glucose.: 138 mg/dL (2019 11:32)  POCT Blood Glucose.: 125 mg/dL (2019 06:31)  POCT Blood Glucose.: 179 mg/dL (2019 22:49)          RADIOLOGY & ADDITIONAL TESTS:   EKG: sinus tachycardia

## 2019-06-20 NOTE — PHYSICAL THERAPY INITIAL EVALUATION ADULT - GENERAL OBSERVATIONS, REHAB EVAL
Patient received in semi-ngo position, no apparent distress, +telemetry, +hep lock, +sequential pneumatic compression device, +CRISS, +perez.

## 2019-06-20 NOTE — DIETITIAN INITIAL EVALUATION ADULT. - OTHER INFO
44 y/o Female with hx HTN, now s/p extensive exploratory laparotomy with small bowel resection, SHIRA, vesico-vaginal fistula repair, cystotomy repair on 6/18. Patient clinically stable. Pt reports good appetite PTA, follows regular diet, denies food allergies. Stable at UBW of 130lb. Pt reports tolerating CLD. GI: denies N/V, abdomen distended, last BM 6/17, pt reports +flatus. Skin: Elieser score 19 per flow sheet. +pain. Please see full nutritional recs below. RD to monitor and f/u per high risk protocol.

## 2019-06-20 NOTE — DIETITIAN INITIAL EVALUATION ADULT. - ENERGY NEEDS
Ht (6/18): 152.4cm, Wt (6/18): 60.3kg, IBW: 45.5kg +/-10%, %IBW: 132%, BMI: 26   IBW used to calculate energy needs due to pt's current body weight exceeding 120% of IBW. Needs adjusted s/p surgery, overweight status.

## 2019-06-20 NOTE — DIETITIAN INITIAL EVALUATION ADULT. - NS AS NUTRI INTERV ED CONTENT3
Discussed diet advancement with pt, educated pt on low fiber, DM/heart healthy diet and provided handouts

## 2019-06-20 NOTE — DIETITIAN INITIAL EVALUATION ADULT. - NS AS NUTRI INTERV MEALS SNACK
Recommend diet advancement as medically appropriate: CLD, CSTCHO-> full liquid, CSTCHO diet-> low fiber, CSTCHO diet

## 2019-06-20 NOTE — PROGRESS NOTE ADULT - ASSESSMENT
43y Female POD#2 s/p extensive exploratory laparotomy with small bowel resection, SHIRA, vesico-vaginal fistula repair, cystotomy repair. Patient clinically stable.                                1. Neuro/Pain:  Acetaminophen ATC, Dilaudid PCA transition to PO pain medications today - oxycodone PRN, toradol, s/p Exparel injection in OR  2  CV:   VS per routine, tachycardia resolved after transfusion of 1uPRBC; Medicine consulted due to HTN and elevated A1C; recommend lisinopril 10mg qd, and to increase to 20mg qd if persistently hypertensive tomorrow; will send home with metformin 500mg qd   3. Pulm: Encourage ISS  4. GI: tolerating clears, heplock, advance diet per general surgery ; zofran and reglan PRN nausea , colace BID   5. Endo: denies hx of Diabetes, noted to have elevated FS and elevated HgbA1C of 6.5, thus started on insulin sliding scale   5. :  s/p b/l stents, Perez in place - do not remove , oxybutynin 5mg TID  6. Heme: CBC stable but due to tachycardia and anemia transfused 1uPRBC, f/u AM CBC  7. ID: macrobid 100mg BID, perez to remain in place   8. DVT ppx: SCDs, Lovenox 40mg Qd  9. Dispo: meeting post op milestones and pain control

## 2019-06-20 NOTE — CONSULT NOTE ADULT - ASSESSMENT
Ms Siegel is a 43 year old woman admitted to gynecology s/p repair of vesicovaginal fistula by abdominal approach and resection of small bowel POD #2. Medicine consulted for HTN and possibly new onset DM.    1. HTN  - elevated BPs inpatient, patient is on combination HCTZ/lisinopril at home  - Restart lisinopril 10mg daily today, increase to home dose of 20mg as needed    2. DM - A1c of 6.5  - Consistent carbohydrate diet when advanced  - Agree with sliding scale insulin  - Patient should be started on metformin 500mg daily upon discharge     Medicine will continue to follow

## 2019-06-21 LAB
ANION GAP SERPL CALC-SCNC: 13 MMOL/L — SIGNIFICANT CHANGE UP (ref 5–17)
BUN SERPL-MCNC: 11 MG/DL — SIGNIFICANT CHANGE UP (ref 7–23)
CALCIUM SERPL-MCNC: 8.2 MG/DL — LOW (ref 8.4–10.5)
CHLORIDE SERPL-SCNC: 102 MMOL/L — SIGNIFICANT CHANGE UP (ref 96–108)
CO2 SERPL-SCNC: 22 MMOL/L — SIGNIFICANT CHANGE UP (ref 22–31)
CREAT SERPL-MCNC: 0.63 MG/DL — SIGNIFICANT CHANGE UP (ref 0.5–1.3)
GLUCOSE BLDC GLUCOMTR-MCNC: 125 MG/DL — HIGH (ref 70–99)
GLUCOSE BLDC GLUCOMTR-MCNC: 136 MG/DL — HIGH (ref 70–99)
GLUCOSE BLDC GLUCOMTR-MCNC: 137 MG/DL — HIGH (ref 70–99)
GLUCOSE BLDC GLUCOMTR-MCNC: 139 MG/DL — HIGH (ref 70–99)
GLUCOSE SERPL-MCNC: 125 MG/DL — HIGH (ref 70–99)
HCT VFR BLD CALC: 31.1 % — LOW (ref 34.5–45)
HGB BLD-MCNC: 10.3 G/DL — LOW (ref 11.5–15.5)
MAGNESIUM SERPL-MCNC: 2.2 MG/DL — SIGNIFICANT CHANGE UP (ref 1.6–2.6)
MCHC RBC-ENTMCNC: 26.4 PG — LOW (ref 27–34)
MCHC RBC-ENTMCNC: 33.1 GM/DL — SIGNIFICANT CHANGE UP (ref 32–36)
MCV RBC AUTO: 79.7 FL — LOW (ref 80–100)
NRBC # BLD: 0 /100 WBCS — SIGNIFICANT CHANGE UP (ref 0–0)
PHOSPHATE SERPL-MCNC: 3.8 MG/DL — SIGNIFICANT CHANGE UP (ref 2.5–4.5)
PLATELET # BLD AUTO: 172 K/UL — SIGNIFICANT CHANGE UP (ref 150–400)
POTASSIUM SERPL-MCNC: 3.8 MMOL/L — SIGNIFICANT CHANGE UP (ref 3.5–5.3)
POTASSIUM SERPL-SCNC: 3.8 MMOL/L — SIGNIFICANT CHANGE UP (ref 3.5–5.3)
RBC # BLD: 3.9 M/UL — SIGNIFICANT CHANGE UP (ref 3.8–5.2)
RBC # FLD: 16.7 % — HIGH (ref 10.3–14.5)
SODIUM SERPL-SCNC: 137 MMOL/L — SIGNIFICANT CHANGE UP (ref 135–145)
WBC # BLD: 10.75 K/UL — HIGH (ref 3.8–10.5)
WBC # FLD AUTO: 10.75 K/UL — HIGH (ref 3.8–10.5)

## 2019-06-21 PROCEDURE — 99233 SBSQ HOSP IP/OBS HIGH 50: CPT | Mod: GC

## 2019-06-21 RX ORDER — IBUPROFEN 200 MG
600 TABLET ORAL EVERY 6 HOURS
Refills: 0 | Status: DISCONTINUED | OUTPATIENT
Start: 2019-06-21 | End: 2019-06-25

## 2019-06-21 RX ORDER — LISINOPRIL 2.5 MG/1
20 TABLET ORAL DAILY
Refills: 0 | Status: DISCONTINUED | OUTPATIENT
Start: 2019-06-22 | End: 2019-06-25

## 2019-06-21 RX ORDER — SODIUM,POTASSIUM PHOSPHATES 278-250MG
1 POWDER IN PACKET (EA) ORAL
Refills: 0 | Status: COMPLETED | OUTPATIENT
Start: 2019-06-21 | End: 2019-06-22

## 2019-06-21 RX ADMIN — Medication 30 MILLIGRAM(S): at 01:15

## 2019-06-21 RX ADMIN — Medication 975 MILLIGRAM(S): at 06:20

## 2019-06-21 RX ADMIN — Medication 975 MILLIGRAM(S): at 12:00

## 2019-06-21 RX ADMIN — Medication 600 MILLIGRAM(S): at 18:00

## 2019-06-21 RX ADMIN — Medication 975 MILLIGRAM(S): at 05:51

## 2019-06-21 RX ADMIN — Medication 30 MILLIGRAM(S): at 06:20

## 2019-06-21 RX ADMIN — Medication 30 MILLIGRAM(S): at 05:47

## 2019-06-21 RX ADMIN — Medication 975 MILLIGRAM(S): at 01:15

## 2019-06-21 RX ADMIN — Medication 1 TABLET(S): at 12:32

## 2019-06-21 RX ADMIN — Medication 975 MILLIGRAM(S): at 00:29

## 2019-06-21 RX ADMIN — ONDANSETRON 4 MILLIGRAM(S): 8 TABLET, FILM COATED ORAL at 10:00

## 2019-06-21 RX ADMIN — Medication 5 MILLIGRAM(S): at 05:46

## 2019-06-21 RX ADMIN — ONDANSETRON 4 MILLIGRAM(S): 8 TABLET, FILM COATED ORAL at 00:34

## 2019-06-21 RX ADMIN — Medication 5 MILLIGRAM(S): at 12:27

## 2019-06-21 RX ADMIN — Medication 600 MILLIGRAM(S): at 10:00

## 2019-06-21 RX ADMIN — Medication 1 PACKET(S): at 13:17

## 2019-06-21 RX ADMIN — Medication 975 MILLIGRAM(S): at 17:07

## 2019-06-21 RX ADMIN — Medication 100 MILLIGRAM(S): at 05:46

## 2019-06-21 RX ADMIN — Medication 30 MILLIGRAM(S): at 00:29

## 2019-06-21 RX ADMIN — Medication 5 MILLIGRAM(S): at 22:40

## 2019-06-21 RX ADMIN — Medication 975 MILLIGRAM(S): at 11:57

## 2019-06-21 RX ADMIN — OXYCODONE HYDROCHLORIDE 10 MILLIGRAM(S): 5 TABLET ORAL at 22:40

## 2019-06-21 RX ADMIN — Medication 100 MILLIGRAM(S): at 17:07

## 2019-06-21 RX ADMIN — Medication 600 MILLIGRAM(S): at 09:59

## 2019-06-21 RX ADMIN — Medication 975 MILLIGRAM(S): at 23:16

## 2019-06-21 RX ADMIN — Medication 600 MILLIGRAM(S): at 17:07

## 2019-06-21 RX ADMIN — ENOXAPARIN SODIUM 40 MILLIGRAM(S): 100 INJECTION SUBCUTANEOUS at 10:00

## 2019-06-21 RX ADMIN — Medication 975 MILLIGRAM(S): at 18:00

## 2019-06-21 RX ADMIN — OXYCODONE HYDROCHLORIDE 10 MILLIGRAM(S): 5 TABLET ORAL at 23:40

## 2019-06-21 RX ADMIN — Medication 600 MILLIGRAM(S): at 23:16

## 2019-06-21 NOTE — PROGRESS NOTE ADULT - ASSESSMENT
Ms Siegel is a 43 year old woman admitted to gynecology s/p repair of vesicovaginal fistula by abdominal approach and resection of small bowel POD #2. Medicine consulted for HTN and possibly new onset DM.    1. HTN  - elevated BPs inpatient despite lisinopril 10mg   - Increase lisinopril to 20mg daily  - If patient continues to be hypertensive, restart hydrochlorothiazide 12.5mg daily    2. DM - A1c of 6.5  - Consistent carbohydrate diet when advanced  - Agree with sliding scale insulin  - Patient should be started on metformin 500mg daily upon discharge     Medicine will continue to follow Ms Siegel is a 43 year old woman admitted to gynecology s/p repair of vesicovaginal fistula by abdominal approach and resection of small bowel POD #2. Medicine consulted for HTN and possibly new onset DM.    1. HTN  - elevated BPs inpatient despite lisinopril 10mg   - Increase lisinopril to 20mg daily  - If patient continues to be hypertensive >150 systolic tomorrow, restart hydrochlorothiazide 12.5mg daily    2. DM - A1c of 6.5  - Consistent carbohydrate diet when advanced  - Agree with sliding scale insulin  - Patient should be started on metformin 500mg daily upon discharge     Medicine will continue to follow

## 2019-06-21 NOTE — PROGRESS NOTE ADULT - ASSESSMENT
43y Female POD#3 s/p extensive exploratory laparotomy with small bowel resection, SHIRA, vesico-vaginal fistula repair, cystotomy repair. Patient clinically stable.                                1. Neuro/Pain:  Acetaminophen ATC, toradol ATC- transition to motrin ATC, oxycodone PRN,, s/p Exparel injection in OR  2  CV:   VS per routine, tachycardia resolved after transfusion of 1uPRBC; Medicine consulted due to HTN and elevated A1C; recommend lisinopril 10mg qd, and to increase to 20mg qd if persistently hypertensive, appreciate recs  3. Pulm: Encourage ISS  4. GI: tolerating clears, heplock, advance diet per general surgery ; zofran and reglan PRN nausea , colace BID   5. Endo: denies hx of Diabetes, noted to have elevated FS and elevated HgbA1C of 6.5, thus started on insulin sliding scale , per medicine recommendations  - will send home with metformin 500mg qd   5. :  s/p b/l stents, Perez in place - do not remove , oxybutynin 5mg TID  6. Heme: CBC stable but due to tachycardia and anemia transfused 1uPRBC, f/u AM CBC  7. ID: macrobid 100mg BID, perez to remain in place   8. DVT ppx: SCDs, Lovenox 40mg Qd  9. Dispo: meeting post op milestones and pain control

## 2019-06-21 NOTE — PROGRESS NOTE ADULT - ASSESSMENT
43y Female w/ hx of hysterectomy in 2018 c/b vesico-vaginal fistula s/p extensive exploratory laparotomy with small bowel resection, SHIRA, vesico-vaginal fistula repair, cystotomy repair 6/18    - no BM, but + flatus; no nausea / vomiting; afvss, hgb stable, adequate uop; CRISS serosanguinous     Recommendations:  - continue sips of CLD, slowly advance as tolerated  - perez to be kept in per primary team  - encouraged to ambulate  - will continue to follow  - call for acute changes or if you have questions

## 2019-06-21 NOTE — PROGRESS NOTE ADULT - ASSESSMENT
43y Female POD#3 s/p extensive exploratory laparotomy with small bowel resection, SHIRA, vesico-vaginal fistula repair, cystotomy repair. Patient clinically stable.                                1. Neuro/Pain:  Acetaminophen ATC, toradol ATC- transitioned to motrin ATC, oxycodone PRN, s/p Exparel injection in OR  2  CV:   VS per routine, tachycardia resolved after transfusion of 1uPRBC; Medicine consulted due to HTN and elevated A1C; recommend  increasing lisinopril to 20mg qd for persistent HTN, appreciate recs  3. Pulm: Encourage ISS  4. GI: tolerating clears, heplock, advance diet per general surgery ; zofran and reglan PRN nausea , colace BID   5. Endo: denies hx of Diabetes, noted to have elevated FS and elevated HgbA1C of 6.5, thus started on insulin sliding scale , per medicine recommendations  - will send home with metformin 500mg qd   5. :  s/p b/l stents, Perez in place - do not remove , oxybutynin 5mg TID  6. Heme: hgb stable after 1 u prbcs w/ improvement in tachycardia, patient asymptomatic  7. ID: Bactrim DS BID, perez to remain in place   8. DVT ppx: SCDs, Lovenox 40mg Qd  9. Dispo: meeting post op milestones and pain control

## 2019-06-22 LAB
ANION GAP SERPL CALC-SCNC: 13 MMOL/L — SIGNIFICANT CHANGE UP (ref 5–17)
BASOPHILS # BLD AUTO: 0.03 K/UL — SIGNIFICANT CHANGE UP (ref 0–0.2)
BASOPHILS NFR BLD AUTO: 0.3 % — SIGNIFICANT CHANGE UP (ref 0–2)
BUN SERPL-MCNC: 12 MG/DL — SIGNIFICANT CHANGE UP (ref 7–23)
CALCIUM SERPL-MCNC: 8.4 MG/DL — SIGNIFICANT CHANGE UP (ref 8.4–10.5)
CHLORIDE SERPL-SCNC: 100 MMOL/L — SIGNIFICANT CHANGE UP (ref 96–108)
CO2 SERPL-SCNC: 23 MMOL/L — SIGNIFICANT CHANGE UP (ref 22–31)
CREAT SERPL-MCNC: 0.71 MG/DL — SIGNIFICANT CHANGE UP (ref 0.5–1.3)
EOSINOPHIL # BLD AUTO: 0.13 K/UL — SIGNIFICANT CHANGE UP (ref 0–0.5)
EOSINOPHIL NFR BLD AUTO: 1.3 % — SIGNIFICANT CHANGE UP (ref 0–6)
GLUCOSE BLDC GLUCOMTR-MCNC: 139 MG/DL — HIGH (ref 70–99)
GLUCOSE BLDC GLUCOMTR-MCNC: 141 MG/DL — HIGH (ref 70–99)
GLUCOSE BLDC GLUCOMTR-MCNC: 142 MG/DL — HIGH (ref 70–99)
GLUCOSE BLDC GLUCOMTR-MCNC: 145 MG/DL — HIGH (ref 70–99)
GLUCOSE SERPL-MCNC: 129 MG/DL — HIGH (ref 70–99)
HCT VFR BLD CALC: 33 % — LOW (ref 34.5–45)
HGB BLD-MCNC: 10.6 G/DL — LOW (ref 11.5–15.5)
IMM GRANULOCYTES NFR BLD AUTO: 1.5 % — SIGNIFICANT CHANGE UP (ref 0–1.5)
LYMPHOCYTES # BLD AUTO: 1.39 K/UL — SIGNIFICANT CHANGE UP (ref 1–3.3)
LYMPHOCYTES # BLD AUTO: 14.4 % — SIGNIFICANT CHANGE UP (ref 13–44)
MAGNESIUM SERPL-MCNC: 2.4 MG/DL — SIGNIFICANT CHANGE UP (ref 1.6–2.6)
MCHC RBC-ENTMCNC: 26.5 PG — LOW (ref 27–34)
MCHC RBC-ENTMCNC: 32.1 GM/DL — SIGNIFICANT CHANGE UP (ref 32–36)
MCV RBC AUTO: 82.5 FL — SIGNIFICANT CHANGE UP (ref 80–100)
MONOCYTES # BLD AUTO: 0.93 K/UL — HIGH (ref 0–0.9)
MONOCYTES NFR BLD AUTO: 9.6 % — SIGNIFICANT CHANGE UP (ref 2–14)
NEUTROPHILS # BLD AUTO: 7.03 K/UL — SIGNIFICANT CHANGE UP (ref 1.8–7.4)
NEUTROPHILS NFR BLD AUTO: 72.9 % — SIGNIFICANT CHANGE UP (ref 43–77)
NRBC # BLD: 0 /100 WBCS — SIGNIFICANT CHANGE UP (ref 0–0)
PHOSPHATE SERPL-MCNC: 3.9 MG/DL — SIGNIFICANT CHANGE UP (ref 2.5–4.5)
PLATELET # BLD AUTO: 214 K/UL — SIGNIFICANT CHANGE UP (ref 150–400)
POTASSIUM SERPL-MCNC: 3.4 MMOL/L — LOW (ref 3.5–5.3)
POTASSIUM SERPL-SCNC: 3.4 MMOL/L — LOW (ref 3.5–5.3)
RBC # BLD: 4 M/UL — SIGNIFICANT CHANGE UP (ref 3.8–5.2)
RBC # FLD: 16.8 % — HIGH (ref 10.3–14.5)
SODIUM SERPL-SCNC: 136 MMOL/L — SIGNIFICANT CHANGE UP (ref 135–145)
WBC # BLD: 9.65 K/UL — SIGNIFICANT CHANGE UP (ref 3.8–10.5)
WBC # FLD AUTO: 9.65 K/UL — SIGNIFICANT CHANGE UP (ref 3.8–10.5)

## 2019-06-22 PROCEDURE — 99232 SBSQ HOSP IP/OBS MODERATE 35: CPT | Mod: GC

## 2019-06-22 RX ORDER — POTASSIUM CHLORIDE 20 MEQ
10 PACKET (EA) ORAL
Refills: 0 | Status: COMPLETED | OUTPATIENT
Start: 2019-06-22 | End: 2019-06-22

## 2019-06-22 RX ORDER — SENNA PLUS 8.6 MG/1
1 TABLET ORAL DAILY
Refills: 0 | Status: DISCONTINUED | OUTPATIENT
Start: 2019-06-22 | End: 2019-06-25

## 2019-06-22 RX ADMIN — Medication 600 MILLIGRAM(S): at 18:45

## 2019-06-22 RX ADMIN — LISINOPRIL 20 MILLIGRAM(S): 2.5 TABLET ORAL at 06:00

## 2019-06-22 RX ADMIN — OXYCODONE HYDROCHLORIDE 5 MILLIGRAM(S): 5 TABLET ORAL at 21:00

## 2019-06-22 RX ADMIN — Medication 975 MILLIGRAM(S): at 12:31

## 2019-06-22 RX ADMIN — Medication 100 MILLIGRAM(S): at 17:48

## 2019-06-22 RX ADMIN — Medication 1 TABLET(S): at 06:00

## 2019-06-22 RX ADMIN — OXYCODONE HYDROCHLORIDE 5 MILLIGRAM(S): 5 TABLET ORAL at 21:45

## 2019-06-22 RX ADMIN — ENOXAPARIN SODIUM 40 MILLIGRAM(S): 100 INJECTION SUBCUTANEOUS at 11:49

## 2019-06-22 RX ADMIN — Medication 600 MILLIGRAM(S): at 12:31

## 2019-06-22 RX ADMIN — Medication 975 MILLIGRAM(S): at 06:00

## 2019-06-22 RX ADMIN — Medication 5 MILLIGRAM(S): at 12:53

## 2019-06-22 RX ADMIN — Medication 600 MILLIGRAM(S): at 06:00

## 2019-06-22 RX ADMIN — Medication 600 MILLIGRAM(S): at 00:00

## 2019-06-22 RX ADMIN — Medication 975 MILLIGRAM(S): at 06:56

## 2019-06-22 RX ADMIN — Medication 975 MILLIGRAM(S): at 18:45

## 2019-06-22 RX ADMIN — Medication 600 MILLIGRAM(S): at 07:03

## 2019-06-22 RX ADMIN — Medication 1 PACKET(S): at 06:01

## 2019-06-22 RX ADMIN — Medication 975 MILLIGRAM(S): at 17:49

## 2019-06-22 RX ADMIN — Medication 5 MILLIGRAM(S): at 21:00

## 2019-06-22 RX ADMIN — Medication 100 MILLIEQUIVALENT(S): at 12:52

## 2019-06-22 RX ADMIN — Medication 1 TABLET(S): at 17:48

## 2019-06-22 RX ADMIN — Medication 600 MILLIGRAM(S): at 17:48

## 2019-06-22 RX ADMIN — Medication 100 MILLIGRAM(S): at 06:00

## 2019-06-22 RX ADMIN — Medication 975 MILLIGRAM(S): at 11:49

## 2019-06-22 RX ADMIN — SENNA PLUS 1 TABLET(S): 8.6 TABLET ORAL at 21:00

## 2019-06-22 RX ADMIN — Medication 100 MILLIEQUIVALENT(S): at 17:47

## 2019-06-22 RX ADMIN — Medication 975 MILLIGRAM(S): at 00:00

## 2019-06-22 RX ADMIN — Medication 100 MILLIEQUIVALENT(S): at 15:45

## 2019-06-22 RX ADMIN — Medication 5 MILLIGRAM(S): at 06:01

## 2019-06-22 RX ADMIN — Medication 600 MILLIGRAM(S): at 11:49

## 2019-06-22 NOTE — PROGRESS NOTE ADULT - ASSESSMENT
43y Female POD#4 s/p extensive exploratory laparotomy with small bowel resection, SHIRA, vesico-vaginal fistula repair, cystotomy repair. Patient clinically stable.                                1. Neuro/Pain:  Acetaminophen ATC, s/p toradol ATC- transitioned to motrin ATC, oxycodone PRN, s/p Exparel injection in OR  2  CV:   VS per routine, tachycardia resolved after transfusion of 1uPRBC; Medicine consulted due to HTN and elevated A1C; recommend  increasing lisinopril to 20mg qd for persistent HTN, per medicine recs if remains hypertensive >150 to start HCTZ 12.5mg qd, however BP well controlled today on the lisinopril 20mg thus will continue to monitor BP prior to start HCTZ  3. Pulm: Encourage ISS  4. GI: tolerating clears, heplock, advance diet per general surgery, d/w surgery team today- patient will need full liquid diet for 10 days prior to advance diet ; zofran and reglan PRN nausea , colace BID , needs diabetic diet , no BM, surgery recommend senna   5. Endo: denies hx of Diabetes, noted to have elevated FS and elevated HgbA1C of 6.5, thus started on insulin sliding scale , per medicine recommendations  - will send home with metformin 500mg qd   5. :  s/p b/l stents, Perez in place - do not remove , oxybutynin 5mg TID  6. Heme: hgb stable after 1 u prbcs w/ improvement in tachycardia, patient asymptomatic, repeat CBC in AM   7. ID: Bactrim DS BID, perez to remain in place   8. DVT ppx: SCDs, Lovenox 40mg Qd, discussed with patient importance of ambulation, s/p PT consult   9. Dispo: meeting post op milestones and pain control

## 2019-06-22 NOTE — PROGRESS NOTE ADULT - ASSESSMENT
43y Female POD#4 s/p extensive exploratory laparotomy with small bowel resection, SHIRA, vesico-vaginal fistula repair, cystotomy repair. Patient clinically stable.                                1. Neuro/Pain:  Acetaminophen ATC, toradol ATC- transitioned to motrin ATC, oxycodone PRN, s/p Exparel injection in OR  2  CV:   VS per routine, tachycardia resolved after transfusion of 1uPRBC; Medicine consulted due to HTN and elevated A1C; recommend  increasing lisinopril to 20mg qd for persistent HTN, appreciate recs  3. Pulm: Encourage ISS  4. GI: tolerating clears, heplock, advance diet per general surgery ; zofran and reglan PRN nausea , colace BID   5. Endo: denies hx of Diabetes, noted to have elevated FS and elevated HgbA1C of 6.5, thus started on insulin sliding scale , per medicine recommendations  - will send home with metformin 500mg qd   5. :  s/p b/l stents, Perez in place - do not remove , oxybutynin 5mg TID  6. Heme: hgb stable after 1 u prbcs w/ improvement in tachycardia, patient asymptomatic  7. ID: Bactrim DS BID, perez to remain in place   8. DVT ppx: SCDs, Lovenox 40mg Qd  9. Dispo: meeting post op milestones and pain control

## 2019-06-22 NOTE — PROGRESS NOTE ADULT - ASSESSMENT
43y Female w/ hx of hysterectomy in 2018 c/b vesico-vaginal fistula s/p extensive exploratory laparotomy with small bowel resection, SHIRA, vesico-vaginal fistula repair, cystotomy repair 6/18      Recommendations:  - continue CLD, slowly advance as tolerated  - perez to be kept in per primary team  - encouraged to ambulate  - will continue to follow  - call for acute changes or if you have questions   - pt seen with Dr. Ignacio

## 2019-06-22 NOTE — PROGRESS NOTE ADULT - ASSESSMENT
Ms Siegel is a 43 year old woman admitted to gynecology s/p repair of vesicovaginal fistula by abdominal approach and resection of small bowel POD #2. Medicine consulted for HTN and possibly new onset DM.    1. HTN  - elevated BPs inpatient  - Continue lisinopril to 20mg daily  - If patient continues to be hypertensive >150 systolic tomorrow, restart hydrochlorothiazide 12.5mg daily    2. DM - A1c of 6.5  - Consistent carbohydrate diet when advanced  - Agree with sliding scale insulin  - Patient should be started on metformin 500mg daily upon discharge     Medicine will continue to follow

## 2019-06-23 LAB
ALBUMIN SERPL ELPH-MCNC: 3.3 G/DL — SIGNIFICANT CHANGE UP (ref 3.3–5)
ALP SERPL-CCNC: 54 U/L — SIGNIFICANT CHANGE UP (ref 40–120)
ALT FLD-CCNC: 62 U/L — HIGH (ref 10–45)
ANION GAP SERPL CALC-SCNC: 12 MMOL/L — SIGNIFICANT CHANGE UP (ref 5–17)
AST SERPL-CCNC: 59 U/L — HIGH (ref 10–40)
BILIRUB SERPL-MCNC: 0.2 MG/DL — SIGNIFICANT CHANGE UP (ref 0.2–1.2)
BUN SERPL-MCNC: 12 MG/DL — SIGNIFICANT CHANGE UP (ref 7–23)
CALCIUM SERPL-MCNC: 8.3 MG/DL — LOW (ref 8.4–10.5)
CHLORIDE SERPL-SCNC: 105 MMOL/L — SIGNIFICANT CHANGE UP (ref 96–108)
CO2 SERPL-SCNC: 20 MMOL/L — LOW (ref 22–31)
CREAT SERPL-MCNC: 0.5 MG/DL — SIGNIFICANT CHANGE UP (ref 0.5–1.3)
GLUCOSE BLDC GLUCOMTR-MCNC: 107 MG/DL — HIGH (ref 70–99)
GLUCOSE BLDC GLUCOMTR-MCNC: 109 MG/DL — HIGH (ref 70–99)
GLUCOSE BLDC GLUCOMTR-MCNC: 111 MG/DL — HIGH (ref 70–99)
GLUCOSE BLDC GLUCOMTR-MCNC: 99 MG/DL — SIGNIFICANT CHANGE UP (ref 70–99)
GLUCOSE SERPL-MCNC: 115 MG/DL — HIGH (ref 70–99)
HCT VFR BLD CALC: 33.6 % — LOW (ref 34.5–45)
HGB BLD-MCNC: 10.9 G/DL — LOW (ref 11.5–15.5)
MAGNESIUM SERPL-MCNC: 2.2 MG/DL — SIGNIFICANT CHANGE UP (ref 1.6–2.6)
MCHC RBC-ENTMCNC: 27 PG — SIGNIFICANT CHANGE UP (ref 27–34)
MCHC RBC-ENTMCNC: 32.4 GM/DL — SIGNIFICANT CHANGE UP (ref 32–36)
MCV RBC AUTO: 83.4 FL — SIGNIFICANT CHANGE UP (ref 80–100)
NRBC # BLD: 0 /100 WBCS — SIGNIFICANT CHANGE UP (ref 0–0)
PHOSPHATE SERPL-MCNC: 2.9 MG/DL — SIGNIFICANT CHANGE UP (ref 2.5–4.5)
PLATELET # BLD AUTO: 225 K/UL — SIGNIFICANT CHANGE UP (ref 150–400)
POTASSIUM SERPL-MCNC: 4.3 MMOL/L — SIGNIFICANT CHANGE UP (ref 3.5–5.3)
POTASSIUM SERPL-SCNC: 4.3 MMOL/L — SIGNIFICANT CHANGE UP (ref 3.5–5.3)
PROT SERPL-MCNC: 6.5 G/DL — SIGNIFICANT CHANGE UP (ref 6–8.3)
RBC # BLD: 4.03 M/UL — SIGNIFICANT CHANGE UP (ref 3.8–5.2)
RBC # FLD: 16.6 % — HIGH (ref 10.3–14.5)
SODIUM SERPL-SCNC: 137 MMOL/L — SIGNIFICANT CHANGE UP (ref 135–145)
WBC # BLD: 7.93 K/UL — SIGNIFICANT CHANGE UP (ref 3.8–10.5)
WBC # FLD AUTO: 7.93 K/UL — SIGNIFICANT CHANGE UP (ref 3.8–10.5)

## 2019-06-23 PROCEDURE — 99232 SBSQ HOSP IP/OBS MODERATE 35: CPT | Mod: GC

## 2019-06-23 RX ORDER — PANTOPRAZOLE SODIUM 20 MG/1
20 TABLET, DELAYED RELEASE ORAL DAILY
Refills: 0 | Status: DISCONTINUED | OUTPATIENT
Start: 2019-06-23 | End: 2019-06-23

## 2019-06-23 RX ORDER — PANTOPRAZOLE SODIUM 20 MG/1
40 TABLET, DELAYED RELEASE ORAL DAILY
Refills: 0 | Status: DISCONTINUED | OUTPATIENT
Start: 2019-06-23 | End: 2019-06-25

## 2019-06-23 RX ADMIN — Medication 975 MILLIGRAM(S): at 00:03

## 2019-06-23 RX ADMIN — Medication 975 MILLIGRAM(S): at 18:47

## 2019-06-23 RX ADMIN — Medication 975 MILLIGRAM(S): at 17:47

## 2019-06-23 RX ADMIN — Medication 600 MILLIGRAM(S): at 00:00

## 2019-06-23 RX ADMIN — OXYCODONE HYDROCHLORIDE 5 MILLIGRAM(S): 5 TABLET ORAL at 14:32

## 2019-06-23 RX ADMIN — Medication 100 MILLIGRAM(S): at 17:46

## 2019-06-23 RX ADMIN — LISINOPRIL 20 MILLIGRAM(S): 2.5 TABLET ORAL at 05:22

## 2019-06-23 RX ADMIN — Medication 975 MILLIGRAM(S): at 06:16

## 2019-06-23 RX ADMIN — Medication 5 MILLIGRAM(S): at 14:32

## 2019-06-23 RX ADMIN — Medication 600 MILLIGRAM(S): at 05:22

## 2019-06-23 RX ADMIN — Medication 600 MILLIGRAM(S): at 12:07

## 2019-06-23 RX ADMIN — Medication 1 TABLET(S): at 05:21

## 2019-06-23 RX ADMIN — Medication 100 MILLIGRAM(S): at 05:22

## 2019-06-23 RX ADMIN — Medication 5 MILLIGRAM(S): at 21:09

## 2019-06-23 RX ADMIN — SENNA PLUS 1 TABLET(S): 8.6 TABLET ORAL at 11:07

## 2019-06-23 RX ADMIN — ENOXAPARIN SODIUM 40 MILLIGRAM(S): 100 INJECTION SUBCUTANEOUS at 11:07

## 2019-06-23 RX ADMIN — Medication 600 MILLIGRAM(S): at 05:24

## 2019-06-23 RX ADMIN — PANTOPRAZOLE SODIUM 40 MILLIGRAM(S): 20 TABLET, DELAYED RELEASE ORAL at 11:07

## 2019-06-23 RX ADMIN — Medication 975 MILLIGRAM(S): at 06:17

## 2019-06-23 RX ADMIN — Medication 5 MILLIGRAM(S): at 05:21

## 2019-06-23 RX ADMIN — Medication 600 MILLIGRAM(S): at 11:07

## 2019-06-23 RX ADMIN — Medication 975 MILLIGRAM(S): at 00:00

## 2019-06-23 RX ADMIN — Medication 1 TABLET(S): at 17:46

## 2019-06-23 RX ADMIN — OXYCODONE HYDROCHLORIDE 5 MILLIGRAM(S): 5 TABLET ORAL at 15:32

## 2019-06-23 RX ADMIN — Medication 600 MILLIGRAM(S): at 00:03

## 2019-06-23 NOTE — PROGRESS NOTE ADULT - ASSESSMENT
43y Female POD#5 s/p extensive exploratory laparotomy with small bowel resection, SHIRA, vesico-vaginal fistula repair, cystotomy repair. Patient clinically stable with perez in place and tolerating clear liquid diet.                                1. Neuro/Pain:  Acetaminophen ATC, s/p toradol ATC- transitioned to motrin ATC, oxycodone PRN, s/p Exparel injection in OR  2  CV:   VS per routine, tachycardia resolved after transfusion of 1uPRBC; Medicine consulted due to HTN and elevated A1C; recommend  increasing lisinopril to 20mg qd for persistent HTN, per medicine recs if remains hypertensive >150 to start HCTZ 12.5mg qd, however BP well controlled on the lisinopril 20mg thus will continue to monitor BP  3. Pulm: Encourage ISS  4. GI: tolerating clears, heplock, advance diet per general surgery, d/w surgery team - patient will need full liquid diet for 10 days prior to advance diet ; zofran and reglan PRN nausea , colace BID & senna daily, needs diabetic diet , today BM   5. Endo: denies hx of Diabetes, noted to have elevated FS and elevated HgbA1C of 6.5, thus started on insulin sliding scale , per medicine recommendations  - will send home with metformin 500mg qd   5. :  s/p b/l stents, Perez in place - do not remove , oxybutynin 5mg TID  6. Heme: hgb stable after 1 u prbcs w/ improvement in tachycardia, patient asymptomatic, repeat CBC in AM   7. ID: Bactrim DS BID, perez to remain in place   8. DVT ppx: SCDs, Lovenox 40mg Qd, discussed with patient importance of ambulation, s/p PT consult   9. Dispo: meeting post op milestones and pain control

## 2019-06-23 NOTE — PROGRESS NOTE ADULT - ASSESSMENT
43y Female POD#5 s/p extensive exploratory laparotomy with small bowel resection, SHIRA, vesico-vaginal fistula repair, cystotomy repair. Patient clinically stable with perez in place and tolerating clear liquid diet.                                1. Neuro/Pain:  Acetaminophen ATC, s/p toradol ATC- transitioned to motrin ATC, oxycodone PRN, s/p Exparel injection in OR  2  CV:   VS per routine, tachycardia resolved after transfusion of 1uPRBC; Medicine consulted due to HTN and elevated A1C; recommend  increasing lisinopril to 20mg qd for persistent HTN, per medicine recs if remains hypertensive >150 to start HCTZ 12.5mg qd, however BP well controlled on the lisinopril 20mg thus will continue to monitor BP  3. Pulm: Encourage ISS  4. GI: advanced to full liquid diet this morning, herman, - patient will need full liquid diet for 10 days prior to advance diet ; zofran and reglan PRN nausea , colace BID & senna daily, needs diabetic diet , today BM; general surgery following - appreciate recs  5. Endo: denies hx of Diabetes, noted to have elevated FS and elevated HgbA1C of 6.5, thus started on insulin sliding scale , per medicine recommendations  - will send home with metformin 500mg qd   5. :  s/p b/l stents, Perez in place - do not remove , oxybutynin 5mg TID  6. Heme: hgb stable after 1 u prbcs w/ improvement in tachycardia, patient asymptomatic, repeat CBC in AM   7. ID: Bactrim DS BID, perez to remain in place   8. DVT ppx: SCDs, Lovenox 40mg Qd, discussed with patient importance of ambulation, s/p PT consult   9. Dispo: meeting post op milestones and pain control

## 2019-06-23 NOTE — PROGRESS NOTE ADULT - ASSESSMENT
Ms Siegel is a 43 year old woman admitted to gynecology s/p repair of vesicovaginal fistula by abdominal approach and resection of small bowel POD #2. Medicine consulted for HTN and possibly new onset DM.    1. HTN  - elevated BPs inpatient  - Continue lisinopril to 20mg daily    2. DM - A1c of 6.5  - Consistent carbohydrate diet when advanced  - Agree with sliding scale insulin  - Patient should be started on metformin 500mg daily upon discharge     Medicine will continue to follow

## 2019-06-23 NOTE — PROGRESS NOTE ADULT - ASSESSMENT
43y Female w/ hx of hysterectomy in 2018 c/b vesico-vaginal fistula s/p extensive exploratory laparotomy with small bowel resection, SHIRA, vesico-vaginal fistula repair, cystotomy repair 6/18      Recommendations:  - can advance diet to full liquid today.  - encouraged to ambulate  - call for acute changes or if you have questions   -surgery team 2c following

## 2019-06-24 ENCOUNTER — TRANSCRIPTION ENCOUNTER (OUTPATIENT)
Age: 44
End: 2019-06-24

## 2019-06-24 LAB
ANION GAP SERPL CALC-SCNC: 14 MMOL/L — SIGNIFICANT CHANGE UP (ref 5–17)
BASOPHILS # BLD AUTO: 0.02 K/UL — SIGNIFICANT CHANGE UP (ref 0–0.2)
BASOPHILS NFR BLD AUTO: 0.2 % — SIGNIFICANT CHANGE UP (ref 0–2)
BUN SERPL-MCNC: 12 MG/DL — SIGNIFICANT CHANGE UP (ref 7–23)
CALCIUM SERPL-MCNC: 8.5 MG/DL — SIGNIFICANT CHANGE UP (ref 8.4–10.5)
CHLORIDE SERPL-SCNC: 103 MMOL/L — SIGNIFICANT CHANGE UP (ref 96–108)
CO2 SERPL-SCNC: 20 MMOL/L — LOW (ref 22–31)
CREAT SERPL-MCNC: 0.52 MG/DL — SIGNIFICANT CHANGE UP (ref 0.5–1.3)
EOSINOPHIL # BLD AUTO: 0.19 K/UL — SIGNIFICANT CHANGE UP (ref 0–0.5)
EOSINOPHIL NFR BLD AUTO: 2 % — SIGNIFICANT CHANGE UP (ref 0–6)
GLUCOSE BLDC GLUCOMTR-MCNC: 108 MG/DL — HIGH (ref 70–99)
GLUCOSE BLDC GLUCOMTR-MCNC: 114 MG/DL — HIGH (ref 70–99)
GLUCOSE BLDC GLUCOMTR-MCNC: 114 MG/DL — HIGH (ref 70–99)
GLUCOSE BLDC GLUCOMTR-MCNC: 134 MG/DL — HIGH (ref 70–99)
GLUCOSE SERPL-MCNC: 115 MG/DL — HIGH (ref 70–99)
HCT VFR BLD CALC: 32.8 % — LOW (ref 34.5–45)
HGB BLD-MCNC: 10.8 G/DL — LOW (ref 11.5–15.5)
IMM GRANULOCYTES NFR BLD AUTO: 1.6 % — HIGH (ref 0–1.5)
LYMPHOCYTES # BLD AUTO: 1.14 K/UL — SIGNIFICANT CHANGE UP (ref 1–3.3)
LYMPHOCYTES # BLD AUTO: 12.1 % — LOW (ref 13–44)
MAGNESIUM SERPL-MCNC: 2 MG/DL — SIGNIFICANT CHANGE UP (ref 1.6–2.6)
MCHC RBC-ENTMCNC: 26.9 PG — LOW (ref 27–34)
MCHC RBC-ENTMCNC: 32.9 GM/DL — SIGNIFICANT CHANGE UP (ref 32–36)
MCV RBC AUTO: 81.6 FL — SIGNIFICANT CHANGE UP (ref 80–100)
MONOCYTES # BLD AUTO: 0.7 K/UL — SIGNIFICANT CHANGE UP (ref 0–0.9)
MONOCYTES NFR BLD AUTO: 7.4 % — SIGNIFICANT CHANGE UP (ref 2–14)
NEUTROPHILS # BLD AUTO: 7.2 K/UL — SIGNIFICANT CHANGE UP (ref 1.8–7.4)
NEUTROPHILS NFR BLD AUTO: 76.7 % — SIGNIFICANT CHANGE UP (ref 43–77)
NRBC # BLD: 0 /100 WBCS — SIGNIFICANT CHANGE UP (ref 0–0)
PHOSPHATE SERPL-MCNC: 2.5 MG/DL — SIGNIFICANT CHANGE UP (ref 2.5–4.5)
PLATELET # BLD AUTO: 270 K/UL — SIGNIFICANT CHANGE UP (ref 150–400)
POTASSIUM SERPL-MCNC: 4.1 MMOL/L — SIGNIFICANT CHANGE UP (ref 3.5–5.3)
POTASSIUM SERPL-SCNC: 4.1 MMOL/L — SIGNIFICANT CHANGE UP (ref 3.5–5.3)
RBC # BLD: 4.02 M/UL — SIGNIFICANT CHANGE UP (ref 3.8–5.2)
RBC # FLD: 16.3 % — HIGH (ref 10.3–14.5)
SODIUM SERPL-SCNC: 137 MMOL/L — SIGNIFICANT CHANGE UP (ref 135–145)
WBC # BLD: 9.4 K/UL — SIGNIFICANT CHANGE UP (ref 3.8–10.5)
WBC # FLD AUTO: 9.4 K/UL — SIGNIFICANT CHANGE UP (ref 3.8–10.5)

## 2019-06-24 PROCEDURE — 99232 SBSQ HOSP IP/OBS MODERATE 35: CPT | Mod: GC

## 2019-06-24 RX ORDER — IBUPROFEN 200 MG
1 TABLET ORAL
Qty: 0 | Refills: 0 | DISCHARGE
Start: 2019-06-24

## 2019-06-24 RX ORDER — METFORMIN HYDROCHLORIDE 850 MG/1
1 TABLET ORAL
Qty: 30 | Refills: 2
Start: 2019-06-24 | End: 2019-09-21

## 2019-06-24 RX ORDER — SODIUM,POTASSIUM PHOSPHATES 278-250MG
1 POWDER IN PACKET (EA) ORAL ONCE
Refills: 0 | Status: COMPLETED | OUTPATIENT
Start: 2019-06-24 | End: 2019-06-24

## 2019-06-24 RX ORDER — AZTREONAM 2 G
1 VIAL (EA) INJECTION
Qty: 0 | Refills: 0 | DISCHARGE

## 2019-06-24 RX ORDER — BENZOCAINE AND MENTHOL 5; 1 G/100ML; G/100ML
1 LIQUID ORAL THREE TIMES A DAY
Refills: 0 | Status: DISCONTINUED | OUTPATIENT
Start: 2019-06-24 | End: 2019-06-25

## 2019-06-24 RX ORDER — LISINOPRIL 2.5 MG/1
1 TABLET ORAL
Qty: 30 | Refills: 2
Start: 2019-06-24 | End: 2019-09-21

## 2019-06-24 RX ORDER — POLYETHYLENE GLYCOL 3350 17 G/17G
17 POWDER, FOR SOLUTION ORAL
Qty: 238 | Refills: 0
Start: 2019-06-24 | End: 2019-07-07

## 2019-06-24 RX ORDER — AZTREONAM 2 G
1 VIAL (EA) INJECTION
Qty: 60 | Refills: 0
Start: 2019-06-24 | End: 2019-07-23

## 2019-06-24 RX ORDER — TOLTERODINE TARTRATE 1 MG/1
1 TABLET, FILM COATED ORAL
Qty: 30 | Refills: 0
Start: 2019-06-24 | End: 2019-07-23

## 2019-06-24 RX ADMIN — Medication 975 MILLIGRAM(S): at 18:12

## 2019-06-24 RX ADMIN — Medication 5 MILLIGRAM(S): at 12:45

## 2019-06-24 RX ADMIN — Medication 975 MILLIGRAM(S): at 00:29

## 2019-06-24 RX ADMIN — Medication 975 MILLIGRAM(S): at 13:45

## 2019-06-24 RX ADMIN — Medication 600 MILLIGRAM(S): at 12:46

## 2019-06-24 RX ADMIN — Medication 600 MILLIGRAM(S): at 00:30

## 2019-06-24 RX ADMIN — Medication 1 TABLET(S): at 05:14

## 2019-06-24 RX ADMIN — Medication 5 MILLIGRAM(S): at 22:02

## 2019-06-24 RX ADMIN — Medication 600 MILLIGRAM(S): at 06:19

## 2019-06-24 RX ADMIN — PANTOPRAZOLE SODIUM 40 MILLIGRAM(S): 20 TABLET, DELAYED RELEASE ORAL at 12:46

## 2019-06-24 RX ADMIN — SENNA PLUS 1 TABLET(S): 8.6 TABLET ORAL at 12:46

## 2019-06-24 RX ADMIN — Medication 975 MILLIGRAM(S): at 06:19

## 2019-06-24 RX ADMIN — Medication 100 MILLIGRAM(S): at 05:14

## 2019-06-24 RX ADMIN — Medication 1 TABLET(S): at 18:12

## 2019-06-24 RX ADMIN — ENOXAPARIN SODIUM 40 MILLIGRAM(S): 100 INJECTION SUBCUTANEOUS at 12:46

## 2019-06-24 RX ADMIN — Medication 5 MILLIGRAM(S): at 05:14

## 2019-06-24 RX ADMIN — BENZOCAINE AND MENTHOL 1 LOZENGE: 5; 1 LIQUID ORAL at 23:28

## 2019-06-24 RX ADMIN — Medication 600 MILLIGRAM(S): at 18:12

## 2019-06-24 RX ADMIN — Medication 975 MILLIGRAM(S): at 12:46

## 2019-06-24 RX ADMIN — Medication 600 MILLIGRAM(S): at 00:29

## 2019-06-24 RX ADMIN — Medication 1 PACKET(S): at 12:46

## 2019-06-24 RX ADMIN — Medication 975 MILLIGRAM(S): at 00:30

## 2019-06-24 RX ADMIN — Medication 600 MILLIGRAM(S): at 13:45

## 2019-06-24 RX ADMIN — Medication 100 MILLIGRAM(S): at 18:12

## 2019-06-24 RX ADMIN — LISINOPRIL 20 MILLIGRAM(S): 2.5 TABLET ORAL at 05:14

## 2019-06-24 RX ADMIN — Medication 600 MILLIGRAM(S): at 05:14

## 2019-06-24 NOTE — DISCHARGE NOTE PROVIDER - NSDCACTIVITY_GEN_ALL_CORE
No heavy lifting/straining/Walking - Outdoors allowed/Stairs allowed/Do not drive or operate machinery/Walking - Indoors allowed/Showering allowed

## 2019-06-24 NOTE — PROGRESS NOTE ADULT - ASSESSMENT
43y Female POD#6 s/p extensive exploratory laparotomy with small bowel resection, SHIRA, vesico-vaginal fistula repair, cystotomy repair. Patient clinically stable with perez in place and tolerating full liquid diet.                                1. Neuro/Pain:  Acetaminophen ATC, Motrin ATC, oxycodone PRN, s/p Exparel injection in OR  2  CV:   VS per routine, tachycardia resolved after transfusion of 1uPRBC; Medicine consulted due to HTN and elevated A1C; recommend  increasing lisinopril to 20mg qd for persistent HTN.   - Per medicine recs, if patient remains hypertensive >150, plan to start HCTZ 12.5mg qd, however BP well controlled on the lisinopril 20mg thus far.   - Continue to follow BP closely.    3. Pulm: Encourage incentive spirometry   4. GI: advanced to full liquid diet yesterday. Diet per Gen Surg. Zofran and reglan PRN nausea , colace BID & senna daily. 1 episode of soft stool yesterday with no further BMs. General surgery following - appreciate ongoing recommendations.   5. Endo: denies hx of Diabetes, noted to have elevated FS and elevated HgbA1C of 6.5, thus started on insulin sliding scale , per medicine recommendations  - will send home with metformin 500mg qd.   5. :  s/p b/l stents, Perez in place - do not remove , oxybutynin 5mg TID.   6. Heme: hgb stable after 1 u prbcs w/ improvement in tachycardia. AM CBC pending.   7. ID: Bactrim DS BID, perez to remain in place   8. DVT ppx: SCDs, Lovenox 40mg Qd, discussed with patient importance of ambulation, s/p PT consult   9. Dispo: Once patient has met all postoperative milestones and has adequate pain control.

## 2019-06-24 NOTE — DISCHARGE NOTE PROVIDER - NSDCFUADDINST_GEN_ALL_CORE_FT
Avoid inserting anything in the vagina and avoid intercourse for 2 months. Do not drive for 2 weeks. Avoid heavy lifting (<10 lbs) for 2 weeks. Eat normally as tolerated. Take the antibiotic daily until catheter is removed. Do not drive if taking narcotic pain meds. Take Miralax 17 g daily. If you have not had a BM in >2 days, take Milk of Magnesium.

## 2019-06-24 NOTE — DISCHARGE NOTE PROVIDER - CARE PROVIDER_API CALL
Karey Gale (MD)  Female Pelvic [facility]; Obstetrics and Gynecology  [address] [place] [address]  Phone: [phone]  Fax: [phone]  Follow Up Time: 1 week

## 2019-06-24 NOTE — PROGRESS NOTE ADULT - ASSESSMENT
43y Female w/ hx of hysterectomy in 2018 c/b vesico-vaginal fistula s/p extensive exploratory laparotomy with small bowel resection, SHIRA, vesico-vaginal fistula repair, cystotomy repair 6/18    + BM, + flatus; no nausea / vomiting; tolerating PO diet, afvss, hgb stable, adequate uop; CRISS serosanguinous     Recommendations:  - continue FLD, slowly advance as tolerated  - perez to be kept in per primary team  - encouraged to ambulate  - will continue to follow  - call for acute changes or if you have questions

## 2019-06-24 NOTE — PROGRESS NOTE ADULT - ASSESSMENT
Ms Siegel is a 43 year old woman admitted to gynecology s/p repair of vesicovaginal fistula by abdominal approach and resection of small bowel POD #6. Medicine consulted for HTN and possibly new onset DM.    1. HTN  - well controlled this AM  - Continue lisinopril to 20mg daily  - If patient continues to be hypertensive >150 systolic, restart hydrochlorothiazide 12.5mg daily    2. DM - A1c of 6.5  - Consistent carbohydrate diet when advanced  - Agree with sliding scale insulin  - Patient should be started on metformin 500mg daily upon discharge     Medicine will continue to follow Ms Siegel is a 43 year old woman admitted to gynecology s/p repair of vesicovaginal fistula by abdominal approach and resection of small bowel POD #6. Medicine consulted for HTN and possibly new onset DM.    1. HTN  - well controlled this AM  - Continue lisinopril to 20mg daily  - If patient continues to be hypertensive >150 systolic, restart hydrochlorothiazide 12.5mg daily    2. DM - A1c of 6.5  - Consistent carbohydrate diet when advanced  - Agree with sliding scale insulin  - Patient should be started on metformin 500mg daily upon discharge     Patient is clear for discharge from a medical standpoint, please call back with questions

## 2019-06-24 NOTE — PROGRESS NOTE ADULT - ASSESSMENT
43y Female POD#7 s/p extensive exploratory laparotomy with small bowel resection, SHIRA, vesico-vaginal fistula repair, cystotomy repair. Patient clinically stable with perez in place and tolerating regular diet. Patient cleared for discharge yesterday evening but now with chest pain overnight. Awaiting CTA PE protocol to r/o PE, plan to discharge if negative.                 1. Neuro/Pain: Minimal abdominal pain - continue Acetaminophen and Motrin ATC, oxycodone PRN, s/p Exparel injection in OR  2  CV: Single episode of chest pain overnight which is now resolved; chest pain was reproducible on exam; Troponins negative; continue VS per routine.   - Low suspicion for PE as patient is saturating 100% on room air, with stable heart rate though mildly elevated  - Blood pressures are significantly improved from earlier in this admission   - Continue to follow BP closely.    - Medicine team cleared patient for discharge yesterday, agree w/ r/o PE, low suspicion  3. Pulm: Encourage incentive spirometry   4. GI: Regular diet as tolerated; continue senna and colace; General surgery following - appreciate ongoing recommendations.   5. Endo: denies hx of Diabetes, noted to have elevated FS and elevated HgbA1C of 6.5, thus started on insulin sliding scale , per medicine recommendations  - will send home with metformin 500mg qd.   5. :  s/p b/l stents, Perez in place - do not remove , oxybutynin 5mg TID.   6. Heme: hgb stable after 1 u prbcs w/ improvement in tachycardia.  8. DVT ppx: SCDs, Lovenox 40mg Qd, discussed with patient importance of ambulation, s/p PT consult   9. Dispo: Once patient has met all postoperative milestones and has adequate pain control.

## 2019-06-24 NOTE — DISCHARGE NOTE PROVIDER - HOSPITAL COURSE
Patient admitted s/p ex-lap small bowel resection, lysis of adhesions, bilateral ureteral stent placement, cystoscopy, vesicovaginal fistula repair, cystotomy repair, and urethral stitch for a vessicovaginal fistula. Postop course complicated by slow return of bowel function, now passing gas and bowel movements. She was found to be hypertensive and medicine was consulted who recommended patient being on lisinopril 20mg which she will be discharged on. Her fingersticks were noted to be elevated and required insulin sliding scale for control. She will be discharged on metformin 500mg qd per medicine. She has a preez catheter which will remain in place until after discharge. Bactrim sent home with patient for UTI prophylaxis while perez catheter is indwelling. Patient admitted s/p ex-lap small bowel resection, lysis of adhesions, bilateral ureteral stent placement, cystoscopy, vesicovaginal fistula repair, cystotomy repair, and urethral stitch for a vessicovaginal fistula. Postop course complicated by slow return of bowel function, now passing gas and bowel movements. She was found to be hypertensive and medicine was consulted who recommended patient being on lisinopril 20mg which she will be discharged on. Her fingersticks were noted to be elevated and required insulin sliding scale for control. She will be discharged on metformin 500mg qd per medicine. Patient also tachycardic and had an episode of chest pain, normal EKG, CT chest negative for PE. She has a perez catheter which will remain in place until after discharge. Bactrim sent home with patient for UTI prophylaxis while perez catheter is indwelling.

## 2019-06-25 ENCOUNTER — TRANSCRIPTION ENCOUNTER (OUTPATIENT)
Age: 44
End: 2019-06-25

## 2019-06-25 VITALS
TEMPERATURE: 99 F | SYSTOLIC BLOOD PRESSURE: 132 MMHG | OXYGEN SATURATION: 100 % | RESPIRATION RATE: 18 BRPM | DIASTOLIC BLOOD PRESSURE: 81 MMHG | HEART RATE: 110 BPM

## 2019-06-25 LAB
ALBUMIN SERPL ELPH-MCNC: 3.8 G/DL — SIGNIFICANT CHANGE UP (ref 3.3–5)
ALP SERPL-CCNC: 56 U/L — SIGNIFICANT CHANGE UP (ref 40–120)
ALT FLD-CCNC: 67 U/L — HIGH (ref 10–45)
ANION GAP SERPL CALC-SCNC: 14 MMOL/L — SIGNIFICANT CHANGE UP (ref 5–17)
AST SERPL-CCNC: 31 U/L — SIGNIFICANT CHANGE UP (ref 10–40)
BASOPHILS # BLD AUTO: 0 K/UL — SIGNIFICANT CHANGE UP (ref 0–0.2)
BASOPHILS NFR BLD AUTO: 0 % — SIGNIFICANT CHANGE UP (ref 0–2)
BILIRUB SERPL-MCNC: 0.2 MG/DL — SIGNIFICANT CHANGE UP (ref 0.2–1.2)
BUN SERPL-MCNC: 17 MG/DL — SIGNIFICANT CHANGE UP (ref 7–23)
CALCIUM SERPL-MCNC: 9.3 MG/DL — SIGNIFICANT CHANGE UP (ref 8.4–10.5)
CHLORIDE SERPL-SCNC: 100 MMOL/L — SIGNIFICANT CHANGE UP (ref 96–108)
CO2 SERPL-SCNC: 21 MMOL/L — LOW (ref 22–31)
CREAT SERPL-MCNC: 0.55 MG/DL — SIGNIFICANT CHANGE UP (ref 0.5–1.3)
CULTURE RESULTS: NO GROWTH — SIGNIFICANT CHANGE UP
EOSINOPHIL # BLD AUTO: 0.09 K/UL — SIGNIFICANT CHANGE UP (ref 0–0.5)
EOSINOPHIL NFR BLD AUTO: 0.9 % — SIGNIFICANT CHANGE UP (ref 0–6)
GLUCOSE BLDC GLUCOMTR-MCNC: 105 MG/DL — HIGH (ref 70–99)
GLUCOSE BLDC GLUCOMTR-MCNC: 113 MG/DL — HIGH (ref 70–99)
GLUCOSE BLDC GLUCOMTR-MCNC: 145 MG/DL — HIGH (ref 70–99)
GLUCOSE SERPL-MCNC: 148 MG/DL — HIGH (ref 70–99)
HCT VFR BLD CALC: 36 % — SIGNIFICANT CHANGE UP (ref 34.5–45)
HGB BLD-MCNC: 11.8 G/DL — SIGNIFICANT CHANGE UP (ref 11.5–15.5)
LYMPHOCYTES # BLD AUTO: 0.99 K/UL — LOW (ref 1–3.3)
LYMPHOCYTES # BLD AUTO: 9.6 % — LOW (ref 13–44)
MCHC RBC-ENTMCNC: 26.6 PG — LOW (ref 27–34)
MCHC RBC-ENTMCNC: 32.8 GM/DL — SIGNIFICANT CHANGE UP (ref 32–36)
MCV RBC AUTO: 81.1 FL — SIGNIFICANT CHANGE UP (ref 80–100)
MONOCYTES # BLD AUTO: 0.91 K/UL — HIGH (ref 0–0.9)
MONOCYTES NFR BLD AUTO: 8.8 % — SIGNIFICANT CHANGE UP (ref 2–14)
NEUTROPHILS # BLD AUTO: 8.34 K/UL — HIGH (ref 1.8–7.4)
NEUTROPHILS NFR BLD AUTO: 80.7 % — HIGH (ref 43–77)
PLATELET # BLD AUTO: 352 K/UL — SIGNIFICANT CHANGE UP (ref 150–400)
POTASSIUM SERPL-MCNC: 4.4 MMOL/L — SIGNIFICANT CHANGE UP (ref 3.5–5.3)
POTASSIUM SERPL-SCNC: 4.4 MMOL/L — SIGNIFICANT CHANGE UP (ref 3.5–5.3)
PROT SERPL-MCNC: 7.1 G/DL — SIGNIFICANT CHANGE UP (ref 6–8.3)
RBC # BLD: 4.44 M/UL — SIGNIFICANT CHANGE UP (ref 3.8–5.2)
RBC # FLD: 16.1 % — HIGH (ref 10.3–14.5)
SODIUM SERPL-SCNC: 135 MMOL/L — SIGNIFICANT CHANGE UP (ref 135–145)
SPECIMEN SOURCE: SIGNIFICANT CHANGE UP
TROPONIN T SERPL-MCNC: <0.01 NG/ML — SIGNIFICANT CHANGE UP (ref 0–0.01)
WBC # BLD: 10.34 K/UL — SIGNIFICANT CHANGE UP (ref 3.8–10.5)
WBC # FLD AUTO: 10.34 K/UL — SIGNIFICANT CHANGE UP (ref 3.8–10.5)

## 2019-06-25 PROCEDURE — 71275 CT ANGIOGRAPHY CHEST: CPT | Mod: 26

## 2019-06-25 PROCEDURE — 99233 SBSQ HOSP IP/OBS HIGH 50: CPT

## 2019-06-25 RX ORDER — FAMOTIDINE 10 MG/ML
20 INJECTION INTRAVENOUS ONCE
Refills: 0 | Status: COMPLETED | OUTPATIENT
Start: 2019-06-25 | End: 2019-06-25

## 2019-06-25 RX ADMIN — Medication 975 MILLIGRAM(S): at 00:03

## 2019-06-25 RX ADMIN — LISINOPRIL 20 MILLIGRAM(S): 2.5 TABLET ORAL at 05:58

## 2019-06-25 RX ADMIN — Medication 600 MILLIGRAM(S): at 11:59

## 2019-06-25 RX ADMIN — Medication 975 MILLIGRAM(S): at 06:30

## 2019-06-25 RX ADMIN — Medication 600 MILLIGRAM(S): at 12:48

## 2019-06-25 RX ADMIN — Medication 600 MILLIGRAM(S): at 05:58

## 2019-06-25 RX ADMIN — Medication 1 TABLET(S): at 05:58

## 2019-06-25 RX ADMIN — Medication 975 MILLIGRAM(S): at 05:59

## 2019-06-25 RX ADMIN — PANTOPRAZOLE SODIUM 40 MILLIGRAM(S): 20 TABLET, DELAYED RELEASE ORAL at 11:58

## 2019-06-25 RX ADMIN — Medication 600 MILLIGRAM(S): at 06:30

## 2019-06-25 RX ADMIN — ENOXAPARIN SODIUM 40 MILLIGRAM(S): 100 INJECTION SUBCUTANEOUS at 11:58

## 2019-06-25 RX ADMIN — Medication 975 MILLIGRAM(S): at 00:30

## 2019-06-25 RX ADMIN — Medication 1 TABLET(S): at 18:38

## 2019-06-25 RX ADMIN — Medication 600 MILLIGRAM(S): at 18:39

## 2019-06-25 RX ADMIN — Medication 5 MILLIGRAM(S): at 05:57

## 2019-06-25 RX ADMIN — FAMOTIDINE 104 MILLIGRAM(S): 10 INJECTION INTRAVENOUS at 07:44

## 2019-06-25 NOTE — PROGRESS NOTE ADULT - PROVIDER SPECIALTY LIST ADULT
GYN
Internal Medicine
Surgery
GYN
Internal Medicine

## 2019-06-25 NOTE — CHART NOTE - NSCHARTNOTEFT_GEN_A_CORE
Admitting Diagnosis:   Patient is a 43y old  Female who presents with a chief complaint of scheduled surgery (2019 12:49)      PAST MEDICAL & SURGICAL HISTORY:  Vesico-vaginal fistula  Hypertension  Bladder incontinence  History of  section  H/O: hysterectomy      Current Nutrition Order:   Diet, Regular:   Consistent Carbohydrate {Evening Snack} (CSTCHOSN) (19 @ 15:01)  Diet, NPO (19 @ 13:48)      PO Intake: Good (%) [x   ]  Fair (50-75%) [   ] Poor (<25%) [   ]NPO at present due to CT. Generally eats adequate amounts    GI Issues: diarrhea this am    Pain:chest pain noted and being worked up    Skin Integrity:  surgical incision  Labs:       135  |  100  |  17  ----------------------------<  148<H>  4.4   |  21<L>  |  0.55    Ca    9.3      2019 06:52  Phos  2.5       Mg     2.0         TPro  7.1  /  Alb  3.8  /  TBili  0.2  /  DBili  x   /  AST  31  /  ALT  67<H>  /  AlkPhos  56      CAPILLARY BLOOD GLUCOSE      POCT Blood Glucose.: 105 mg/dL (2019 13:04)  POCT Blood Glucose.: 145 mg/dL (2019 06:46)  POCT Blood Glucose.: 134 mg/dL (2019 21:55)  POCT Blood Glucose.: 114 mg/dL (2019 18:04)      Medications:  MEDICATIONS  (STANDING):  acetaminophen   Tablet .. 975 milliGRAM(s) Oral every 6 hours  BUpivacaine liposome 1.3% Injectable (no eMAR) 20 milliLiter(s) Local Injection once  dextrose 5%. 1000 milliLiter(s) (50 mL/Hr) IV Continuous <Continuous>  dextrose 50% Injectable 12.5 Gram(s) IV Push once  dextrose 50% Injectable 25 Gram(s) IV Push once  dextrose 50% Injectable 25 Gram(s) IV Push once  docusate sodium 100 milliGRAM(s) Oral two times a day  enoxaparin Injectable 40 milliGRAM(s) SubCutaneous daily  ibuprofen  Tablet. 600 milliGRAM(s) Oral every 6 hours  insulin lispro (HumaLOG) corrective regimen sliding scale   SubCutaneous Before meals and at bedtime  lisinopril 20 milliGRAM(s) Oral daily  oxybutynin 5 milliGRAM(s) Oral three times a day  pantoprazole  Injectable 40 milliGRAM(s) IV Push daily  senna 1 Tablet(s) Oral daily  trimethoprim  160 mG/sulfamethoxazole 800 mG 1 Tablet(s) Oral every 12 hours    MEDICATIONS  (PRN):  benzocaine 15 mG/menthol 3.6 mG Lozenge 1 Lozenge Oral three times a day PRN Sore Throat  dextrose 40% Gel 15 Gram(s) Oral once PRN Blood Glucose LESS THAN 70 milliGRAM(s)/deciliter  glucagon  Injectable 1 milliGRAM(s) IntraMuscular once PRN Glucose LESS THAN 70 milligrams/deciliter  metoclopramide Injectable 10 milliGRAM(s) IV Push every 4 hours PRN nausea  naloxone Injectable 0.1 milliGRAM(s) IV Push every 3 minutes PRN For ANY of the following changes in patient status:  A. RR LESS THAN 10 breaths per minute, B. Oxygen saturation LESS THAN 90%, C. Sedation score of 6  ondansetron Injectable 4 milliGRAM(s) IV Push every 6 hours PRN Nausea  oxyCODONE    IR 5 milliGRAM(s) Oral every 4 hours PRN Moderate Pain (4 - 6)  oxyCODONE    IR 10 milliGRAM(s) Oral every 6 hours PRN Severe Pain (7 - 10)      Weight:60.3kg  Daily   no updated weights  Daily no updated weights    Weight Change: no updated weights    Nutrition Focused Physical Exam: Completed [   ]  Not Pertinent [x   ]  Muscle Wasting- Temporal [   ]  Clavicle/Pectoral [   ]  Shoulder/Deltoid [   ]  Scapula [   ]  Interosseous [   ]  Quadriceps [   ]  Gastrocnemius [   ]  Fat Wasting- Orbital [   ]  Buccal [   ]  Triceps [   ]  Rib [   ]  Suspect [PCM] 2/2 to physical assessment, [poor intake], and [wt loss]; please see malnutrition chart note.    Estimated energy needs: IBW used due to above 120% of IBW.IBW:45.5kg w86-55wbyf:1365-1592kcal and 1.2-1.4gmprotein(surgical demands):54.6-63.7gmprotein and 25-30cc:1137-1365cc fluids  Subjective: 42 y/o female POD #& s/p exp.Lap and small bowel SHIRA/vesico-vaginal fistula repair with perez.Noted complaints of chest pain.CT ordered.No N/V.Noted diarrheal episode this am.Eating adequate amounts.Plan for D/C today     Previous Nutrition Diagnosis:Inadequate energy intake r/t inability to meet >50% of EER on clears AEB:clears not meet EER requirements    Active [   ]  Resolved [x   ]    If resolved, new PES: Increased nutrient needs r/t increased demand for nutrients and protein AEB: surgical demands    Goal:Meet 80% of needs consistently    Recommendations:1.Updated weights    Education: completed [facility] diet educated    Risk Level: High [   ] Moderate [ x  ] Low [   ]

## 2019-06-25 NOTE — PROGRESS NOTE ADULT - ASSESSMENT
Ms Siegel is a 43 year old woman admitted to gynecology s/p repair of vesicovaginal fistula by abdominal approach and resection of small bowel POD #6. Medicine consulted for HTN and possibly new onset DM.    1. Chest and epigastric pain - negative troponin. Low suspicion for PE given lack of leg swelling, however, patient is high risk. Suspect that this is GERD or MSK in nature.   - Will follow up CTPE ordered by primary team    2. HTN  - well controlled this AM  - Continue lisinopril to 20mg daily  - If patient continues to be hypertensive >150 systolic, restart hydrochlorothiazide 12.5mg daily    3. DM - A1c of 6.5  - Consistent carbohydrate diet when advanced  - Agree with sliding scale insulin  - Patient should be started on metformin 500mg daily upon discharge     Patient is clear for discharge from a medical standpoint pending CTPE

## 2019-06-25 NOTE — PROGRESS NOTE ADULT - ASSESSMENT
43y Female w/ hx of hysterectomy in 2018 c/b vesico-vaginal fistula s/p extensive exploratory laparotomy with small bowel resection, SHIRA, vesico-vaginal fistula repair, cystotomy repair 6/18    Complaining of chest pain and intermittent sob when ambulating, satting well currently, no lower extremity edema, breath sounds clear, heart sounds normal  + BM, + flatus; no nausea / vomiting; tolerating PO diet, afvss, hgb stable, adequate uop; CRISS serosanguinous     Recommendations:  - consider LE duplex for evaluation of DVT/PE, continue to monitor vitals and saturations while ambulating  - continue diet as patient is tolerating and having ROBF  - perez to be kept in per primary team  - encouraged to ambulate  - will continue to follow  - call for acute changes or if you have questions

## 2019-06-25 NOTE — PROGRESS NOTE ADULT - ATTENDING COMMENTS
Patient was seen and examined by me at bedside. I agree with resident's note, subjective, objective physical exam, assessment and plan with following modifications/additions.     On examining the patient, there's low suspicion for PE, pain is reproducible and mainly in upper abdomen, looks comfortable; however, patient is tachycardic and potentially a high risk.  Primary team will get CT-PE to r/o PE.   Pending CT results, patient is medically optimized for discharge from medicine perspective.
Appears more distended today and not passing gas, no BM since monday. Would perform abd xray.
Medicine will sign off at this time. Please call back with questions

## 2019-06-25 NOTE — DISCHARGE NOTE NURSING/CASE MANAGEMENT/SOCIAL WORK - NSDCDPATPORTLINK_GEN_ALL_CORE
You can access the [facility] Patient Portal, offered by [facility], by registering with the following website: [email or web address]

## 2019-06-25 NOTE — PROGRESS NOTE ADULT - REASON FOR ADMISSION
scheduled surgery

## 2019-06-25 NOTE — CHART NOTE - NSCHARTNOTEFT_GEN_A_CORE
Patient evaluated at bedside due to complaint of chest pain.  She reports feeling pain substernal at center of chest, worse with movement and reports feeling some SOB at onset of this pain.  She reports pain began 2-3 hours ago.  On exam, patient appears to be in no acute distress and is sitting up comfortably in bed.  Her lungs are CTAB and heart rate is tachycardic but regular rhythm.  The pain she describes is reproducible on palpation of the sternum at the site where she reports having pain.  Will obtain EKG stat at this time, however, given reproducibility of pain on palpation of sternum, this chest pain is likely secondary to musculoskeletal etiology due to patient's prolonged stay in hospital bed and decreased movement postoperatively. Will continue to monitor closely and pursue further workup if it does not resolve with pain medication or if EKG is abnormal.

## 2019-06-25 NOTE — PROGRESS NOTE ADULT - ASSESSMENT
43y Female POD#7 s/p extensive exploratory laparotomy with small bowel resection, SHIRA, vesico-vaginal fistula repair, cystotomy repair. Patient clinically stable with perez in place and tolerating regular diet. Patient cleared for discharge yesterday evening but now with chest pain overnight.                     1. Neuro/Pain: Minimal abdominal pain - continue Acetaminophen and Motrin ATC, oxycodone PRN, s/p Exparel injection in OR  2  CV: Single episode of chest pain overnight which is now resolved; chest pain was reproducible on exam; Troponins sent with AM labs; continue VS per routine.   - Low suspicion for PE as patient is saturating 100% on room air, with stable heart rate though mildly elevated  - Blood pressures are significantly improved from earlier in this admission   - Continue to follow BP closely.    - Medicine team cleared patient for discharge yesterday, will reconsult now secondary to new onset chest pain.   3. Pulm: Encourage incentive spirometry   4. GI: Regular diet as tolerated; continue senna and colace; General surgery following - appreciate ongoing recommendations.   5. Endo: denies hx of Diabetes, noted to have elevated FS and elevated HgbA1C of 6.5, thus started on insulin sliding scale , per medicine recommendations  - will send home with metformin 500mg qd.   5. :  s/p b/l stents, Perez in place - do not remove , oxybutynin 5mg TID.   6. Heme: hgb stable after 1 u prbcs w/ improvement in tachycardia. AM CBC pending.   7. ID: Bactrim DS BID, perez to remain in place   8. DVT ppx: SCDs, Lovenox 40mg Qd, discussed with patient importance of ambulation, s/p PT consult   9. Dispo: Once patient has met all postoperative milestones and has adequate pain control.

## 2019-06-25 NOTE — PROGRESS NOTE ADULT - SUBJECTIVE AND OBJECTIVE BOX
Patient is a 43y old  Female who presents with a chief complaint of scheduled surgery (23 Jun 2019 09:48)      INTERVAL HPI/OVERNIGHT EVENTS: No acute events O/N. Passing gas, abd pain better than yesterday      Review of Systems: 12 point review of systems otherwise negative      MEDICATIONS  (STANDING):  acetaminophen   Tablet .. 975 milliGRAM(s) Oral every 6 hours  BUpivacaine liposome 1.3% Injectable (no eMAR) 20 milliLiter(s) Local Injection once  dextrose 5%. 1000 milliLiter(s) (50 mL/Hr) IV Continuous <Continuous>  dextrose 50% Injectable 12.5 Gram(s) IV Push once  dextrose 50% Injectable 25 Gram(s) IV Push once  dextrose 50% Injectable 25 Gram(s) IV Push once  docusate sodium 100 milliGRAM(s) Oral two times a day  enoxaparin Injectable 40 milliGRAM(s) SubCutaneous daily  ibuprofen  Tablet. 600 milliGRAM(s) Oral every 6 hours  insulin lispro (HumaLOG) corrective regimen sliding scale   SubCutaneous Before meals and at bedtime  lisinopril 20 milliGRAM(s) Oral daily  oxybutynin 5 milliGRAM(s) Oral three times a day  pantoprazole  Injectable 40 milliGRAM(s) IV Push daily  senna 1 Tablet(s) Oral daily  trimethoprim  160 mG/sulfamethoxazole 800 mG 1 Tablet(s) Oral every 12 hours    MEDICATIONS  (PRN):  dextrose 40% Gel 15 Gram(s) Oral once PRN Blood Glucose LESS THAN 70 milliGRAM(s)/deciliter  glucagon  Injectable 1 milliGRAM(s) IntraMuscular once PRN Glucose LESS THAN 70 milligrams/deciliter  metoclopramide Injectable 10 milliGRAM(s) IV Push every 4 hours PRN nausea  naloxone Injectable 0.1 milliGRAM(s) IV Push every 3 minutes PRN For ANY of the following changes in patient status:  A. RR LESS THAN 10 breaths per minute, B. Oxygen saturation LESS THAN 90%, C. Sedation score of 6  ondansetron Injectable 4 milliGRAM(s) IV Push every 6 hours PRN Nausea  oxyCODONE    IR 5 milliGRAM(s) Oral every 4 hours PRN Moderate Pain (4 - 6)  oxyCODONE    IR 10 milliGRAM(s) Oral every 6 hours PRN Severe Pain (7 - 10)      Allergies    No Known Allergies    Intolerances          Vital Signs Last 24 Hrs  T(C): 36.9 (23 Jun 2019 05:47), Max: 37.3 (22 Jun 2019 20:19)  T(F): 98.4 (23 Jun 2019 05:47), Max: 99.1 (22 Jun 2019 20:19)  HR: 103 (23 Jun 2019 09:00) (67 - 103)  BP: 138/91 (23 Jun 2019 09:00) (123/82 - 138/91)  BP(mean): --  RR: 16 (23 Jun 2019 09:00) (15 - 17)  SpO2: 100% (23 Jun 2019 09:00) (98% - 100%)  CAPILLARY BLOOD GLUCOSE      POCT Blood Glucose.: 109 mg/dL (23 Jun 2019 11:57)  POCT Blood Glucose.: 107 mg/dL (23 Jun 2019 07:08)  POCT Blood Glucose.: 145 mg/dL (22 Jun 2019 21:39)  POCT Blood Glucose.: 142 mg/dL (22 Jun 2019 17:06)      06-22 @ 07:01 - 06-23 @ 07:00  --------------------------------------------------------  IN: 980 mL / OUT: 1827 mL / NET: -847 mL    06-23 @ 07:01 - 06-23 @ 12:36  --------------------------------------------------------  IN: 0 mL / OUT: 40 mL / NET: -40 mL        Physical Exam:    Constitutional: WDWN resting comfortably in bed; NAD anicteric sclera  ENT: no nasal discharge; uvula midline, no oropharyngeal erythema or exudates; MMM  Neck: supple; no JVD  Respiratory: CTA B/L; no W/R/R, no retractions  Cardiac: +S1/S2; RRR; no M/R/G; PMI non-displaced  Gastrointestinal: abdomen soft, NT/ND; no rebound or guarding; +BSx4; midline incision with staples, no surrounding erythema, no drainage, CRISS drain in place with serosanguinous drainage  Extremities: WWP, no clubbing or cyanosis; no peripheral edema  : Mello    LABS:                        10.9   7.93  )-----------( 225      ( 23 Jun 2019 07:35 )             33.6     06-23    137  |  105  |  12  ----------------------------<  115<H>  4.3   |  20<L>  |  0.50    Ca    8.3<L>      23 Jun 2019 07:35  Phos  2.9     06-23  Mg     2.2     06-23    TPro  6.5  /  Alb  3.3  /  TBili  0.2  /  DBili  x   /  AST  59<H>  /  ALT  62<H>  /  AlkPhos  54  06-23
Pt seen and examined at bedside. Pt reports having abdominal pain between pain medication doses. Pt not yet ambulating, tolerating clear diet, not yet passing flatus, and has perez in place with adequate UOP.  Pt denies fever, chills, chest pain, SOB, nausea, vomiting, lightheadedness, dizziness.      T(F): 98 (06-19-19 @ 13:32), Max: 99 (06-18-19 @ 21:35)  HR: 110 (06-19-19 @ 13:32) (90 - 120)  BP: 153/97 (06-19-19 @ 13:32) (123/77 - 155/86)  RR: 17 (06-19-19 @ 13:32) (16 - 22)  SpO2: 100% (06-19-19 @ 13:32) (98% - 100%)  Wt(kg): --  I&O's Summary    18 Jun 2019 07:01 - 19 Jun 2019 07:00  --------------------------------------------------------  IN: 6305 mL / OUT: 2855 mL / NET: 3450 mL    19 Jun 2019 07:01  -  19 Jun 2019 17:51  --------------------------------------------------------  IN: 1300 mL / OUT: 1200 mL / NET: 100 mL        MEDICATIONS  (STANDING):  acetaminophen   Tablet .. 975 milliGRAM(s) Oral every 6 hours  BUpivacaine liposome 1.3% Injectable (no eMAR) 20 milliLiter(s) Local Injection once  ceFAZolin   IVPB 1000 milliGRAM(s) IV Intermittent every 8 hours  ceFAZolin   IVPB      docusate sodium 100 milliGRAM(s) Oral two times a day  enoxaparin Injectable 40 milliGRAM(s) SubCutaneous daily  HYDROmorphone PCA (1 mG/mL) 30 milliLiter(s) PCA Continuous PCA Continuous  oxybutynin 5 milliGRAM(s) Oral three times a day  sodium chloride 0.9%. 1000 milliLiter(s) (125 mL/Hr) IV Continuous <Continuous>    MEDICATIONS  (PRN):  metoclopramide Injectable 10 milliGRAM(s) IV Push every 4 hours PRN nausea  naloxone Injectable 0.1 milliGRAM(s) IV Push every 3 minutes PRN For ANY of the following changes in patient status:  A. RR LESS THAN 10 breaths per minute, B. Oxygen saturation LESS THAN 90%, C. Sedation score of 6  ondansetron Injectable 4 milliGRAM(s) IV Push every 6 hours PRN Nausea      Physical Exam:  Constitutional: NAD  Pulmonary: regular work of breathing, appears comfortable  Abdomen: incision site clean, dry, intact. Soft, mildly tender, moderately distended, no guarding, no rebound, +bowel sounds  Extremities: no lower extremity edema or calve tenderness. SCDs in place     LABS:                        8.2    14.14 )-----------( 143      ( 19 Jun 2019 12:40 )             25.0     06-19    139  |  108  |  10  ----------------------------<  207<H>  3.9   |  23  |  0.51    Ca    7.6<L>      19 Jun 2019 07:22  Phos  1.5     06-19  Mg     1.8     06-19    TPro  5.2<L>  /  Alb  3.0<L>  /  TBili  0.3  /  DBili  x   /  AST  29  /  ALT  21  /  AlkPhos  35<L>  06-19    PT/INR - ( 18 Jun 2019 07:29 )   PT: 12.3 sec;   INR: 1.09          PTT - ( 18 Jun 2019 07:29 )  PTT:27.7 sec
GYN PROGRESS NOTE    Patient evaluated at the bedside. No acute events. Patient reports that she feels better this morning. She denies pain. No further episodes of loose stools but she is still passing flatus. Agrees to walk the halls today and to spend the day in the chair.   Denies CP/SOB/dizziness/nausea/vomiting/abdominal pain/calf pain. Patient is tolerating liquids and is hungry. Perez remains in place.     O:   T(C): 36.7 (06-24-19 @ 05:36), Max: 37.1 (06-23-19 @ 21:06)  HR: 100 (06-24-19 @ 05:36) (100 - 110)  BP: 119/85 (06-24-19 @ 05:36) (119/85 - 154/86)  RR: 17 (06-24-19 @ 05:36) (16 - 18)  SpO2: 100% (06-24-19 @ 05:36) (99% - 100%)  Wt(kg): --    GEN: patient appears well, resting comfortably   LUNGS: no respiratory distress  ABD: soft, not distended, +BS, appropriately tender, RLQ CRISS drain in place with minimal serosanguinous fluid   Pelvic: perez draining clear urine   EXT: no calf tenderness        06-23 @ 07:01  -  06-24 @ 07:00  --------------------------------------------------------  IN: 180 mL / OUT: 1663 mL / NET: -1483 mL
GYN PROGRESS NOTE    Patient evaluated at the bedside. Patient awaiting to go down for CT scan to r/o PE. Currently, patient denies SOB/dizziness/nausea/vomiting/abdominal pain/calf pain.   Pain well controlled on oral pain medications. Patient is ambulating independently, passing flatus and tolerating a regular diet. She had several loose bowel movements today.    Vital Signs Last 24 Hrs  T(C): 37.1 (25 Jun 2019 16:26), Max: 37.2 (24 Jun 2019 21:59)  T(F): 98.7 (25 Jun 2019 16:26), Max: 98.9 (24 Jun 2019 21:59)  HR: 110 (25 Jun 2019 16:26) (91 - 110)  BP: 132/81 (25 Jun 2019 16:26) (125/81 - 133/85)  BP(mean): --  RR: 18 (25 Jun 2019 16:26) (17 - 19)  SpO2: 100% (25 Jun 2019 16:26) (99% - 100%)    GEN: patient appears well, resting comfortably, no distress   LUNGS: no respiratory distress, CTAB, chest tender to palpation at mid sternum  ABD: soft, nondistended, minimal tenderness, CRISS drain in place with serosangeous output   Pelvic: peerz draining clear urine  EXT: no calf tenderness, SCDs in place, no edema    I&O's Summary    24 Jun 2019 07:01  -  25 Jun 2019 07:00  --------------------------------------------------------  IN: 940 mL / OUT: 1530 mL / NET: -590 mL    25 Jun 2019 07:01  -  25 Jun 2019 17:15  --------------------------------------------------------  IN: 0 mL / OUT: 800 mL / NET: -800 mL                          11.8   10.34 )-----------( 352      ( 25 Jun 2019 06:52 )             36.0
GYN Post Op Check     Patient seen at bedside.  Pain controlled with PCA.  No OOB yet.  Sarkar in place.  Denies CP, palpitations, SOB, fever, chills, nausea, vomiting. Patient would like to sleep now as she feels tired.     Physical Exam:  Gen: No Acute Distress  GI: soft, appropriately tender, mildly distended, decreased BS, no rebound, no guarding.  Dressing C/D/I  : Sarkar in place - draining blue tinged, clear urine   Ext: SCDs in place, wnl    I&O's Summary    18 Jun 2019 07:01  -  19 Jun 2019 04:15  --------------------------------------------------------  IN: 6055 mL / OUT: 2195 mL / NET: 3860 mL      MEDICATIONS  (STANDING):  acetaminophen   Tablet .. 975 milliGRAM(s) Oral every 6 hours  BUpivacaine liposome 1.3% Injectable (no eMAR) 20 milliLiter(s) Local Injection once  ceFAZolin   IVPB 1000 milliGRAM(s) IV Intermittent every 8 hours  ceFAZolin   IVPB      docusate sodium 100 milliGRAM(s) Oral two times a day  enoxaparin Injectable 40 milliGRAM(s) SubCutaneous daily  HYDROmorphone PCA (1 mG/mL) 30 milliLiter(s) PCA Continuous PCA Continuous  oxybutynin 5 milliGRAM(s) Oral three times a day  sodium chloride 0.9%. 1000 milliLiter(s) (125 mL/Hr) IV Continuous <Continuous>    MEDICATIONS  (PRN):  metoclopramide Injectable 10 milliGRAM(s) IV Push every 4 hours PRN nausea  naloxone Injectable 0.1 milliGRAM(s) IV Push every 3 minutes PRN For ANY of the following changes in patient status:  A. RR LESS THAN 10 breaths per minute, B. Oxygen saturation LESS THAN 90%, C. Sedation score of 6  ondansetron Injectable 4 milliGRAM(s) IV Push every 6 hours PRN Nausea    Allergies    No Known Allergies    Intolerances        LABS:                        11.4   20.27 )-----------( 208      ( 18 Jun 2019 17:45 )             36.1     06-18    135  |  102  |  10  ----------------------------<  258<H>  4.9   |  15<L>  |  0.64    Ca    7.8<L>      18 Jun 2019 17:45    TPro  5.7<L>  /  Alb  3.2<L>  /  TBili  0.3  /  DBili  x   /  AST  24  /  ALT  22  /  AlkPhos  52  06-18    PT/INR - ( 18 Jun 2019 07:29 )   PT: 12.3 sec;   INR: 1.09          PTT - ( 18 Jun 2019 07:29 )  PTT:27.7 sec
GYN Progress Note    Patient seen at bedside this afternoon. Pain is well controlled and improving. States she has only walked around room today, has not walked hallways. States she continues to pass flatus with no nausea or vomiting. Has not had a bowel movement. Continues to tolerate clear liquids without nausea or vomiting.  Sarkar remains in place.   Denies CP, palpitations, SOB, fever, chills, nausea, vomiting.    Vital Signs Last 24 Hrs  T(C): 37.1 (22 Jun 2019 16:50), Max: 37.2 (21 Jun 2019 21:08)  T(F): 98.8 (22 Jun 2019 16:50), Max: 98.9 (21 Jun 2019 21:08)  HR: 94 (22 Jun 2019 16:50) (67 - 107)  BP: 136/92 (22 Jun 2019 16:50) (128/87 - 143/92)  BP(mean): --  RR: 16 (22 Jun 2019 16:50) (15 - 18)  SpO2: 99% (22 Jun 2019 16:50) (97% - 100%)    Physical Exam:  Gen: No Acute Distress, resting comfortable in bed  Pulm: Normal work of breathing, no wheezes/rhonchi/rales   GI: soft, appropriately tender, mildly distended, hypoactive BS, no rebound, no guarding, midline Incision site clean/dry/intact   Ext: SCDs in place, no edema/erythema/tenderness     I&O's Summary    21 Jun 2019 07:01  -  22 Jun 2019 07:00  --------------------------------------------------------  IN: 300 mL / OUT: 2325 mL / NET: -2025 mL    22 Jun 2019 07:01  -  22 Jun 2019 18:50  --------------------------------------------------------  IN: 560 mL / OUT: 1060 mL / NET: -500 mL      MEDICATIONS  (STANDING):  acetaminophen   Tablet .. 975 milliGRAM(s) Oral every 6 hours  BUpivacaine liposome 1.3% Injectable (no eMAR) 20 milliLiter(s) Local Injection once  dextrose 5%. 1000 milliLiter(s) (50 mL/Hr) IV Continuous <Continuous>  dextrose 50% Injectable 12.5 Gram(s) IV Push once  dextrose 50% Injectable 25 Gram(s) IV Push once  dextrose 50% Injectable 25 Gram(s) IV Push once  docusate sodium 100 milliGRAM(s) Oral two times a day  enoxaparin Injectable 40 milliGRAM(s) SubCutaneous daily  ibuprofen  Tablet. 600 milliGRAM(s) Oral every 6 hours  insulin lispro (HumaLOG) corrective regimen sliding scale   SubCutaneous Before meals and at bedtime  lisinopril 20 milliGRAM(s) Oral daily  oxybutynin 5 milliGRAM(s) Oral three times a day  senna 1 Tablet(s) Oral daily  trimethoprim  160 mG/sulfamethoxazole 800 mG 1 Tablet(s) Oral every 12 hours    MEDICATIONS  (PRN):  dextrose 40% Gel 15 Gram(s) Oral once PRN Blood Glucose LESS THAN 70 milliGRAM(s)/deciliter  glucagon  Injectable 1 milliGRAM(s) IntraMuscular once PRN Glucose LESS THAN 70 milligrams/deciliter  metoclopramide Injectable 10 milliGRAM(s) IV Push every 4 hours PRN nausea  naloxone Injectable 0.1 milliGRAM(s) IV Push every 3 minutes PRN For ANY of the following changes in patient status:  A. RR LESS THAN 10 breaths per minute, B. Oxygen saturation LESS THAN 90%, C. Sedation score of 6  ondansetron Injectable 4 milliGRAM(s) IV Push every 6 hours PRN Nausea  oxyCODONE    IR 5 milliGRAM(s) Oral every 4 hours PRN Moderate Pain (4 - 6)  oxyCODONE    IR 10 milliGRAM(s) Oral every 6 hours PRN Severe Pain (7 - 10)      LABS:                        10.6   9.65  )-----------( 214      ( 22 Jun 2019 06:04 )             33.0     06-22    136  |  100  |  12  ----------------------------<  129<H>  3.4<L>   |  23  |  0.71    Ca    8.4      22 Jun 2019 06:04  Phos  3.9     06-22  Mg     2.4     06-22
GYN Progress Note    Patient seen at bedside this morning, resting comfortable in bed, slept well throughout night without issues.   Pain is well controlled on motrin, tylenol, and oxycodone.   Tolerating  clear liquid diet , passing flatus. Had one small formed BM this morning.  Ambulating without difficulty. States she walked hallways yesterday.   Denies CP, palpitations, SOB, fever, chills, nausea, vomiting.    Vital Signs Last 24 Hrs  T(C): 36.9 (23 Jun 2019 05:47), Max: 37.3 (22 Jun 2019 20:19)  T(F): 98.4 (23 Jun 2019 05:47), Max: 99.1 (22 Jun 2019 20:19)  HR: 101 (23 Jun 2019 05:47) (67 - 101)  BP: 124/88 (23 Jun 2019 05:47) (123/82 - 137/87)  BP(mean): --  RR: 17 (23 Jun 2019 05:47) (15 - 17)  SpO2: 98% (23 Jun 2019 05:47) (98% - 100%)    Physical Exam:  Gen: No Acute Distress, resting comfortable in bed  Pulm: Normal work of breathing, no wheezes/rhonchi/rales   GI: soft, appropriately tender, minimal distended, normoactive BS, no rebound, no guarding, midline Incision site clean/dry/intact, RLQ CRISS in site with serosanguinous output  Ext: SCDs in place, no edema/erythema/tenderness     I&O's Summary    21 Jun 2019 07:01  -  22 Jun 2019 07:00  --------------------------------------------------------  IN: 300 mL / OUT: 2325 mL / NET: -2025 mL    22 Jun 2019 07:01  -  23 Jun 2019 06:04  --------------------------------------------------------  IN: 980 mL / OUT: 1827 mL / NET: -847 mL      MEDICATIONS  (STANDING):  acetaminophen   Tablet .. 975 milliGRAM(s) Oral every 6 hours  BUpivacaine liposome 1.3% Injectable (no eMAR) 20 milliLiter(s) Local Injection once  dextrose 5%. 1000 milliLiter(s) (50 mL/Hr) IV Continuous <Continuous>  dextrose 50% Injectable 12.5 Gram(s) IV Push once  dextrose 50% Injectable 25 Gram(s) IV Push once  dextrose 50% Injectable 25 Gram(s) IV Push once  docusate sodium 100 milliGRAM(s) Oral two times a day  enoxaparin Injectable 40 milliGRAM(s) SubCutaneous daily  ibuprofen  Tablet. 600 milliGRAM(s) Oral every 6 hours  insulin lispro (HumaLOG) corrective regimen sliding scale   SubCutaneous Before meals and at bedtime  lisinopril 20 milliGRAM(s) Oral daily  oxybutynin 5 milliGRAM(s) Oral three times a day  senna 1 Tablet(s) Oral daily  trimethoprim  160 mG/sulfamethoxazole 800 mG 1 Tablet(s) Oral every 12 hours    MEDICATIONS  (PRN):  dextrose 40% Gel 15 Gram(s) Oral once PRN Blood Glucose LESS THAN 70 milliGRAM(s)/deciliter  glucagon  Injectable 1 milliGRAM(s) IntraMuscular once PRN Glucose LESS THAN 70 milligrams/deciliter  metoclopramide Injectable 10 milliGRAM(s) IV Push every 4 hours PRN nausea  naloxone Injectable 0.1 milliGRAM(s) IV Push every 3 minutes PRN For ANY of the following changes in patient status:  A. RR LESS THAN 10 breaths per minute, B. Oxygen saturation LESS THAN 90%, C. Sedation score of 6  ondansetron Injectable 4 milliGRAM(s) IV Push every 6 hours PRN Nausea  oxyCODONE    IR 5 milliGRAM(s) Oral every 4 hours PRN Moderate Pain (4 - 6)  oxyCODONE    IR 10 milliGRAM(s) Oral every 6 hours PRN Severe Pain (7 - 10)      LABS:                        10.6   9.65  )-----------( 214      ( 22 Jun 2019 06:04 )             33.0     06-22    136  |  100  |  12  ----------------------------<  129<H>  3.4<L>   |  23  |  0.71    Ca    8.4      22 Jun 2019 06:04  Phos  3.9     06-22  Mg     2.4     06-22
GYN Progress Note    Patient seen at bedside this morning. Resting comfortable in bed. Pain improved and controlled on tylenol and toradol.   Overnight has one episode of vomiting, previously tolerated clears. This morning tolerating water, no further nausea or vomiting.   Ambulating without difficulty, worked with PT yesterday.  Sarkar remains in place.   Denies CP, palpitations, SOB, fever, chills, nausea, vomiting.    Vital Signs Last 24 Hrs  T(C): 36.7 (21 Jun 2019 05:24), Max: 37.1 (20 Jun 2019 21:16)  T(F): 98.1 (21 Jun 2019 05:24), Max: 98.8 (20 Jun 2019 21:16)  HR: 102 (21 Jun 2019 05:24) (86 - 116)  BP: 134/82 (21 Jun 2019 05:24) (134/82 - 168/99)  BP(mean): --  RR: 17 (21 Jun 2019 05:24) (16 - 18)  SpO2: 98% (21 Jun 2019 05:24) (97% - 100%)    Physical Exam:  Gen: No Acute Distress, resting comfortable in bed  Pulm: Normal work of breathing, no wheezes/rhonchi/rales   GI: soft, appropriately tender, mildly distended, hypoactive BS, no rebound, no guarding, midline incision site clean/dry/intact   Ext: SCDs in place, no edema/erythema/tenderness     I&O's Summary    20 Jun 2019 07:01  -  21 Jun 2019 07:00  --------------------------------------------------------  IN: 850 mL / OUT: 2925 mL / NET: -2075 mL      MEDICATIONS  (STANDING):  acetaminophen   Tablet .. 975 milliGRAM(s) Oral every 6 hours  BUpivacaine liposome 1.3% Injectable (no eMAR) 20 milliLiter(s) Local Injection once  dextrose 5%. 1000 milliLiter(s) (50 mL/Hr) IV Continuous <Continuous>  dextrose 50% Injectable 12.5 Gram(s) IV Push once  dextrose 50% Injectable 25 Gram(s) IV Push once  dextrose 50% Injectable 25 Gram(s) IV Push once  docusate sodium 100 milliGRAM(s) Oral two times a day  enoxaparin Injectable 40 milliGRAM(s) SubCutaneous daily  insulin lispro (HumaLOG) corrective regimen sliding scale   SubCutaneous Before meals and at bedtime  ketorolac   Injectable 30 milliGRAM(s) IV Push every 6 hours  lisinopril 10 milliGRAM(s) Oral daily  nitrofurantoin monohydrate/macrocrystals (MACROBID) 100 milliGRAM(s) Oral every 12 hours  oxybutynin 5 milliGRAM(s) Oral three times a day    MEDICATIONS  (PRN):  dextrose 40% Gel 15 Gram(s) Oral once PRN Blood Glucose LESS THAN 70 milliGRAM(s)/deciliter  glucagon  Injectable 1 milliGRAM(s) IntraMuscular once PRN Glucose LESS THAN 70 milligrams/deciliter  metoclopramide Injectable 10 milliGRAM(s) IV Push every 4 hours PRN nausea  naloxone Injectable 0.1 milliGRAM(s) IV Push every 3 minutes PRN For ANY of the following changes in patient status:  A. RR LESS THAN 10 breaths per minute, B. Oxygen saturation LESS THAN 90%, C. Sedation score of 6  ondansetron Injectable 4 milliGRAM(s) IV Push every 6 hours PRN Nausea  oxyCODONE    IR 5 milliGRAM(s) Oral every 4 hours PRN Moderate Pain (4 - 6)  oxyCODONE    IR 10 milliGRAM(s) Oral every 6 hours PRN Severe Pain (7 - 10)      LABS:                        9.7    12.69 )-----------( 144      ( 20 Jun 2019 07:53 )             29.3     06-20    140  |  106  |  7   ----------------------------<  144<H>  3.8   |  24  |  0.44<L>    Ca    8.1<L>      20 Jun 2019 07:53  Phos  2.1     06-20  Mg     2.2     06-20    TPro  6.0  /  Alb  3.2<L>  /  TBili  0.2  /  DBili  x   /  AST  27  /  ALT  20  /  AlkPhos  43  06-20
GYN Progress Note    Patient seen at bedside this morning. Resting comfortable in bed. States pain is better controlled this morning. Tolerating water, has not passed flatus yet. Has not gotten out of bed yet.   Sarkar remains in place.   Denies CP, palpitations, SOB, fever, chills, nausea, vomiting.    Vital Signs Last 24 Hrs  T(C): 36.4 (20 Jun 2019 05:42), Max: 37.4 (19 Jun 2019 17:35)  T(F): 97.6 (20 Jun 2019 05:42), Max: 99.4 (19 Jun 2019 17:35)  HR: 92 (20 Jun 2019 05:42) (92 - 115)  BP: 154/83 (20 Jun 2019 05:42) (140/88 - 164/84)  BP(mean): --  RR: 18 (20 Jun 2019 05:42) (16 - 20)  SpO2: 100% (20 Jun 2019 05:42) (98% - 100%)    Physical Exam:  Gen: No Acute Distress, resting comfortable in bed  Pulm: Normal work of breathing, no wheezes/rhonchi/rales   GI: soft, appropriately tender, mildly distended, normoactive BS, no rebound, no guarding, midline Incision site clean/dry/intact , CRISS with serosanguinous output   Ext: SCDs in place, no edema/erythema/tenderness     I&O's Summary    19 Jun 2019 07:01  -  20 Jun 2019 07:00  --------------------------------------------------------  IN: 3195.5 mL / OUT: 4770 mL / NET: -1574.5 mL      MEDICATIONS  (STANDING):  acetaminophen   Tablet .. 975 milliGRAM(s) Oral every 6 hours  BUpivacaine liposome 1.3% Injectable (no eMAR) 20 milliLiter(s) Local Injection once  ceFAZolin   IVPB 1000 milliGRAM(s) IV Intermittent every 8 hours  ceFAZolin   IVPB      dextrose 5%. 1000 milliLiter(s) (50 mL/Hr) IV Continuous <Continuous>  dextrose 50% Injectable 12.5 Gram(s) IV Push once  dextrose 50% Injectable 25 Gram(s) IV Push once  dextrose 50% Injectable 25 Gram(s) IV Push once  docusate sodium 100 milliGRAM(s) Oral two times a day  enoxaparin Injectable 40 milliGRAM(s) SubCutaneous daily  HYDROmorphone PCA (1 mG/mL) 30 milliLiter(s) PCA Continuous PCA Continuous  insulin lispro (HumaLOG) corrective regimen sliding scale   SubCutaneous Before meals and at bedtime  oxybutynin 5 milliGRAM(s) Oral three times a day  sodium chloride 0.9%. 1000 milliLiter(s) (125 mL/Hr) IV Continuous <Continuous>    MEDICATIONS  (PRN):  dextrose 40% Gel 15 Gram(s) Oral once PRN Blood Glucose LESS THAN 70 milliGRAM(s)/deciliter  glucagon  Injectable 1 milliGRAM(s) IntraMuscular once PRN Glucose LESS THAN 70 milligrams/deciliter  metoclopramide Injectable 10 milliGRAM(s) IV Push every 4 hours PRN nausea  naloxone Injectable 0.1 milliGRAM(s) IV Push every 3 minutes PRN For ANY of the following changes in patient status:  A. RR LESS THAN 10 breaths per minute, B. Oxygen saturation LESS THAN 90%, C. Sedation score of 6  ondansetron Injectable 4 milliGRAM(s) IV Push every 6 hours PRN Nausea      LABS:                        8.2    14.14 )-----------( 143      ( 19 Jun 2019 12:40 )             25.0     06-19    139  |  108  |  10  ----------------------------<  207<H>  3.9   |  23  |  0.51    Ca    7.6<L>      19 Jun 2019 07:22  Phos  1.5     06-19  Mg     1.8     06-19    TPro  5.2<L>  /  Alb  3.0<L>  /  TBili  0.3  /  DBili  x   /  AST  29  /  ALT  21  /  AlkPhos  35<L>  06-19    PT/INR - ( 18 Jun 2019 07:29 )   PT: 12.3 sec;   INR: 1.09          PTT - ( 18 Jun 2019 07:29 )  PTT:27.7 sec
GYN Progress Note    Patient seen at bedside this morning. Resting comfortably in bed. States overnight pain was controlled with Dilaudid PCA. Pain mostly at incision site. Has not been out of bed yet. Perez remains in place. Tolerated sips with medications.  Denies CP, palpitations, SOB, fever, chills, nausea, vomiting.    Vital Signs Last 24 Hrs  T(C): 36.6 (19 Jun 2019 05:25), Max: 37.4 (18 Jun 2019 17:25)  T(F): 97.9 (19 Jun 2019 05:25), Max: 99.4 (18 Jun 2019 17:25)  HR: 120 (19 Jun 2019 05:25) (88 - 120)  BP: 140/84 (19 Jun 2019 05:25) (110/69 - 155/86)  BP(mean): 108 (18 Jun 2019 20:30) (90 - 111)  RR: 17 (19 Jun 2019 05:25) (16 - 25)  SpO2: 98% (19 Jun 2019 05:25) (98% - 100%)    Physical Exam:  Gen: No Acute Distress, resting comfortable in bed  Pulm: Normal work of breathing, no wheezes/rhonchi/rales   GI: soft, appropriately tender, nondistended, +BS, no rebound, no guarding, Midline Incision site clean/dry/intact   : perez in place with yellow concentrated urine   Ext: SCDs in place, no edema/erythema/tenderness     I&O's Summary    18 Jun 2019 07:01  -  19 Jun 2019 06:48  --------------------------------------------------------  IN: 6305 mL / OUT: 2855 mL / NET: 3450 mL      MEDICATIONS  (STANDING):  acetaminophen   Tablet .. 975 milliGRAM(s) Oral every 6 hours  BUpivacaine liposome 1.3% Injectable (no eMAR) 20 milliLiter(s) Local Injection once  ceFAZolin   IVPB 1000 milliGRAM(s) IV Intermittent every 8 hours  ceFAZolin   IVPB      docusate sodium 100 milliGRAM(s) Oral two times a day  enoxaparin Injectable 40 milliGRAM(s) SubCutaneous daily  HYDROmorphone PCA (1 mG/mL) 30 milliLiter(s) PCA Continuous PCA Continuous  oxybutynin 5 milliGRAM(s) Oral three times a day  sodium chloride 0.9%. 1000 milliLiter(s) (125 mL/Hr) IV Continuous <Continuous>    MEDICATIONS  (PRN):  metoclopramide Injectable 10 milliGRAM(s) IV Push every 4 hours PRN nausea  naloxone Injectable 0.1 milliGRAM(s) IV Push every 3 minutes PRN For ANY of the following changes in patient status:  A. RR LESS THAN 10 breaths per minute, B. Oxygen saturation LESS THAN 90%, C. Sedation score of 6  ondansetron Injectable 4 milliGRAM(s) IV Push every 6 hours PRN Nausea      LABS:                        11.4   20.27 )-----------( 208      ( 18 Jun 2019 17:45 )             36.1     06-18    135  |  102  |  10  ----------------------------<  258<H>  4.9   |  15<L>  |  0.64    Ca    7.8<L>      18 Jun 2019 17:45    TPro  5.7<L>  /  Alb  3.2<L>  /  TBili  0.3  /  DBili  x   /  AST  24  /  ALT  22  /  AlkPhos  52  06-18    PT/INR - ( 18 Jun 2019 07:29 )   PT: 12.3 sec;   INR: 1.09          PTT - ( 18 Jun 2019 07:29 )  PTT:27.7 sec
GYN Progress Note    Patient seen at bedside.  Pain controlled on current regimen.   Tolerating clear liquid diet. Looking forward to full liquids. Passing a small amount of flatus.  Has not been OOB today, waiting for nursing aide to help her.  Sarkar in place    Denies CP, palpitations, SOB, fever, chills, nausea, vomiting.    Vital Signs Last 24 Hrs  T(C): 36.7 (23 Jun 2019 13:00), Max: 37.3 (22 Jun 2019 20:19)  T(F): 98 (23 Jun 2019 13:00), Max: 99.1 (22 Jun 2019 20:19)  HR: 106 (23 Jun 2019 13:00) (94 - 106)  BP: 138/88 (23 Jun 2019 13:00) (123/82 - 138/91)  BP(mean): --  RR: 16 (23 Jun 2019 13:00) (16 - 17)  SpO2: 99% (23 Jun 2019 13:00) (98% - 100%)    Physical Exam:  Gen: No Acute Distress  Pulm: Normal work of breathing  GI: soft, appropriately tender, moderately distended, +BS, no rebound, no guarding.  Incision C/D/I  Sarkar: appears more turbid in color today  Ext: SCDs in place, wnl    I&O's Summary    22 Jun 2019 07:01  -  23 Jun 2019 07:00  --------------------------------------------------------  IN: 980 mL / OUT: 1827 mL / NET: -847 mL    23 Jun 2019 07:01  -  23 Jun 2019 15:37  --------------------------------------------------------  IN: 0 mL / OUT: 640 mL / NET: -640 mL      MEDICATIONS  (STANDING):  acetaminophen   Tablet .. 975 milliGRAM(s) Oral every 6 hours  BUpivacaine liposome 1.3% Injectable (no eMAR) 20 milliLiter(s) Local Injection once  dextrose 5%. 1000 milliLiter(s) (50 mL/Hr) IV Continuous <Continuous>  dextrose 50% Injectable 12.5 Gram(s) IV Push once  dextrose 50% Injectable 25 Gram(s) IV Push once  dextrose 50% Injectable 25 Gram(s) IV Push once  docusate sodium 100 milliGRAM(s) Oral two times a day  enoxaparin Injectable 40 milliGRAM(s) SubCutaneous daily  ibuprofen  Tablet. 600 milliGRAM(s) Oral every 6 hours  insulin lispro (HumaLOG) corrective regimen sliding scale   SubCutaneous Before meals and at bedtime  lisinopril 20 milliGRAM(s) Oral daily  oxybutynin 5 milliGRAM(s) Oral three times a day  pantoprazole  Injectable 40 milliGRAM(s) IV Push daily  senna 1 Tablet(s) Oral daily  trimethoprim  160 mG/sulfamethoxazole 800 mG 1 Tablet(s) Oral every 12 hours    MEDICATIONS  (PRN):  dextrose 40% Gel 15 Gram(s) Oral once PRN Blood Glucose LESS THAN 70 milliGRAM(s)/deciliter  glucagon  Injectable 1 milliGRAM(s) IntraMuscular once PRN Glucose LESS THAN 70 milligrams/deciliter  metoclopramide Injectable 10 milliGRAM(s) IV Push every 4 hours PRN nausea  naloxone Injectable 0.1 milliGRAM(s) IV Push every 3 minutes PRN For ANY of the following changes in patient status:  A. RR LESS THAN 10 breaths per minute, B. Oxygen saturation LESS THAN 90%, C. Sedation score of 6  ondansetron Injectable 4 milliGRAM(s) IV Push every 6 hours PRN Nausea  oxyCODONE    IR 5 milliGRAM(s) Oral every 4 hours PRN Moderate Pain (4 - 6)  oxyCODONE    IR 10 milliGRAM(s) Oral every 6 hours PRN Severe Pain (7 - 10)      LABS:                        10.9   7.93  )-----------( 225      ( 23 Jun 2019 07:35 )             33.6     06-23    137  |  105  |  12  ----------------------------<  115<H>  4.3   |  20<L>  |  0.50    Ca    8.3<L>      23 Jun 2019 07:35  Phos  2.9     06-23  Mg     2.2     06-23    TPro  6.5  /  Alb  3.3  /  TBili  0.2  /  DBili  x   /  AST  59<H>  /  ALT  62<H>  /  AlkPhos  54  06-23
GYN Progress Note    Patient seen at bedside.  Pain controlled on current regimen.   Tolerating clears.   OOB, passing flatus.   Sarkar in place.    Denies CP, palpitations, SOB, fever, chills, nausea, vomiting.    Vital Signs Last 24 Hrs  T(C): 37.1 (22 Jun 2019 09:25), Max: 37.2 (21 Jun 2019 21:08)  T(F): 98.8 (22 Jun 2019 09:25), Max: 98.9 (21 Jun 2019 21:08)  HR: 96 (22 Jun 2019 09:25) (86 - 107)  BP: 137/87 (22 Jun 2019 09:25) (128/87 - 143/92)  BP(mean): --  RR: 16 (22 Jun 2019 09:25) (16 - 18)  SpO2: 100% (22 Jun 2019 09:25) (97% - 100%)    Physical Exam:  Gen: No Acute Distress  Pulm: Normal work of breathing  GI: soft, appropriately tender, nondistended, +BS, no rebound, no guarding.  Incision C/D/I  Ext: SCDs in place, wnl    I&O's Summary    21 Jun 2019 07:01  -  22 Jun 2019 07:00  --------------------------------------------------------  IN: 300 mL / OUT: 2325 mL / NET: -2025 mL    22 Jun 2019 07:01  -  22 Jun 2019 10:12  --------------------------------------------------------  IN: 0 mL / OUT: 60 mL / NET: -60 mL      MEDICATIONS  (STANDING):  acetaminophen   Tablet .. 975 milliGRAM(s) Oral every 6 hours  BUpivacaine liposome 1.3% Injectable (no eMAR) 20 milliLiter(s) Local Injection once  dextrose 5%. 1000 milliLiter(s) (50 mL/Hr) IV Continuous <Continuous>  dextrose 50% Injectable 12.5 Gram(s) IV Push once  dextrose 50% Injectable 25 Gram(s) IV Push once  dextrose 50% Injectable 25 Gram(s) IV Push once  docusate sodium 100 milliGRAM(s) Oral two times a day  enoxaparin Injectable 40 milliGRAM(s) SubCutaneous daily  ibuprofen  Tablet. 600 milliGRAM(s) Oral every 6 hours  insulin lispro (HumaLOG) corrective regimen sliding scale   SubCutaneous Before meals and at bedtime  lisinopril 20 milliGRAM(s) Oral daily  oxybutynin 5 milliGRAM(s) Oral three times a day  trimethoprim  160 mG/sulfamethoxazole 800 mG 1 Tablet(s) Oral every 12 hours    MEDICATIONS  (PRN):  dextrose 40% Gel 15 Gram(s) Oral once PRN Blood Glucose LESS THAN 70 milliGRAM(s)/deciliter  glucagon  Injectable 1 milliGRAM(s) IntraMuscular once PRN Glucose LESS THAN 70 milligrams/deciliter  metoclopramide Injectable 10 milliGRAM(s) IV Push every 4 hours PRN nausea  naloxone Injectable 0.1 milliGRAM(s) IV Push every 3 minutes PRN For ANY of the following changes in patient status:  A. RR LESS THAN 10 breaths per minute, B. Oxygen saturation LESS THAN 90%, C. Sedation score of 6  ondansetron Injectable 4 milliGRAM(s) IV Push every 6 hours PRN Nausea  oxyCODONE    IR 5 milliGRAM(s) Oral every 4 hours PRN Moderate Pain (4 - 6)  oxyCODONE    IR 10 milliGRAM(s) Oral every 6 hours PRN Severe Pain (7 - 10)      LABS:                        10.6   9.65  )-----------( 214      ( 22 Jun 2019 06:04 )             33.0     06-22    136  |  100  |  12  ----------------------------<  129<H>  3.4<L>   |  23  |  0.71    Ca    8.4      22 Jun 2019 06:04  Phos  3.9     06-22  Mg     2.4     06-22
Interval Events:   YOUSUF, tachycardia improved    SUBJECTIVE:   Patient seen and examined by bedside. Reports moderate incisional pain, but otherwise no other complaints. No chest pain, sob, fevers, chills or rigors. Tolerating sips, denies nausea or emesis. No return of bowel function yet. Encouraged to try to ambulate if she feels steady on feet. Perez remains in place and urine output is adequate.      Vitals - Range in last 12h  T(F): , Max: 98.5 (06-20-19 @ 09:30)  HR:  (86 - 92)  BP:  (146/93 - 168/99)  BP(mean): --  ABP: --  ABP(mean): --  RR:  (16 - 18)  SpO2:  (97% - 100%)  CVP(mm Hg): --  CVP(cm H2O): --  CO: --  CI: --  PA: --  PA(mean): --  PA(direct): --  PCWP: --    Vitals - Most Recent  T(F): 98.3 (06-20-19 @ 13:42)  HR: 86 (06-20-19 @ 13:42)  BP: 159/91 (06-20-19 @ 13:42)  RR: 18 (06-20-19 @ 13:42)  SpO2: 100% (06-20-19 @ 13:42)  I&O's Detail    19 Jun 2019 07:01  -  20 Jun 2019 07:00  --------------------------------------------------------  IN:    IV PiggyBack: 598 mL    Oral Fluid: 60 mL    Packed Red Blood Cells: 350 mL    sodium chloride 0.9%.: 2187.5 mL  Total IN: 3195.5 mL    OUT:    Bulb: 240 mL    Indwelling Catheter - Urethral: 4550 mL  Total OUT: 4790 mL    Total NET: -1594.5 mL      20 Jun 2019 07:01  -  20 Jun 2019 16:00  --------------------------------------------------------  IN:    sodium chloride 0.9%.: 750 mL  Total IN: 750 mL    OUT:    Bulb: 50 mL    Indwelling Catheter - Urethral: 1350 mL  Total OUT: 1400 mL    Total NET: -650 mL            Physical Exam  Physical Exam  Gen:  NAD, resting comfortably  HEENT: no jvd, mmm  Pulm:  no respiratory distress, nonlabored breathing  C/V: tachycardic to 110, rrr,   Abd: soft, NT/ND, midline dressing in place, cdi, drain w/ serosanguinous output  : peerz in place w/ clear yellow urine output, 1700 cc overnight  Extrem: warm and well-perfused, non edematous        LABS:                        9.7    12.69 )-----------( 144      ( 20 Jun 2019 07:53 )             29.3     06-20    140  |  106  |  7   ----------------------------<  144<H>  3.8   |  24  |  0.44<L>    Ca    8.1<L>      20 Jun 2019 07:53  Phos  2.1     06-20  Mg     2.2     06-20    TPro  6.0  /  Alb  3.2<L>  /  TBili  0.2  /  DBili  x   /  AST  27  /  ALT  20  /  AlkPhos  43  06-20    LIVER FUNCTIONS - ( 20 Jun 2019 07:53 )  Alb: 3.2 g/dL / Pro: 6.0 g/dL / ALK PHOS: 43 U/L / ALT: 20 U/L / AST: 27 U/L / GGT: x                   MEDICATIONS  (STANDING):  acetaminophen   Tablet .. 975 milliGRAM(s) Oral every 6 hours  BUpivacaine liposome 1.3% Injectable (no eMAR) 20 milliLiter(s) Local Injection once  dextrose 5%. 1000 milliLiter(s) (50 mL/Hr) IV Continuous <Continuous>  dextrose 50% Injectable 12.5 Gram(s) IV Push once  dextrose 50% Injectable 25 Gram(s) IV Push once  dextrose 50% Injectable 25 Gram(s) IV Push once  docusate sodium 100 milliGRAM(s) Oral two times a day  enoxaparin Injectable 40 milliGRAM(s) SubCutaneous daily  insulin lispro (HumaLOG) corrective regimen sliding scale   SubCutaneous Before meals and at bedtime  ketorolac   Injectable 30 milliGRAM(s) IV Push every 6 hours  nitrofurantoin monohydrate/macrocrystals (MACROBID) 100 milliGRAM(s) Oral every 12 hours  oxybutynin 5 milliGRAM(s) Oral three times a day  potassium acid phosphate/sodium acid phosphate tablet (K-PHOS No. 2) 1 Tablet(s) Oral three times a day with meals  sodium chloride 0.9%. 1000 milliLiter(s) (125 mL/Hr) IV Continuous <Continuous>    MEDICATIONS  (PRN):  dextrose 40% Gel 15 Gram(s) Oral once PRN Blood Glucose LESS THAN 70 milliGRAM(s)/deciliter  glucagon  Injectable 1 milliGRAM(s) IntraMuscular once PRN Glucose LESS THAN 70 milligrams/deciliter  metoclopramide Injectable 10 milliGRAM(s) IV Push every 4 hours PRN nausea  naloxone Injectable 0.1 milliGRAM(s) IV Push every 3 minutes PRN For ANY of the following changes in patient status:  A. RR LESS THAN 10 breaths per minute, B. Oxygen saturation LESS THAN 90%, C. Sedation score of 6  ondansetron Injectable 4 milliGRAM(s) IV Push every 6 hours PRN Nausea  oxyCODONE    IR 5 milliGRAM(s) Oral every 4 hours PRN Moderate Pain (4 - 6)  oxyCODONE    IR 10 milliGRAM(s) Oral every 6 hours PRN Severe Pain (7 - 10)      RADIOLOGY & ADDITIONAL STUDIES:
Interval Events:  YOUSUF overnight. Tolerating sips. tachycardic to 110-120    SUBJECTIVE:   Patient seen and examined by bedside in AM. Doing well. Pain well-controlled. Denies abdominal pain, chest pain, shortness of breath, nausea, vomiting, fevers or chills.       Vitals - Range in last 12h  T(F): , Max: 99 (06-18-19 @ 21:35)  HR:  (102 - 120)  BP:  (133/87 - 144/95)  BP(mean):  (102 - 108)  ABP: --  ABP(mean): --  RR:  (17 - 18)  SpO2:  (98% - 100%)  CVP(mm Hg): --  CVP(cm H2O): --  CO: --  CI: --  PA: --  PA(mean): --  PA(direct): --  PCWP: --    Vitals - Most Recent  T(F): 98 (06-19-19 @ 07:00)  HR: 111 (06-19-19 @ 07:00)  BP: 133/87 (06-19-19 @ 07:00)  RR: 17 (06-19-19 @ 07:00)  SpO2: 98% (06-19-19 @ 07:00)  I&O's Detail    18 Jun 2019 07:01  -  19 Jun 2019 07:00  --------------------------------------------------------  IN:    Oral Fluid: 120 mL    Other: 4685 mL    Sodium Chloride 0.9% IV Bolus: 500 mL    sodium chloride 0.9%.: 1000 mL  Total IN: 6305 mL    OUT:    Bulb: 110 mL    Estimated Blood Loss: 800 mL    Indwelling Catheter - Urethral: 1945 mL  Total OUT: 2855 mL    Total NET: 3450 mL            Physical Exam  Gen:  NAD, resting comfortably  HEENT: no jvd, mmm  Pulm:  no respiratory distress, nonlabored breathing  C/V: tachycardic to 110, rrr,   Abd: soft, NT/ND, midline dressing in place, cdi, drain w/ serosanguinous output  : perez in place w/ clear yellow urine output, 1700 cc overnight  Extrem: warm and well-perfused, non edematous        LABS:                        8.1    13.97 )-----------( 142      ( 19 Jun 2019 07:22 )             24.1     06-18    135  |  102  |  10  ----------------------------<  258<H>  4.9   |  15<L>  |  0.64    Ca    7.8<L>      18 Jun 2019 17:45    TPro  5.7<L>  /  Alb  3.2<L>  /  TBili  0.3  /  DBili  x   /  AST  24  /  ALT  22  /  AlkPhos  52  06-18    LIVER FUNCTIONS - ( 18 Jun 2019 17:45 )  Alb: 3.2 g/dL / Pro: 5.7 g/dL / ALK PHOS: 52 U/L / ALT: 22 U/L / AST: 24 U/L / GGT: x           PT/INR - ( 18 Jun 2019 07:29 )   PT: 12.3 sec;   INR: 1.09          PTT - ( 18 Jun 2019 07:29 )  PTT:27.7 sec        MEDICATIONS  (STANDING):  acetaminophen   Tablet .. 975 milliGRAM(s) Oral every 6 hours  BUpivacaine liposome 1.3% Injectable (no eMAR) 20 milliLiter(s) Local Injection once  ceFAZolin   IVPB 1000 milliGRAM(s) IV Intermittent every 8 hours  ceFAZolin   IVPB      docusate sodium 100 milliGRAM(s) Oral two times a day  enoxaparin Injectable 40 milliGRAM(s) SubCutaneous daily  HYDROmorphone PCA (1 mG/mL) 30 milliLiter(s) PCA Continuous PCA Continuous  oxybutynin 5 milliGRAM(s) Oral three times a day  sodium chloride 0.9%. 1000 milliLiter(s) (125 mL/Hr) IV Continuous <Continuous>    MEDICATIONS  (PRN):  metoclopramide Injectable 10 milliGRAM(s) IV Push every 4 hours PRN nausea  naloxone Injectable 0.1 milliGRAM(s) IV Push every 3 minutes PRN For ANY of the following changes in patient status:  A. RR LESS THAN 10 breaths per minute, B. Oxygen saturation LESS THAN 90%, C. Sedation score of 6  ondansetron Injectable 4 milliGRAM(s) IV Push every 6 hours PRN Nausea      RADIOLOGY & ADDITIONAL STUDIES:
Interval Events:  YOUSUF, AF, sinus tachycardic to 100, hgb stable over weekend, CRISS drain remains serosanguinous    SUBJECTIVE:   Patient seen and examined by bedside. Incisional pain well controlled. Passing flatus. BM over weekend but no BM in last 24 hours. Tolerating FLD. Has been ambulating, without issue. No chest pain, shortness of breath, calf tightness, or dizziness.       Vitals - Range in last 12h  T(F): , Max: 98.8 (06-23-19 @ 21:06)  HR:  (100 - 110)  BP:  (119/85 - 154/86)  BP(mean): --  ABP: --  ABP(mean): --  RR:  (17 - 18)  SpO2:  (100% - 100%)  CVP(mm Hg): --  CVP(cm H2O): --  CO: --  CI: --  PA: --  PA(mean): --  PA(direct): --  PCWP: --    Vitals - Most Recent  T(F): 98 (06-24-19 @ 05:36)  HR: 100 (06-24-19 @ 05:36)  BP: 119/85 (06-24-19 @ 05:36)  RR: 17 (06-24-19 @ 05:36)  SpO2: 100% (06-24-19 @ 05:36)  I&O's Detail    23 Jun 2019 07:01  -  24 Jun 2019 07:00  --------------------------------------------------------  IN:    Oral Fluid: 180 mL  Total IN: 180 mL    OUT:    Bulb: 163 mL    Indwelling Catheter - Urethral: 1500 mL  Total OUT: 1663 mL    Total NET: -1483 mL            Physical Exam  Gen:  NAD, resting comfortably  HEENT: no jvd, mmm  Pulm:  no respiratory distress, nonlabored breathing  C/V: sinus tachycardia to 100, rrr  Abd: soft, NT/ND, midline dressing in place, cdi, drain w/ serosanguinous output  : perez in place w/ clear yellow urine output  Extrem: warm and well-perfused, non edematous        LABS:                        10.8   9.40  )-----------( 270      ( 24 Jun 2019 07:46 )             32.8     06-23    137  |  105  |  12  ----------------------------<  115<H>  4.3   |  20<L>  |  0.50    Ca    8.3<L>      23 Jun 2019 07:35  Phos  2.9     06-23  Mg     2.2     06-23    TPro  6.5  /  Alb  3.3  /  TBili  0.2  /  DBili  x   /  AST  59<H>  /  ALT  62<H>  /  AlkPhos  54  06-23    LIVER FUNCTIONS - ( 23 Jun 2019 07:35 )  Alb: 3.3 g/dL / Pro: 6.5 g/dL / ALK PHOS: 54 U/L / ALT: 62 U/L / AST: 59 U/L / GGT: x                   MEDICATIONS  (STANDING):  acetaminophen   Tablet .. 975 milliGRAM(s) Oral every 6 hours  BUpivacaine liposome 1.3% Injectable (no eMAR) 20 milliLiter(s) Local Injection once  dextrose 5%. 1000 milliLiter(s) (50 mL/Hr) IV Continuous <Continuous>  dextrose 50% Injectable 12.5 Gram(s) IV Push once  dextrose 50% Injectable 25 Gram(s) IV Push once  dextrose 50% Injectable 25 Gram(s) IV Push once  docusate sodium 100 milliGRAM(s) Oral two times a day  enoxaparin Injectable 40 milliGRAM(s) SubCutaneous daily  ibuprofen  Tablet. 600 milliGRAM(s) Oral every 6 hours  insulin lispro (HumaLOG) corrective regimen sliding scale   SubCutaneous Before meals and at bedtime  lisinopril 20 milliGRAM(s) Oral daily  oxybutynin 5 milliGRAM(s) Oral three times a day  pantoprazole  Injectable 40 milliGRAM(s) IV Push daily  senna 1 Tablet(s) Oral daily  trimethoprim  160 mG/sulfamethoxazole 800 mG 1 Tablet(s) Oral every 12 hours    MEDICATIONS  (PRN):  dextrose 40% Gel 15 Gram(s) Oral once PRN Blood Glucose LESS THAN 70 milliGRAM(s)/deciliter  glucagon  Injectable 1 milliGRAM(s) IntraMuscular once PRN Glucose LESS THAN 70 milligrams/deciliter  metoclopramide Injectable 10 milliGRAM(s) IV Push every 4 hours PRN nausea  naloxone Injectable 0.1 milliGRAM(s) IV Push every 3 minutes PRN For ANY of the following changes in patient status:  A. RR LESS THAN 10 breaths per minute, B. Oxygen saturation LESS THAN 90%, C. Sedation score of 6  ondansetron Injectable 4 milliGRAM(s) IV Push every 6 hours PRN Nausea  oxyCODONE    IR 5 milliGRAM(s) Oral every 4 hours PRN Moderate Pain (4 - 6)  oxyCODONE    IR 10 milliGRAM(s) Oral every 6 hours PRN Severe Pain (7 - 10)      RADIOLOGY & ADDITIONAL STUDIES:
Interval Hx:  MARI TO, worked with PT yesterday    SUBJECTIVE:   Patient seen and examined by bedside. Continues to have moderate incisional pain, but well-controlled on pain meds. No issues working with PT yesterday. Is now + flatus, but still no BM. Denies nausea / vomiting. Tolerating sips but reports not a lot of PO intake so far. Denies fevers, chills or rigors. No other complaints.       Vitals - Range in last 12h  T(F): , Max: 98.6 (06-21-19 @ 09:14)  HR:  (98 - 102)  BP:  (134/82 - 165/93)  BP(mean): --  ABP: --  ABP(mean): --  RR:  (16 - 18)  SpO2:  (98% - 100%)  CVP(mm Hg): --  CVP(cm H2O): --  CO: --  CI: --  PA: --  PA(mean): --  PA(direct): --  PCWP: --    Vitals - Most Recent  T(F): 98.6 (06-21-19 @ 09:14)  HR: 102 (06-21-19 @ 09:14)  BP: 143/88 (06-21-19 @ 09:14)  RR: 17 (06-21-19 @ 09:14)  SpO2: 100% (06-21-19 @ 09:14)  I&O's Detail    20 Jun 2019 07:01  -  21 Jun 2019 07:00  --------------------------------------------------------  IN:    Oral Fluid: 100 mL    sodium chloride 0.9%: 750 mL  Total IN: 850 mL    OUT:    Bulb: 200 mL    Emesis: 25 mL    Indwelling Catheter - Urethral: 2700 mL  Total OUT: 2925 mL    Total NET: -2075 mL      21 Jun 2019 07:01  -  21 Jun 2019 11:45  --------------------------------------------------------  IN:  Total IN: 0 mL    OUT:    Bulb: 30 mL    Indwelling Catheter - Urethral: 500 mL  Total OUT: 530 mL    Total NET: -530 mL            Physical Exam    Gen:  NAD, resting comfortably  HEENT: no jvd, mmm  Pulm:  no respiratory distress, nonlabored breathing  C/V: tachycardic to 110, rrr,   Abd: soft, NT/ND, midline dressing in place, cdi, drain w/ serosanguinous output  : perez in place w/ clear yellow urine output, 1700 cc overnight  Extrem: warm and well-perfused, non edematous      LABS:                        10.3   10.75 )-----------( 172      ( 21 Jun 2019 07:16 )             31.1     06-21    137  |  102  |  11  ----------------------------<  125<H>  3.8   |  22  |  0.63    Ca    8.2<L>      21 Jun 2019 07:16  Phos  3.8     06-21  Mg     2.2     06-21    TPro  6.0  /  Alb  3.2<L>  /  TBili  0.2  /  DBili  x   /  AST  27  /  ALT  20  /  AlkPhos  43  06-20    LIVER FUNCTIONS - ( 20 Jun 2019 07:53 )  Alb: 3.2 g/dL / Pro: 6.0 g/dL / ALK PHOS: 43 U/L / ALT: 20 U/L / AST: 27 U/L / GGT: x                   MEDICATIONS  (STANDING):  acetaminophen   Tablet .. 975 milliGRAM(s) Oral every 6 hours  BUpivacaine liposome 1.3% Injectable (no eMAR) 20 milliLiter(s) Local Injection once  dextrose 5%. 1000 milliLiter(s) (50 mL/Hr) IV Continuous <Continuous>  dextrose 50% Injectable 12.5 Gram(s) IV Push once  dextrose 50% Injectable 25 Gram(s) IV Push once  dextrose 50% Injectable 25 Gram(s) IV Push once  docusate sodium 100 milliGRAM(s) Oral two times a day  enoxaparin Injectable 40 milliGRAM(s) SubCutaneous daily  ibuprofen  Tablet. 600 milliGRAM(s) Oral every 6 hours  insulin lispro (HumaLOG) corrective regimen sliding scale   SubCutaneous Before meals and at bedtime  lisinopril 10 milliGRAM(s) Oral daily  oxybutynin 5 milliGRAM(s) Oral three times a day  potassium phosphate / sodium phosphate powder 1 Packet(s) Oral two times a day  trimethoprim  160 mG/sulfamethoxazole 800 mG 1 Tablet(s) Oral every 12 hours    MEDICATIONS  (PRN):  dextrose 40% Gel 15 Gram(s) Oral once PRN Blood Glucose LESS THAN 70 milliGRAM(s)/deciliter  glucagon  Injectable 1 milliGRAM(s) IntraMuscular once PRN Glucose LESS THAN 70 milligrams/deciliter  metoclopramide Injectable 10 milliGRAM(s) IV Push every 4 hours PRN nausea  naloxone Injectable 0.1 milliGRAM(s) IV Push every 3 minutes PRN For ANY of the following changes in patient status:  A. RR LESS THAN 10 breaths per minute, B. Oxygen saturation LESS THAN 90%, C. Sedation score of 6  ondansetron Injectable 4 milliGRAM(s) IV Push every 6 hours PRN Nausea  oxyCODONE    IR 5 milliGRAM(s) Oral every 4 hours PRN Moderate Pain (4 - 6)  oxyCODONE    IR 10 milliGRAM(s) Oral every 6 hours PRN Severe Pain (7 - 10)      RADIOLOGY & ADDITIONAL STUDIES:
Interval Hx:  Tachycardic to 100-110 last night. Complained of reproducible substernal chest pain, resolved with pain medication.    SUBJECTIVE:   Patient seen and examined by bedside. Says chest pain improved from last night, no shortness of breath, dyspnea, or hemoptysis. She reports mild shortness of breath when ambulating yesterday. No calf pain or new lower extremity swelling    Otherwise tolerating regular diet. Ate salad without nausea or emesis and passing flatus and 2 bms in last 24 hours including this morning. BM's loose, but not diarrhea, no hematochezia or melena.      Vitals - Range in last 12h  T(F): , Max: 98.9 (06-24-19 @ 21:59)  HR:  (91 - 107)  BP:  (125/83 - 133/85)  BP(mean): --  ABP: --  ABP(mean): --  RR:  (17 - 19)  SpO2:  (100% - 100%)  CVP(mm Hg): --  CVP(cm H2O): --  CO: --  CI: --  PA: --  PA(mean): --  PA(direct): --  PCWP: --    Vitals - Most Recent  T(F): 98.3 (06-25-19 @ 04:55)  HR: 102 (06-25-19 @ 04:55)  BP: 125/83 (06-25-19 @ 04:55)  RR: 19 (06-25-19 @ 04:55)  SpO2: 100% (06-25-19 @ 04:55)  I&O's Detail    24 Jun 2019 07:01  -  25 Jun 2019 07:00  --------------------------------------------------------  IN:    Oral Fluid: 940 mL  Total IN: 940 mL    OUT:    Bulb: 180 mL    Indwelling Catheter - Urethral: 1350 mL  Total OUT: 1530 mL    Total NET: -590 mL            Physical Exam  Gen:  NAD, resting comfortably  HEENT: no jvd, mmm  Pulm:  no respiratory distress, nonlabored breathing, Clear to auscultation bilaterally   Chest: mild tenderness to palpation along sternum  C/V: sinus tachycardia to 100, rrr, s1s2, no murmurs  Abd: soft, NT/ND, midline dressing in place, cdi, drain w/ serosanguinous output  : perez in place w/ clear yellow urine output  Extrem: warm and well-perfused, non edematous        LABS:                        11.8   10.34 )-----------( 352      ( 25 Jun 2019 06:52 )             36.0     06-24    137  |  103  |  12  ----------------------------<  115<H>  4.1   |  20<L>  |  0.52    Ca    8.5      24 Jun 2019 07:46  Phos  2.5     06-24  Mg     2.0     06-24    TPro  6.5  /  Alb  3.3  /  TBili  0.2  /  DBili  x   /  AST  59<H>  /  ALT  62<H>  /  AlkPhos  54  06-23    LIVER FUNCTIONS - ( 23 Jun 2019 07:35 )  Alb: 3.3 g/dL / Pro: 6.5 g/dL / ALK PHOS: 54 U/L / ALT: 62 U/L / AST: 59 U/L / GGT: x                 Culture - Urine (collected 23 Jun 2019 21:45)  Source: .Urine Catheterized  Preliminary Report (24 Jun 2019 08:35):    No growth to date        MEDICATIONS  (STANDING):  acetaminophen   Tablet .. 975 milliGRAM(s) Oral every 6 hours  BUpivacaine liposome 1.3% Injectable (no eMAR) 20 milliLiter(s) Local Injection once  dextrose 5%. 1000 milliLiter(s) (50 mL/Hr) IV Continuous <Continuous>  dextrose 50% Injectable 12.5 Gram(s) IV Push once  dextrose 50% Injectable 25 Gram(s) IV Push once  dextrose 50% Injectable 25 Gram(s) IV Push once  docusate sodium 100 milliGRAM(s) Oral two times a day  enoxaparin Injectable 40 milliGRAM(s) SubCutaneous daily  famotidine  IVPB 20 milliGRAM(s) IV Intermittent once  ibuprofen  Tablet. 600 milliGRAM(s) Oral every 6 hours  insulin lispro (HumaLOG) corrective regimen sliding scale   SubCutaneous Before meals and at bedtime  lisinopril 20 milliGRAM(s) Oral daily  oxybutynin 5 milliGRAM(s) Oral three times a day  pantoprazole  Injectable 40 milliGRAM(s) IV Push daily  senna 1 Tablet(s) Oral daily  trimethoprim  160 mG/sulfamethoxazole 800 mG 1 Tablet(s) Oral every 12 hours    MEDICATIONS  (PRN):  benzocaine 15 mG/menthol 3.6 mG Lozenge 1 Lozenge Oral three times a day PRN Sore Throat  dextrose 40% Gel 15 Gram(s) Oral once PRN Blood Glucose LESS THAN 70 milliGRAM(s)/deciliter  glucagon  Injectable 1 milliGRAM(s) IntraMuscular once PRN Glucose LESS THAN 70 milligrams/deciliter  metoclopramide Injectable 10 milliGRAM(s) IV Push every 4 hours PRN nausea  naloxone Injectable 0.1 milliGRAM(s) IV Push every 3 minutes PRN For ANY of the following changes in patient status:  A. RR LESS THAN 10 breaths per minute, B. Oxygen saturation LESS THAN 90%, C. Sedation score of 6  ondansetron Injectable 4 milliGRAM(s) IV Push every 6 hours PRN Nausea  oxyCODONE    IR 5 milliGRAM(s) Oral every 4 hours PRN Moderate Pain (4 - 6)  oxyCODONE    IR 10 milliGRAM(s) Oral every 6 hours PRN Severe Pain (7 - 10)      RADIOLOGY & ADDITIONAL STUDIES:
Medicine Consult Progress Note  Subjective: Patient reports that her abdominal pain is improving. No fevers, chills, chest pain, SOB.     VITALS  Vital Signs Last 24 Hrs  T(C): 36.2 (21 Jun 2019 12:51), Max: 37.1 (20 Jun 2019 21:16)  T(F): 97.1 (21 Jun 2019 12:51), Max: 98.8 (20 Jun 2019 21:16)  HR: 90 (21 Jun 2019 12:51) (86 - 116)  BP: 134/93 (21 Jun 2019 12:51) (134/82 - 165/93)  BP(mean): --  RR: 17 (21 Jun 2019 12:51) (16 - 18)  SpO2: 100% (21 Jun 2019 12:51) (98% - 100%)    I&O's Summary    20 Jun 2019 07:01  -  21 Jun 2019 07:00  --------------------------------------------------------  IN: 850 mL / OUT: 2925 mL / NET: -2075 mL    21 Jun 2019 07:01  -  21 Jun 2019 13:06  --------------------------------------------------------  IN: 0 mL / OUT: 900 mL / NET: -900 mL        CAPILLARY BLOOD GLUCOSE      POCT Blood Glucose.: 139 mg/dL (21 Jun 2019 11:50)  POCT Blood Glucose.: 125 mg/dL (21 Jun 2019 06:40)  POCT Blood Glucose.: 118 mg/dL (20 Jun 2019 22:07)  POCT Blood Glucose.: 130 mg/dL (20 Jun 2019 17:21)      PHYSICAL EXAM  Constitutional: WDWN resting comfortably in bed; NAD anicteric sclera  ENT: no nasal discharge; uvula midline, no oropharyngeal erythema or exudates; MMM  Neck: supple; no JVD  Respiratory: CTA B/L; no W/R/R, no retractions  Cardiac: +S1/S2; RRR; no M/R/G; PMI non-displaced  Gastrointestinal: abdomen soft, NT/ND; no rebound or guarding; +BSx4; midline incision with staples, no surrounding erythema, no drainage, CRISS drain in place with serosanguinous drainage  Back: spine midline, no bony tenderness or step-offs; no CVAT B/L  Extremities: WWP, no clubbing or cyanosis; no peripheral edema  Neurologic: AAOx3; CNII-XII grossly intact; no focal deficits    MEDICATIONS  (STANDING):  acetaminophen   Tablet .. 975 milliGRAM(s) Oral every 6 hours  BUpivacaine liposome 1.3% Injectable (no eMAR) 20 milliLiter(s) Local Injection once  dextrose 5%. 1000 milliLiter(s) (50 mL/Hr) IV Continuous <Continuous>  dextrose 50% Injectable 12.5 Gram(s) IV Push once  dextrose 50% Injectable 25 Gram(s) IV Push once  dextrose 50% Injectable 25 Gram(s) IV Push once  docusate sodium 100 milliGRAM(s) Oral two times a day  enoxaparin Injectable 40 milliGRAM(s) SubCutaneous daily  ibuprofen  Tablet. 600 milliGRAM(s) Oral every 6 hours  insulin lispro (HumaLOG) corrective regimen sliding scale   SubCutaneous Before meals and at bedtime  lisinopril 10 milliGRAM(s) Oral daily  oxybutynin 5 milliGRAM(s) Oral three times a day  potassium phosphate / sodium phosphate powder 1 Packet(s) Oral two times a day  trimethoprim  160 mG/sulfamethoxazole 800 mG 1 Tablet(s) Oral every 12 hours    MEDICATIONS  (PRN):  dextrose 40% Gel 15 Gram(s) Oral once PRN Blood Glucose LESS THAN 70 milliGRAM(s)/deciliter  glucagon  Injectable 1 milliGRAM(s) IntraMuscular once PRN Glucose LESS THAN 70 milligrams/deciliter  metoclopramide Injectable 10 milliGRAM(s) IV Push every 4 hours PRN nausea  naloxone Injectable 0.1 milliGRAM(s) IV Push every 3 minutes PRN For ANY of the following changes in patient status:  A. RR LESS THAN 10 breaths per minute, B. Oxygen saturation LESS THAN 90%, C. Sedation score of 6  ondansetron Injectable 4 milliGRAM(s) IV Push every 6 hours PRN Nausea  oxyCODONE    IR 5 milliGRAM(s) Oral every 4 hours PRN Moderate Pain (4 - 6)  oxyCODONE    IR 10 milliGRAM(s) Oral every 6 hours PRN Severe Pain (7 - 10)      LABS                        10.3   10.75 )-----------( 172      ( 21 Jun 2019 07:16 )             31.1     06-21    137  |  102  |  11  ----------------------------<  125<H>  3.8   |  22  |  0.63    Ca    8.2<L>      21 Jun 2019 07:16  Phos  3.8     06-21  Mg     2.2     06-21    TPro  6.0  /  Alb  3.2<L>  /  TBili  0.2  /  DBili  x   /  AST  27  /  ALT  20  /  AlkPhos  43  06-20    LIVER FUNCTIONS - ( 20 Jun 2019 07:53 )  Alb: 3.2 g/dL / Pro: 6.0 g/dL / ALK PHOS: 43 U/L / ALT: 20 U/L / AST: 27 U/L / GGT: x
Medicine Consult Progress Note  Subjective: Patient reports that her abdominal pain is much better. Had a BM yesterday. Ate oatmeal for breakfast without any pain or nausea. Received a Reiki treatment from the wellness nurse this morning.     VITALS  Vital Signs Last 24 Hrs  T(C): 36.9 (24 Jun 2019 08:42), Max: 37.1 (23 Jun 2019 21:06)  T(F): 98.5 (24 Jun 2019 08:42), Max: 98.8 (23 Jun 2019 21:06)  HR: 96 (24 Jun 2019 08:42) (96 - 110)  BP: 121/99 (24 Jun 2019 08:42) (119/85 - 154/86)  BP(mean): --  RR: 17 (24 Jun 2019 08:42) (16 - 18)  SpO2: 100% (24 Jun 2019 08:42) (100% - 100%)    I&O's Summary    23 Jun 2019 07:01  -  24 Jun 2019 07:00  --------------------------------------------------------  IN: 180 mL / OUT: 1663 mL / NET: -1483 mL    24 Jun 2019 07:01  -  24 Jun 2019 13:48  --------------------------------------------------------  IN: 120 mL / OUT: 400 mL / NET: -280 mL        CAPILLARY BLOOD GLUCOSE      POCT Blood Glucose.: 108 mg/dL (24 Jun 2019 12:01)  POCT Blood Glucose.: 114 mg/dL (24 Jun 2019 06:59)  POCT Blood Glucose.: 111 mg/dL (23 Jun 2019 21:12)  POCT Blood Glucose.: 99 mg/dL (23 Jun 2019 17:21)      PHYSICAL EXAM  Constitutional: WDWN resting comfortably in bed; NAD anicteric sclera  ENT: no nasal discharge; uvula midline, no oropharyngeal erythema or exudates; MMM  Neck: supple; no JVD  Respiratory: CTA B/L; no W/R/R, no retractions  Cardiac: +S1/S2; RRR; no M/R/G; PMI non-displaced  Gastrointestinal: abdomen soft, NT/ND; no rebound or guarding; +BSx4; midline incision with staples, no surrounding erythema, no drainage, CRISS drain in place with serosanguinous drainage  Extremities: WWP, no clubbing or cyanosis; no peripheral edema  Neurologic: AAOx3; CNII-XII grossly intact; no focal deficits    MEDICATIONS  (STANDING):  acetaminophen   Tablet .. 975 milliGRAM(s) Oral every 6 hours  BUpivacaine liposome 1.3% Injectable (no eMAR) 20 milliLiter(s) Local Injection once  dextrose 5%. 1000 milliLiter(s) (50 mL/Hr) IV Continuous <Continuous>  dextrose 50% Injectable 12.5 Gram(s) IV Push once  dextrose 50% Injectable 25 Gram(s) IV Push once  dextrose 50% Injectable 25 Gram(s) IV Push once  docusate sodium 100 milliGRAM(s) Oral two times a day  enoxaparin Injectable 40 milliGRAM(s) SubCutaneous daily  ibuprofen  Tablet. 600 milliGRAM(s) Oral every 6 hours  insulin lispro (HumaLOG) corrective regimen sliding scale   SubCutaneous Before meals and at bedtime  lisinopril 20 milliGRAM(s) Oral daily  oxybutynin 5 milliGRAM(s) Oral three times a day  pantoprazole  Injectable 40 milliGRAM(s) IV Push daily  senna 1 Tablet(s) Oral daily  trimethoprim  160 mG/sulfamethoxazole 800 mG 1 Tablet(s) Oral every 12 hours    MEDICATIONS  (PRN):  dextrose 40% Gel 15 Gram(s) Oral once PRN Blood Glucose LESS THAN 70 milliGRAM(s)/deciliter  glucagon  Injectable 1 milliGRAM(s) IntraMuscular once PRN Glucose LESS THAN 70 milligrams/deciliter  metoclopramide Injectable 10 milliGRAM(s) IV Push every 4 hours PRN nausea  naloxone Injectable 0.1 milliGRAM(s) IV Push every 3 minutes PRN For ANY of the following changes in patient status:  A. RR LESS THAN 10 breaths per minute, B. Oxygen saturation LESS THAN 90%, C. Sedation score of 6  ondansetron Injectable 4 milliGRAM(s) IV Push every 6 hours PRN Nausea  oxyCODONE    IR 5 milliGRAM(s) Oral every 4 hours PRN Moderate Pain (4 - 6)  oxyCODONE    IR 10 milliGRAM(s) Oral every 6 hours PRN Severe Pain (7 - 10)      LABS                        10.8   9.40  )-----------( 270      ( 24 Jun 2019 07:46 )             32.8     06-24    137  |  103  |  12  ----------------------------<  115<H>  4.1   |  20<L>  |  0.52    Ca    8.5      24 Jun 2019 07:46  Phos  2.5     06-24  Mg     2.0     06-24    TPro  6.5  /  Alb  3.3  /  TBili  0.2  /  DBili  x   /  AST  59<H>  /  ALT  62<H>  /  AlkPhos  54  06-23    LIVER FUNCTIONS - ( 23 Jun 2019 07:35 )  Alb: 3.3 g/dL / Pro: 6.5 g/dL / ALK PHOS: 54 U/L / ALT: 62 U/L / AST: 59 U/L / GGT: x
Medicine Consult Progress Note  Subjective: Patient still on clear liquid diet, still with abdominal drain. Abdominal pain much improved. No nausea/vomiting, chest pain, SOB, cough.     VITALS  Vital Signs Last 24 Hrs  T(C): 36.6 (22 Jun 2019 05:26), Max: 37.2 (21 Jun 2019 21:08)  T(F): 97.8 (22 Jun 2019 05:26), Max: 98.9 (21 Jun 2019 21:08)  HR: 107 (22 Jun 2019 05:26) (86 - 107)  BP: 143/92 (22 Jun 2019 05:26) (128/87 - 143/92)  BP(mean): --  RR: 18 (22 Jun 2019 05:26) (17 - 18)  SpO2: 97% (22 Jun 2019 05:26) (97% - 100%)    I&O's Summary    21 Jun 2019 07:01  -  22 Jun 2019 07:00  --------------------------------------------------------  IN: 300 mL / OUT: 2325 mL / NET: -2025 mL        CAPILLARY BLOOD GLUCOSE      POCT Blood Glucose.: 139 mg/dL (22 Jun 2019 06:37)  POCT Blood Glucose.: 137 mg/dL (21 Jun 2019 21:38)  POCT Blood Glucose.: 136 mg/dL (21 Jun 2019 17:32)  POCT Blood Glucose.: 139 mg/dL (21 Jun 2019 11:50)      PHYSICAL EXAM  Constitutional: WDWN resting comfortably in bed; NAD anicteric sclera  ENT: no nasal discharge; uvula midline, no oropharyngeal erythema or exudates; MMM  Neck: supple; no JVD  Respiratory: CTA B/L; no W/R/R, no retractions  Cardiac: +S1/S2; RRR; no M/R/G; PMI non-displaced  Gastrointestinal: abdomen soft, NT/ND; no rebound or guarding; +BSx4; midline incision with staples, no surrounding erythema, no drainage, CRISS drain in place with serosanguinous drainage  Extremities: WWP, no clubbing or cyanosis; no peripheral edema  Neurologic: AAOx3; CNII-XII grossly intact; no focal deficits    MEDICATIONS  (STANDING):  acetaminophen   Tablet .. 975 milliGRAM(s) Oral every 6 hours  BUpivacaine liposome 1.3% Injectable (no eMAR) 20 milliLiter(s) Local Injection once  dextrose 5%. 1000 milliLiter(s) (50 mL/Hr) IV Continuous <Continuous>  dextrose 50% Injectable 12.5 Gram(s) IV Push once  dextrose 50% Injectable 25 Gram(s) IV Push once  dextrose 50% Injectable 25 Gram(s) IV Push once  docusate sodium 100 milliGRAM(s) Oral two times a day  enoxaparin Injectable 40 milliGRAM(s) SubCutaneous daily  ibuprofen  Tablet. 600 milliGRAM(s) Oral every 6 hours  insulin lispro (HumaLOG) corrective regimen sliding scale   SubCutaneous Before meals and at bedtime  lisinopril 20 milliGRAM(s) Oral daily  oxybutynin 5 milliGRAM(s) Oral three times a day  trimethoprim  160 mG/sulfamethoxazole 800 mG 1 Tablet(s) Oral every 12 hours    MEDICATIONS  (PRN):  dextrose 40% Gel 15 Gram(s) Oral once PRN Blood Glucose LESS THAN 70 milliGRAM(s)/deciliter  glucagon  Injectable 1 milliGRAM(s) IntraMuscular once PRN Glucose LESS THAN 70 milligrams/deciliter  metoclopramide Injectable 10 milliGRAM(s) IV Push every 4 hours PRN nausea  naloxone Injectable 0.1 milliGRAM(s) IV Push every 3 minutes PRN For ANY of the following changes in patient status:  A. RR LESS THAN 10 breaths per minute, B. Oxygen saturation LESS THAN 90%, C. Sedation score of 6  ondansetron Injectable 4 milliGRAM(s) IV Push every 6 hours PRN Nausea  oxyCODONE    IR 5 milliGRAM(s) Oral every 4 hours PRN Moderate Pain (4 - 6)  oxyCODONE    IR 10 milliGRAM(s) Oral every 6 hours PRN Severe Pain (7 - 10)      LABS                        10.6   9.65  )-----------( 214      ( 22 Jun 2019 06:04 )             33.0     06-22    136  |  100  |  12  ----------------------------<  129<H>  3.4<L>   |  23  |  0.71    Ca    8.4      22 Jun 2019 06:04  Phos  3.9     06-22  Mg     2.4     06-22
Medicine Consult Progress Note  Subjective: medicine called back for chest pain. Patient reports a pressure like pain in the mid-sternal area that started about 2 am. The pain did not radiate at first, but now radiates to her abdomen. With this pain, she could feel her heart pounding, and felt very nauseated. The pain is worse when she pushes on the spot. She also reports diarrhea x1 this morning. No fevers, chills, lightheadedness, vomiting, or leg swelling.     VITALS  Vital Signs Last 24 Hrs  T(C): 37.1 (25 Jun 2019 09:37), Max: 37.3 (24 Jun 2019 14:13)  T(F): 98.7 (25 Jun 2019 09:37), Max: 99.1 (24 Jun 2019 14:13)  HR: 109 (25 Jun 2019 09:37) (91 - 130)  BP: 125/81 (25 Jun 2019 09:37) (118/77 - 136/82)  BP(mean): --  RR: 18 (25 Jun 2019 09:37) (16 - 19)  SpO2: 100% (25 Jun 2019 09:37) (97% - 100%)    I&O's Summary    24 Jun 2019 07:01  -  25 Jun 2019 07:00  --------------------------------------------------------  IN: 940 mL / OUT: 1530 mL / NET: -590 mL        CAPILLARY BLOOD GLUCOSE      POCT Blood Glucose.: 145 mg/dL (25 Jun 2019 06:46)  POCT Blood Glucose.: 134 mg/dL (24 Jun 2019 21:55)  POCT Blood Glucose.: 114 mg/dL (24 Jun 2019 18:04)      PHYSICAL EXAM  Constitutional: WDWN resting comfortably in bed; NAD anicteric sclera  ENT: no nasal discharge; uvula midline, no oropharyngeal erythema or exudates; MMM  Neck: supple; no JVD  Respiratory: CTA B/L; no W/R/R, no retractions  Cardiac: +S1/S2; RRR; no M/R/G; PMI non-displaced, +TTP in lower sternal and epigastric region  Gastrointestinal: abdomen soft, NT/ND; no rebound or guarding; +BSx4; midline incision with staples, no surrounding erythema, no drainage, CRISS drain in place with serosanguinous drainage  Extremities: WWP, no clubbing or cyanosis; no peripheral edema  Neurologic: AAOx3; CNII-XII grossly intact; no focal deficits    MEDICATIONS  (STANDING):  acetaminophen   Tablet .. 975 milliGRAM(s) Oral every 6 hours  BUpivacaine liposome 1.3% Injectable (no eMAR) 20 milliLiter(s) Local Injection once  dextrose 5%. 1000 milliLiter(s) (50 mL/Hr) IV Continuous <Continuous>  dextrose 50% Injectable 12.5 Gram(s) IV Push once  dextrose 50% Injectable 25 Gram(s) IV Push once  dextrose 50% Injectable 25 Gram(s) IV Push once  docusate sodium 100 milliGRAM(s) Oral two times a day  enoxaparin Injectable 40 milliGRAM(s) SubCutaneous daily  ibuprofen  Tablet. 600 milliGRAM(s) Oral every 6 hours  insulin lispro (HumaLOG) corrective regimen sliding scale   SubCutaneous Before meals and at bedtime  lisinopril 20 milliGRAM(s) Oral daily  oxybutynin 5 milliGRAM(s) Oral three times a day  pantoprazole  Injectable 40 milliGRAM(s) IV Push daily  senna 1 Tablet(s) Oral daily  trimethoprim  160 mG/sulfamethoxazole 800 mG 1 Tablet(s) Oral every 12 hours    MEDICATIONS  (PRN):  benzocaine 15 mG/menthol 3.6 mG Lozenge 1 Lozenge Oral three times a day PRN Sore Throat  dextrose 40% Gel 15 Gram(s) Oral once PRN Blood Glucose LESS THAN 70 milliGRAM(s)/deciliter  glucagon  Injectable 1 milliGRAM(s) IntraMuscular once PRN Glucose LESS THAN 70 milligrams/deciliter  metoclopramide Injectable 10 milliGRAM(s) IV Push every 4 hours PRN nausea  naloxone Injectable 0.1 milliGRAM(s) IV Push every 3 minutes PRN For ANY of the following changes in patient status:  A. RR LESS THAN 10 breaths per minute, B. Oxygen saturation LESS THAN 90%, C. Sedation score of 6  ondansetron Injectable 4 milliGRAM(s) IV Push every 6 hours PRN Nausea  oxyCODONE    IR 5 milliGRAM(s) Oral every 4 hours PRN Moderate Pain (4 - 6)  oxyCODONE    IR 10 milliGRAM(s) Oral every 6 hours PRN Severe Pain (7 - 10)      LABS                        11.8   10.34 )-----------( 352      ( 25 Jun 2019 06:52 )             36.0     06-25    135  |  100  |  17  ----------------------------<  148<H>  4.4   |  21<L>  |  0.55    Ca    9.3      25 Jun 2019 06:52  Phos  2.5     06-24  Mg     2.0     06-24    TPro  7.1  /  Alb  3.8  /  TBili  0.2  /  DBili  x   /  AST  31  /  ALT  67<H>  /  AlkPhos  56  06-25    LIVER FUNCTIONS - ( 25 Jun 2019 06:52 )  Alb: 3.8 g/dL / Pro: 7.1 g/dL / ALK PHOS: 56 U/L / ALT: 67 U/L / AST: 31 U/L / GGT: x               CARDIAC MARKERS ( 25 Jun 2019 06:52 )  x     / <0.01 ng/mL / x     / x     / x
POST-OPERATIVE NOTE    Procedure: Extensive lysis of adhesions and SBR    Diagnosis/Indication: Vesicovaginal fistula     Surgeon: Dr. Hendrickson    S: Pt has no complaints. Denies CP, SOB, BRIGGS, calf tenderness. Pain controlled with medication. Denies nausea, vomiting.    O:  T(C): 37.4 (06-18-19 @ 17:25), Max: 37.4 (06-18-19 @ 17:25)  T(F): 99.4 (06-18-19 @ 17:25), Max: 99.4 (06-18-19 @ 17:25)  HR: 102 (06-18-19 @ 20:00) (88 - 102)  BP: 149/89 (06-18-19 @ 20:00) (110/69 - 155/86)  RR: 18 (06-18-19 @ 20:00) (16 - 25)  SpO2: 100% (06-18-19 @ 20:00) (100% - 100%)  Wt(kg): --                        11.4   20.27 )-----------( 208      ( 18 Jun 2019 17:45 )             36.1     06-18    135  |  102  |  10  ----------------------------<  258<H>  4.9   |  15<L>  |  0.64    Ca    7.8<L>      18 Jun 2019 17:45    TPro  5.7<L>  /  Alb  3.2<L>  /  TBili  0.3  /  DBili  x   /  AST  24  /  ALT  22  /  AlkPhos  52  06-18    Gen: NAD, resting comfortably in bed  C/V: NSR  Pulm: Nonlabored breathing, no respiratory distress  Abd: soft, NT/ND, pt has a midline incision that is covered with gauze and is clean, dry and intact  Extrem: WWP, no calf edema or tenderness, SCDs in place    A/P: 43y Female s/p above procedure  Diet: NPO  IVF: NS @ 125cc/hr as per primary team  Pain/nausea control  Sarkar  Lovenox/SCDs/OOBA/IS  Dispo pending pain control, PO tolerance, clinical improvement
Provider to bedside for patient evaluation secondary to patient having 1 episode of vomiting and persistently elevated blood pressure. Upon evaluation, nursing informed provider that patient was given Tylenol and Toradol IV. No further nausea. Patient sleeping comfortably with normal heart rate of 95. Patient's blood pressure have been persistently elevated - systolics mostly in 150-160s. Patient started on Lisinopril 10 mg at 19:00 6/20. As patient is naive to antihypertensives, will continue to monitor for now. If after 12 hours after initial dose of Lisinopril blood pressures remain elevated, will give additional Lisinopril as recommended by Medicine.
Pt seen and examined at bedside. Pt reports still having abdominal pain.  She says physical therapy had not come by earlier today.  She has not yet ambulated.  She has had sips of water.  Has perez in place.  Is passing flatus.    Pt denies fever, chills, chest pain, SOB, nausea, vomiting, lightheadedness, dizziness.      T(F): 97.6 (06-20-19 @ 17:00), Max: 98.9 (06-19-19 @ 20:46)  HR: 116 (06-20-19 @ 17:20) (86 - 116)  BP: 140/88 (06-20-19 @ 17:20) (140/88 - 168/99)  RR: 17 (06-20-19 @ 17:00) (16 - 20)  SpO2: 100% (06-20-19 @ 17:00) (97% - 100%)  Wt(kg): --  I&O's Summary    19 Jun 2019 07:01  -  20 Jun 2019 07:00  --------------------------------------------------------  IN: 3195.5 mL / OUT: 4790 mL / NET: -1594.5 mL    20 Jun 2019 07:01  -  20 Jun 2019 18:55  --------------------------------------------------------  IN: 750 mL / OUT: 1400 mL / NET: -650 mL        MEDICATIONS  (STANDING):  acetaminophen   Tablet .. 975 milliGRAM(s) Oral every 6 hours  BUpivacaine liposome 1.3% Injectable (no eMAR) 20 milliLiter(s) Local Injection once  dextrose 5%. 1000 milliLiter(s) (50 mL/Hr) IV Continuous <Continuous>  dextrose 50% Injectable 12.5 Gram(s) IV Push once  dextrose 50% Injectable 25 Gram(s) IV Push once  dextrose 50% Injectable 25 Gram(s) IV Push once  docusate sodium 100 milliGRAM(s) Oral two times a day  enoxaparin Injectable 40 milliGRAM(s) SubCutaneous daily  insulin lispro (HumaLOG) corrective regimen sliding scale   SubCutaneous Before meals and at bedtime  ketorolac   Injectable 30 milliGRAM(s) IV Push every 6 hours  lisinopril 10 milliGRAM(s) Oral daily  nitrofurantoin monohydrate/macrocrystals (MACROBID) 100 milliGRAM(s) Oral every 12 hours  oxybutynin 5 milliGRAM(s) Oral three times a day  potassium acid phosphate/sodium acid phosphate tablet (K-PHOS No. 2) 1 Tablet(s) Oral three times a day with meals    MEDICATIONS  (PRN):  dextrose 40% Gel 15 Gram(s) Oral once PRN Blood Glucose LESS THAN 70 milliGRAM(s)/deciliter  glucagon  Injectable 1 milliGRAM(s) IntraMuscular once PRN Glucose LESS THAN 70 milligrams/deciliter  metoclopramide Injectable 10 milliGRAM(s) IV Push every 4 hours PRN nausea  naloxone Injectable 0.1 milliGRAM(s) IV Push every 3 minutes PRN For ANY of the following changes in patient status:  A. RR LESS THAN 10 breaths per minute, B. Oxygen saturation LESS THAN 90%, C. Sedation score of 6  ondansetron Injectable 4 milliGRAM(s) IV Push every 6 hours PRN Nausea  oxyCODONE    IR 5 milliGRAM(s) Oral every 4 hours PRN Moderate Pain (4 - 6)  oxyCODONE    IR 10 milliGRAM(s) Oral every 6 hours PRN Severe Pain (7 - 10)      Physical Exam:  Constitutional: NAD  Pulmonary: regular work of breathing   Abdomen: incision site clean, dry, intact. Soft, mildly tender, moderately distended, no guarding, no rebound, normal bowel sounds; CRISS drain in place with serosanguinous output  Extremities: no lower extremity edema or calve tenderness. SCDs in place     LABS:                        9.7    12.69 )-----------( 144      ( 20 Jun 2019 07:53 )             29.3     06-20    140  |  106  |  7   ----------------------------<  144<H>  3.8   |  24  |  0.44<L>    Ca    8.1<L>      20 Jun 2019 07:53  Phos  2.1     06-20  Mg     2.2     06-20    TPro  6.0  /  Alb  3.2<L>  /  TBili  0.2  /  DBili  x   /  AST  27  /  ALT  20  /  AlkPhos  43  06-20
SUBJECTIVE:  Pt seen and examined by chief resident. Pt is doing well, resting comfortably on bed. Pain controlled. Diet tolerated. Ambulating out of bed. +F/-BM. No nausea or vomiting. No complaints at this time.      Vital Signs Last 24 Hrs  T(C): 37.1 (22 Jun 2019 16:50), Max: 37.2 (21 Jun 2019 21:08)  T(F): 98.8 (22 Jun 2019 16:50), Max: 98.9 (21 Jun 2019 21:08)  HR: 94 (22 Jun 2019 16:50) (67 - 107)  BP: 136/92 (22 Jun 2019 16:50) (128/87 - 143/92)  RR: 16 (22 Jun 2019 16:50) (15 - 18)  SpO2: 99% (22 Jun 2019 16:50) (97% - 100%)    Physical Exam:  General: NAD  Pulmonary: Nonlabored breathing, no respiratory distress  Abdominal: soft, appropriately tender, nondistended. Incison clean dry and intact. CRISS serousangionsous.   Extremities: warm, well perfused.       Lines/drains/tubes:    I&O's Summary    21 Jun 2019 07:01  -  22 Jun 2019 07:00  --------------------------------------------------------  IN: 300 mL / OUT: 2325 mL / NET: -2025 mL    22 Jun 2019 07:01  -  22 Jun 2019 18:57  --------------------------------------------------------  IN: 560 mL / OUT: 1060 mL / NET: -500 mL        LABS:                        10.6   9.65  )-----------( 214      ( 22 Jun 2019 06:04 )             33.0     06-22    136  |  100  |  12  ----------------------------<  129<H>  3.4<L>   |  23  |  0.71    Ca    8.4      22 Jun 2019 06:04  Phos  3.9     06-22  Mg     2.4     06-22          CAPILLARY BLOOD GLUCOSE  POCT Blood Glucose.: 142 mg/dL (22 Jun 2019 17:06)  POCT Blood Glucose.: 141 mg/dL (22 Jun 2019 12:04)  POCT Blood Glucose.: 139 mg/dL (22 Jun 2019 06:37)  POCT Blood Glucose.: 137 mg/dL (21 Jun 2019 21:38)
SUBJECTIVE: Patient seen and examined bedside. Patient reports that her pain is controlled. Patient is passing flatus and had a small bowel movement this morning. Patient is tolerating clear liquid diet and denies nausea and vomiting.    enoxaparin Injectable 40 milliGRAM(s) SubCutaneous daily  lisinopril 20 milliGRAM(s) Oral daily  trimethoprim  160 mG/sulfamethoxazole 800 mG 1 Tablet(s) Oral every 12 hours      Vital Signs Last 24 Hrs  T(C): 36.9 (23 Jun 2019 05:47), Max: 37.3 (22 Jun 2019 20:19)  T(F): 98.4 (23 Jun 2019 05:47), Max: 99.1 (22 Jun 2019 20:19)  HR: 103 (23 Jun 2019 09:00) (67 - 103)  BP: 138/91 (23 Jun 2019 09:00) (123/82 - 138/91)  BP(mean): --  RR: 16 (23 Jun 2019 09:00) (15 - 17)  SpO2: 100% (23 Jun 2019 09:00) (98% - 100%)  I&O's Detail    22 Jun 2019 07:01  -  23 Jun 2019 07:00  --------------------------------------------------------  IN:    Oral Fluid: 980 mL  Total IN: 980 mL    OUT:    Bulb: 202 mL    Indwelling Catheter - Urethral: 1625 mL  Total OUT: 1827 mL    Total NET: -847 mL          General: NAD, resting comfortably in bed  C/V: NSR  Pulm: Nonlabored breathing, no respiratory distress  Abd: soft, nondistended. mild tenderness to palpation around surgical incisions. CRISS x 1 ss  : perez in place.  Extrem: WWP, no edema, SCDs in place        LABS:                        10.9   7.93  )-----------( 225      ( 23 Jun 2019 07:35 )             33.6     06-23    137  |  105  |  12  ----------------------------<  115<H>  4.3   |  20<L>  |  0.50    Ca    8.3<L>      23 Jun 2019 07:35  Phos  2.9     06-23  Mg     2.2     06-23    TPro  6.5  /  Alb  3.3  /  TBili  0.2  /  DBili  x   /  AST  59<H>  /  ALT  62<H>  /  AlkPhos  54  06-23          RADIOLOGY & ADDITIONAL STUDIES:
GYN PROGRESS NOTE    Patient evaluated at the bedside. Overnight events significant for episode of reproducible chest pain which has since resolved. EKG performed at the time within normal limits.   Currently, patient denies SOB/dizziness/nausea/vomiting/abdominal pain/calf pain.   Pain well controlled on oral pain medications. Patient is ambulating independently, passing flatus and tolerating a regular diet. Patient feels that she continues to improve,     O:   T(C): 36.8 (06-25-19 @ 04:55), Max: 37.3 (06-24-19 @ 14:13)  HR: 102 (06-25-19 @ 04:55) (91 - 130)  BP: 125/83 (06-25-19 @ 04:55) (118/77 - 136/82)  RR: 19 (06-25-19 @ 04:55) (16 - 19)  SpO2: 100% (06-25-19 @ 04:55) (97% - 100%)  Wt(kg): --    GEN: patient appears well, resting comfortably, no distress   LUNGS: no respiratory distress, CTAB   ABD: soft, not distened, minimal tenderness, CRISS drain in place with serosangeous output   Pelvic: perez draining clear urine  EXT: no calf tenderness, SCDs in place         06-24 @ 07:01  -  06-25 @ 07:00  --------------------------------------------------------  IN: 940 mL / OUT: 1530 mL / NET: -590 mL
GYN Progress Note    Patient seen at bedside. Resting comfortably in bed. Pain moderately controlled.   She is tolerating clears but does not have much of an appetite. She is passing flatus. Sarkar remains in place.   Denies CP, palpitations, SOB, fever, chills, nausea, vomiting.    Vital Signs Last 24 Hrs  T(C): 36.2 (21 Jun 2019 12:51), Max: 37.1 (20 Jun 2019 21:16)  T(F): 97.1 (21 Jun 2019 12:51), Max: 98.8 (20 Jun 2019 21:16)  HR: 90 (21 Jun 2019 12:51) (90 - 103)  BP: 134/93 (21 Jun 2019 12:51) (134/82 - 165/93)  BP(mean): --  RR: 17 (21 Jun 2019 12:51) (16 - 18)  SpO2: 100% (21 Jun 2019 12:51) (98% - 100%)  I&O's Detail    20 Jun 2019 07:01  -  21 Jun 2019 07:00  --------------------------------------------------------  IN:    Oral Fluid: 100 mL    sodium chloride 0.9%: 750 mL  Total IN: 850 mL    OUT:    Bulb: 200 mL    Emesis: 25 mL    Indwelling Catheter - Urethral: 2700 mL  Total OUT: 2925 mL    Total NET: -2075 mL      21 Jun 2019 07:01  -  21 Jun 2019 19:07  --------------------------------------------------------  IN:  Total IN: 0 mL    OUT:    Bulb: 50 mL    Indwelling Catheter - Urethral: 1350 mL  Total OUT: 1400 mL    Total NET: -1400 mL        Physical Exam:  Gen: No Acute Distress, resting comfortable in bed  Pulm: Normal work of breathing, no wheezes/rhonchi/rales   GI: soft, appropriately tender, moderately distended, hypoactive BS, no rebound, no guarding, midline incision site clean/dry/intact   Ext: SCDs in place, no edema/erythema/tenderness                             10.3   10.75 )-----------( 172      ( 21 Jun 2019 07:16 )             31.1   06-21    137  |  102  |  11  ----------------------------<  125<H>  3.8   |  22  |  0.63    Ca    8.2<L>      21 Jun 2019 07:16  Phos  3.8     06-21  Mg     2.2     06-21    TPro  6.0  /  Alb  3.2<L>  /  TBili  0.2  /  DBili  x   /  AST  27  /  ALT  20  /  AlkPhos  43  06-20    MEDICATIONS  (STANDING):  acetaminophen   Tablet .. 975 milliGRAM(s) Oral every 6 hours  BUpivacaine liposome 1.3% Injectable (no eMAR) 20 milliLiter(s) Local Injection once  dextrose 5%. 1000 milliLiter(s) (50 mL/Hr) IV Continuous <Continuous>  dextrose 50% Injectable 12.5 Gram(s) IV Push once  dextrose 50% Injectable 25 Gram(s) IV Push once  dextrose 50% Injectable 25 Gram(s) IV Push once  docusate sodium 100 milliGRAM(s) Oral two times a day  enoxaparin Injectable 40 milliGRAM(s) SubCutaneous daily  ibuprofen  Tablet. 600 milliGRAM(s) Oral every 6 hours  insulin lispro (HumaLOG) corrective regimen sliding scale   SubCutaneous Before meals and at bedtime  oxybutynin 5 milliGRAM(s) Oral three times a day  potassium phosphate / sodium phosphate powder 1 Packet(s) Oral two times a day  trimethoprim  160 mG/sulfamethoxazole 800 mG 1 Tablet(s) Oral every 12 hours    MEDICATIONS  (PRN):  dextrose 40% Gel 15 Gram(s) Oral once PRN Blood Glucose LESS THAN 70 milliGRAM(s)/deciliter  glucagon  Injectable 1 milliGRAM(s) IntraMuscular once PRN Glucose LESS THAN 70 milligrams/deciliter  metoclopramide Injectable 10 milliGRAM(s) IV Push every 4 hours PRN nausea  naloxone Injectable 0.1 milliGRAM(s) IV Push every 3 minutes PRN For ANY of the following changes in patient status:  A. RR LESS THAN 10 breaths per minute, B. Oxygen saturation LESS THAN 90%, C. Sedation score of 6  ondansetron Injectable 4 milliGRAM(s) IV Push every 6 hours PRN Nausea  oxyCODONE    IR 5 milliGRAM(s) Oral every 4 hours PRN Moderate Pain (4 - 6)  oxyCODONE    IR 10 milliGRAM(s) Oral every 6 hours PRN Severe Pain (7 - 10)

## 2019-06-26 LAB — SURGICAL PATHOLOGY STUDY: SIGNIFICANT CHANGE UP

## 2019-06-27 PROBLEM — N82.0 VESICOVAGINAL FISTULA: Chronic | Status: ACTIVE | Noted: 2019-06-17

## 2019-06-27 RX ORDER — NITROFURANTOIN MACROCRYSTALS 100 MG/1
100 CAPSULE ORAL
Qty: 60 | Refills: 0 | Status: ACTIVE | COMMUNITY
Start: 2019-06-27 | End: 1900-01-01

## 2019-06-28 ENCOUNTER — APPOINTMENT (OUTPATIENT)
Dept: UROGYNECOLOGY | Facility: CLINIC | Age: 44
End: 2019-06-28
Payer: COMMERCIAL

## 2019-06-28 ENCOUNTER — LABORATORY RESULT (OUTPATIENT)
Age: 44
End: 2019-06-28

## 2019-06-28 PROCEDURE — 99024 POSTOP FOLLOW-UP VISIT: CPT

## 2019-06-28 RX ORDER — SOLIFENACIN SUCCINATE 10 MG/1
10 TABLET ORAL
Qty: 30 | Refills: 0 | Status: ACTIVE | COMMUNITY
Start: 2019-06-28 | End: 1900-01-01

## 2019-06-28 RX ORDER — NITROFURANTOIN MACROCRYSTALS 100 MG/1
100 CAPSULE ORAL
Qty: 60 | Refills: 0 | Status: ACTIVE | COMMUNITY
Start: 2019-06-28 | End: 1900-01-01

## 2019-06-28 NOTE — ASSESSMENT
[FreeTextEntry1] : Will switch to Vesicare and discontinue Detrol. Hydrocortisone cream for rash and [id number] mg BID prescribed. \par Dressing and perez changed today.\par F/U in 5 days.\par She was asked to contact me on my cell should she have any changes in her symptoms

## 2019-06-28 NOTE — SUBJECTIVE
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  She reports vaginal/urethral drainage after urgency.  Has sig urgency symtpms and ? urgency incontinence.  Wearing 2 diaper/day. Sarkar draining clear urine.  denies f/c, n/v [FreeTextEntry4] : She is having a dily BM

## 2019-06-28 NOTE — OBJECTIVE
[FreeTextEntry2] : CRISS in 24 hr period: 135 cc then 90 cc  (total 48 hrs) clear, drain intact and mobile [FreeTextEntry1] : left back with resolving rash likely from Bactrim (discontinued yesterday) [FreeTextEntry3] : sutures intact, no urine visible

## 2019-06-29 DIAGNOSIS — N82.0 VESICOVAGINAL FISTULA: ICD-10-CM

## 2019-06-29 DIAGNOSIS — R07.9 CHEST PAIN, UNSPECIFIED: ICD-10-CM

## 2019-06-29 DIAGNOSIS — D62 ACUTE POSTHEMORRHAGIC ANEMIA: ICD-10-CM

## 2019-06-29 DIAGNOSIS — E11.9 TYPE 2 DIABETES MELLITUS WITHOUT COMPLICATIONS: ICD-10-CM

## 2019-06-29 DIAGNOSIS — I10 ESSENTIAL (PRIMARY) HYPERTENSION: ICD-10-CM

## 2019-06-29 DIAGNOSIS — N73.6 FEMALE PELVIC PERITONEAL ADHESIONS (POSTINFECTIVE): ICD-10-CM

## 2019-06-29 DIAGNOSIS — R00.0 TACHYCARDIA, UNSPECIFIED: ICD-10-CM

## 2019-06-30 LAB — BACTERIA UR CULT: NORMAL

## 2019-07-03 ENCOUNTER — APPOINTMENT (OUTPATIENT)
Dept: UROGYNECOLOGY | Facility: CLINIC | Age: 44
End: 2019-07-03
Payer: COMMERCIAL

## 2019-07-03 PROCEDURE — 99024 POSTOP FOLLOW-UP VISIT: CPT

## 2019-07-03 NOTE — OBJECTIVE
[Good Support] : Good support [Healing well] : healing well [No Masses or Tenderness] : no masses or tenderness [Soft and Nontender] : soft and nontender [Clean, Dry, Intact] : Clean, Dry, Intact [FreeTextEntry1] : staples removed [FreeTextEntry3] : clear urine visible

## 2019-07-03 NOTE — SUBJECTIVE
[FreeTextEntry1] : Leaking urine per her vagina.  She is extremely depressed. Noticing more urgency UI.  CRISS drain 30 cc/day, clear

## 2019-07-07 LAB — BACTERIA UR CULT: ABNORMAL

## 2019-07-10 PROCEDURE — 82570 ASSAY OF URINE CREATININE: CPT

## 2019-07-10 PROCEDURE — 83735 ASSAY OF MAGNESIUM: CPT

## 2019-07-10 PROCEDURE — 85027 COMPLETE CBC AUTOMATED: CPT

## 2019-07-10 PROCEDURE — 85025 COMPLETE CBC W/AUTO DIFF WBC: CPT

## 2019-07-10 PROCEDURE — 85610 PROTHROMBIN TIME: CPT

## 2019-07-10 PROCEDURE — 97116 GAIT TRAINING THERAPY: CPT

## 2019-07-10 PROCEDURE — 80053 COMPREHEN METABOLIC PANEL: CPT

## 2019-07-10 PROCEDURE — 97161 PT EVAL LOW COMPLEX 20 MIN: CPT

## 2019-07-10 PROCEDURE — 83036 HEMOGLOBIN GLYCOSYLATED A1C: CPT

## 2019-07-10 PROCEDURE — 88341 IMHCHEM/IMCYTCHM EA ADD ANTB: CPT

## 2019-07-10 PROCEDURE — 82962 GLUCOSE BLOOD TEST: CPT

## 2019-07-10 PROCEDURE — 71275 CT ANGIOGRAPHY CHEST: CPT

## 2019-07-10 PROCEDURE — 88304 TISSUE EXAM BY PATHOLOGIST: CPT

## 2019-07-10 PROCEDURE — 80048 BASIC METABOLIC PNL TOTAL CA: CPT

## 2019-07-10 PROCEDURE — 84484 ASSAY OF TROPONIN QUANT: CPT

## 2019-07-10 PROCEDURE — 36430 TRANSFUSION BLD/BLD COMPNT: CPT

## 2019-07-10 PROCEDURE — 84100 ASSAY OF PHOSPHORUS: CPT

## 2019-07-10 PROCEDURE — 36415 COLL VENOUS BLD VENIPUNCTURE: CPT

## 2019-07-10 PROCEDURE — 86900 BLOOD TYPING SEROLOGIC ABO: CPT

## 2019-07-10 PROCEDURE — 86850 RBC ANTIBODY SCREEN: CPT

## 2019-07-10 PROCEDURE — C1758: CPT

## 2019-07-10 PROCEDURE — 87086 URINE CULTURE/COLONY COUNT: CPT

## 2019-07-10 PROCEDURE — 93005 ELECTROCARDIOGRAM TRACING: CPT

## 2019-07-10 PROCEDURE — 86923 COMPATIBILITY TEST ELECTRIC: CPT

## 2019-07-10 PROCEDURE — 86901 BLOOD TYPING SEROLOGIC RH(D): CPT

## 2019-07-10 PROCEDURE — P9016: CPT

## 2019-07-10 PROCEDURE — 85730 THROMBOPLASTIN TIME PARTIAL: CPT

## 2019-07-10 PROCEDURE — S2900: CPT

## 2019-07-10 PROCEDURE — 90686 IIV4 VACC NO PRSV 0.5 ML IM: CPT

## 2019-07-10 PROCEDURE — 88307 TISSUE EXAM BY PATHOLOGIST: CPT

## 2019-07-12 ENCOUNTER — APPOINTMENT (OUTPATIENT)
Dept: UROGYNECOLOGY | Facility: CLINIC | Age: 44
End: 2019-07-12
Payer: COMMERCIAL

## 2019-07-12 VITALS
DIASTOLIC BLOOD PRESSURE: 98 MMHG | OXYGEN SATURATION: 100 % | SYSTOLIC BLOOD PRESSURE: 140 MMHG | TEMPERATURE: 98.4 F | HEART RATE: 121 BPM

## 2019-07-12 DIAGNOSIS — Z96.0 PRESENCE OF UROGENITAL IMPLANTS: ICD-10-CM

## 2019-07-12 DIAGNOSIS — Z46.6 ENCOUNTER FOR FITTING AND ADJUSTMENT OF URINARY DEVICE: ICD-10-CM

## 2019-07-12 PROCEDURE — ZZZZZ: CPT

## 2019-07-12 RX ORDER — METFORMIN HYDROCHLORIDE 500 MG/1
500 TABLET, COATED ORAL
Qty: 30 | Refills: 0 | Status: ACTIVE | COMMUNITY
Start: 2019-06-24

## 2019-07-12 RX ORDER — POLYETHYLENE GLYCOL 3350 17 G/17G
17 POWDER, FOR SOLUTION ORAL
Qty: 238 | Refills: 0 | Status: ACTIVE | COMMUNITY
Start: 2019-06-24

## 2019-07-12 RX ORDER — NITROFURANTOIN (MONOHYDRATE/MACROCRYSTALS) 25; 75 MG/1; MG/1
100 CAPSULE ORAL
Qty: 10 | Refills: 0 | Status: ACTIVE | COMMUNITY
Start: 2019-07-12 | End: 1900-01-01

## 2019-07-12 RX ORDER — TOLTERODINE TARTRATE 4 MG/1
4 CAPSULE, EXTENDED RELEASE ORAL
Qty: 30 | Refills: 0 | Status: ACTIVE | COMMUNITY
Start: 2019-06-24

## 2019-07-12 NOTE — PROCEDURE
[Sarkar Cath Change] : change of an indwelling catheter (Sarkar) [Other ___] : [unfilled] [Balloon Size ___ cc] : and secured with a [unfilled] cc balloon [Indwelling Catheter] : an indwelling catheter [None] : there were no complications with the catheter insertion [Clear] : clear [No Complications] : no complications [Post procedure instructions and information given] : Post procedure instructions and information were given and reviewed with patient. [5] : 5 [Tolerated Well] : the patient tolerated the procedure well

## 2019-07-12 NOTE — PHYSICAL EXAM
[Flat] : flat [Mass (___ Cm)] : no ~M [unfilled] abdominal mass was palpated [Normal] : normal [Mid-line] : in the mid-line [de-identified] : edematous [Scar] : a scar was noted [de-identified] : edematous [FreeTextEntry4] : Suture material visible in anterior vagina, intact, slightly tender to palpation.

## 2019-07-12 NOTE — DISCUSSION/SUMMARY
[FreeTextEntry1] : We had a 16 Fr silicone catheter in stock, 14 Fr silicone removed.  Vulva, vagina and urethral meatus edematous.  Sarkar placed with no difficulty, patient reported some discomfort.  Placed to leg drainage.  Reviewed self care.  Will Rx Macrobid 1 PO BID X 5 days to cover her until she sees Dr Mancuso 7/17/19.

## 2019-07-12 NOTE — HISTORY OF PRESENT ILLNESS
[FreeTextEntry1] : This 42 yo woman presents with a history of 3 C/Sections followed by a FLORENTINO (midline incision) 11/2/18 at [facility] for large fibroid uterus. She had a Vesico-vaginal fistula repair and lysis of adhesions 3/6/19 with persistence of the fistula despite prolonged use of a perez catheter after surgery. \par On 6/18/19 she underwent (no op note available) a VVF repair through vertical abdominal incision.  She presents today 3 weeks + 2 days PO with C/O that her peerz is not draining and that she is spilling urine over the perez.  \par \par

## 2019-07-19 ENCOUNTER — APPOINTMENT (OUTPATIENT)
Dept: UROGYNECOLOGY | Facility: CLINIC | Age: 44
End: 2019-07-19
Payer: COMMERCIAL

## 2019-07-19 PROCEDURE — 99024 POSTOP FOLLOW-UP VISIT: CPT

## 2019-07-19 NOTE — SUBJECTIVE
[FreeTextEntry1] : She is here c/o vaginal and perez irritation.  Denies urine leakage from the vagina or urethra

## 2019-07-19 NOTE — ASSESSMENT
[FreeTextEntry1] : Catheter exchanged and will attempt methylene blue dye test in 2 wks.  She reports that she feels better\par Urine sent for cx.\par Prescribed silvadene for irrtation as hydrocortisone is not effective

## 2019-07-19 NOTE — OBJECTIVE
[Soft and Nontender] : soft and nontender [Clean, Dry, Intact] : Clean, Dry, Intact [FreeTextEntry3] : small amlunt of liquid dischargeth the vault

## 2019-07-21 LAB — BACTERIA UR CULT: NORMAL

## 2019-07-21 RX ORDER — SILVER SULFADIAZINE 10 MG/G
1 CREAM TOPICAL TWICE DAILY
Qty: 2 | Refills: 0 | Status: ACTIVE | COMMUNITY
Start: 2019-07-21 | End: 1900-01-01

## 2019-07-24 ENCOUNTER — APPOINTMENT (OUTPATIENT)
Dept: UROGYNECOLOGY | Facility: CLINIC | Age: 44
End: 2019-07-24
Payer: COMMERCIAL

## 2019-07-24 PROCEDURE — 99024 POSTOP FOLLOW-UP VISIT: CPT

## 2019-07-24 NOTE — ASSESSMENT
[FreeTextEntry1] : Catheter exchanged to 16 F.\par Advised the pt to discontinue shaving and use Silvadene and hydrocortisone cream.\par Will do bladder filling in 2 wks tor confirmation of VVF healing

## 2019-07-24 NOTE — SUBJECTIVE
[FreeTextEntry1] : The pt is here for vaginal irritation.  Her hx is sig for VVF repair on 6/18. reports urine leaking around the catheter (size 14 F)  Feels pelvic pressure sometimes.  Also, she shaves her labial area.

## 2019-07-25 ENCOUNTER — APPOINTMENT (OUTPATIENT)
Dept: UROGYNECOLOGY | Facility: CLINIC | Age: 44
End: 2019-07-25
Payer: COMMERCIAL

## 2019-07-25 PROCEDURE — 99024 POSTOP FOLLOW-UP VISIT: CPT

## 2019-07-25 PROCEDURE — 51702 INSERT TEMP BLADDER CATH: CPT | Mod: 78

## 2019-07-25 NOTE — HISTORY OF PRESENT ILLNESS
[FreeTextEntry1] : This 42 yo woman presents with a history of 3 C/Sections followed by a FLORENTINO (midline incision) 11/2/18 at [place] for large fibroid uterus. She had a Vesico-vaginal fistula repair and lysis of adhesions 3/6/19 with persistence of the fistula despite prolonged use of a perez catheter after surgery. \par On 6/18/19 she underwent (no op note available) a VVF repair through vertical abdominal incision. She presents today 5 weeks + 1 days PO after seeing Dr Gale yesterday when the perez was replaced and it slipped out today.  She presents for replacement of perez.\par

## 2019-07-25 NOTE — PROCEDURE
[Sarkar Cath Insertion] : insertion of an indwelling catheter (Sarkar) [Other ___] : [unfilled] [Patient] : the patient [Indwelling Catheter] : an indwelling catheter [Balloon Size ___ cc] : and secured with a [unfilled] cc balloon [___ Fr Straight Tip] : a [unfilled] in [place] straight tip catheter [None] : there were no complications with the catheter insertion [Clear] : clear [No Complications] : no complications [Tolerated Well] : the patient tolerated the procedure well [Post procedure instructions and information given] : Post procedure instructions and information were given and reviewed with patient. [2] : 2

## 2019-07-25 NOTE — DISCUSSION/SUMMARY
[FreeTextEntry1] : #14 Fr 10 cc balloon inserted with ease to BSD.  Patient will folloe up with Dr Gale as previously directed.  RTC PRN.

## 2019-08-07 ENCOUNTER — APPOINTMENT (OUTPATIENT)
Dept: UROGYNECOLOGY | Facility: CLINIC | Age: 44
End: 2019-08-07
Payer: COMMERCIAL

## 2019-08-07 PROCEDURE — 99024 POSTOP FOLLOW-UP VISIT: CPT

## 2019-08-07 NOTE — OBJECTIVE
[Soft and Nontender] : soft and nontender [FreeTextEntry3] : 1 cm apical VVF [Clean, Dry, Intact] : Clean, Dry, Intact

## 2019-08-07 NOTE — ASSESSMENT
[FreeTextEntry1] : Perez removed.  Will perform cystoscopy in 3 wks.  We discussed using menstral cup with a perez inside the cup to manage the leakage.  She will consider.\par Also, she is not a candidate for surgery for at least another 5 months.  I believe that she will need a tissue graft to have a chance for her bladder to heal as I was unable to suture the tissues together due to tearing.  Will try to find a plastic surgeon who may be able to assist.

## 2019-08-28 ENCOUNTER — APPOINTMENT (OUTPATIENT)
Dept: UROGYNECOLOGY | Facility: CLINIC | Age: 44
End: 2019-08-28
Payer: COMMERCIAL

## 2019-08-28 PROCEDURE — 52000 CYSTOURETHROSCOPY: CPT | Mod: 58

## 2019-10-30 ENCOUNTER — APPOINTMENT (OUTPATIENT)
Dept: UROGYNECOLOGY | Facility: CLINIC | Age: 44
End: 2019-10-30
Payer: COMMERCIAL

## 2019-10-30 PROCEDURE — 99213 OFFICE O/P EST LOW 20 MIN: CPT | Mod: 25

## 2019-10-30 PROCEDURE — 51700 IRRIGATION OF BLADDER: CPT

## 2019-10-30 NOTE — ASSESSMENT
[FreeTextEntry1] : Will proceed with EUA and bladder bx prior to the repair.\par A written consent was obtained

## 2019-10-30 NOTE — HISTORY OF PRESENT ILLNESS
[FreeTextEntry1] : The pt reports worsening leakage for the past 1 month especially with urgency.  She deneis continuous leakage or enuresis.  She does not wear pads at night.

## 2019-10-30 NOTE — PHYSICAL EXAM
[No Acute Distress] : in no acute distress [Well developed] : well developed [Well Nourished] : ~L well nourished [Good Hygeine] : demonstrates good hygeine [Normal] : was normal [Estrogen Effect] : estrogen effect was observed [FreeTextEntry4] : VVF at 2 site, 1 at the apex and 1 in the anterior vaginal wall during the filling

## 2019-11-04 ENCOUNTER — OTHER (OUTPATIENT)
Age: 44
End: 2019-11-04

## 2019-11-04 LAB — BACTERIA UR CULT: ABNORMAL

## 2019-11-04 RX ORDER — NITROFURANTOIN (MONOHYDRATE/MACROCRYSTALS) 25; 75 MG/1; MG/1
100 CAPSULE ORAL
Qty: 14 | Refills: 0 | Status: ACTIVE | COMMUNITY
Start: 2019-11-04 | End: 1900-01-01

## 2019-11-06 RX ORDER — NITROFURANTOIN (MONOHYDRATE/MACROCRYSTALS) 25; 75 MG/1; MG/1
100 CAPSULE ORAL
Qty: 14 | Refills: 0 | Status: ACTIVE | COMMUNITY
Start: 2019-11-06 | End: 1900-01-01

## 2019-11-11 ENCOUNTER — OTHER (OUTPATIENT)
Age: 44
End: 2019-11-11

## 2019-11-11 RX ORDER — NITROFURANTOIN (MONOHYDRATE/MACROCRYSTALS) 25; 75 MG/1; MG/1
100 CAPSULE ORAL
Qty: 14 | Refills: 0 | Status: ACTIVE | COMMUNITY
Start: 2019-11-11 | End: 1900-01-01

## 2019-11-18 ENCOUNTER — OUTPATIENT (OUTPATIENT)
Dept: OUTPATIENT SERVICES | Facility: HOSPITAL | Age: 44
LOS: 1 days | Discharge: HOME | End: 2019-11-18

## 2019-11-18 ENCOUNTER — RESULT REVIEW (OUTPATIENT)
Age: 44
End: 2019-11-18

## 2019-11-18 ENCOUNTER — APPOINTMENT (OUTPATIENT)
Dept: UROGYNECOLOGY | Facility: AMBULATORY SURGERY CENTER | Age: 44
End: 2019-11-18
Payer: COMMERCIAL

## 2019-11-18 DIAGNOSIS — Z98.891 HISTORY OF UTERINE SCAR FROM PREVIOUS SURGERY: Chronic | ICD-10-CM

## 2019-11-18 DIAGNOSIS — Z90.710 ACQUIRED ABSENCE OF BOTH CERVIX AND UTERUS: Chronic | ICD-10-CM

## 2019-11-18 PROCEDURE — 52204 CYSTOSCOPY W/BIOPSY(S): CPT

## 2019-11-18 PROCEDURE — 57320 REPAIR BLADDER-VAGINA LESION: CPT

## 2019-11-19 RX ORDER — OXYCODONE AND ACETAMINOPHEN 5; 325 MG/1; MG/1
5-325 TABLET ORAL
Qty: 5 | Refills: 0 | Status: ACTIVE | COMMUNITY
Start: 2019-11-19 | End: 1900-01-01

## 2019-11-20 LAB — SURGICAL PATHOLOGY STUDY: SIGNIFICANT CHANGE UP

## 2019-12-04 ENCOUNTER — APPOINTMENT (OUTPATIENT)
Dept: UROGYNECOLOGY | Facility: CLINIC | Age: 44
End: 2019-12-04

## 2020-01-08 ENCOUNTER — APPOINTMENT (OUTPATIENT)
Dept: UROGYNECOLOGY | Facility: CLINIC | Age: 45
End: 2020-01-08
Payer: COMMERCIAL

## 2020-01-08 PROCEDURE — 99215 OFFICE O/P EST HI 40 MIN: CPT | Mod: 25

## 2020-01-08 PROCEDURE — 51700 IRRIGATION OF BLADDER: CPT

## 2020-01-08 NOTE — ASSESSMENT
[FreeTextEntry1] : She is ready to proceed with surgery.  She will be scheduled for vaginal VVF surgery either next week or at the end of Feb.

## 2020-01-08 NOTE — PHYSICAL EXAM
[No Acute Distress] : in no acute distress [Well developed] : well developed [FreeTextEntry4] : 1 VVF at the apex, 5 mm.  When the fistula was plugged with a perez bulb, no leakage was noted

## 2020-01-08 NOTE — HISTORY OF PRESENT ILLNESS
[FreeTextEntry1] : The pt is here for a f/u visit for VVF.\par She is now ready to proceed with VVF repair for recurrent fistula\par She reports that her UI is getting worse and is feeling pelvic pressure

## 2020-01-12 LAB — BACTERIA UR CULT: ABNORMAL

## 2020-01-12 RX ORDER — SULFAMETHOXAZOLE AND TRIMETHOPRIM 800; 160 MG/1; MG/1
800-160 TABLET ORAL TWICE DAILY
Qty: 10 | Refills: 0 | Status: ACTIVE | COMMUNITY
Start: 2020-01-12 | End: 1900-01-01

## 2020-02-05 ENCOUNTER — APPOINTMENT (OUTPATIENT)
Dept: UROGYNECOLOGY | Facility: CLINIC | Age: 45
End: 2020-02-05
Payer: COMMERCIAL

## 2020-02-05 PROCEDURE — 99215 OFFICE O/P EST HI 40 MIN: CPT

## 2020-02-05 NOTE — HISTORY OF PRESENT ILLNESS
[FreeTextEntry1] : The pt is here for a f/u visit for recurrent VVF.\par She is ready to proceed with vaginal repair.\par Her hx is sig for 1 failed repair and 1 recurrence.\par Her bx of her vag/bladder from VVF site from 11/2019 showed inflammation.\par She was then treated with antibiotics for cx proven UTI.\par

## 2020-02-05 NOTE — ASSESSMENT
[FreeTextEntry1] : She will be scheduled for a repeat VVF repair.   She is taught to perform SIC in case she has obstructed perez post-op.\par Her urine will be sent for cx next wk.

## 2020-02-08 RX ORDER — NITROFURANTOIN (MONOHYDRATE/MACROCRYSTALS) 25; 75 MG/1; MG/1
100 CAPSULE ORAL
Qty: 28 | Refills: 0 | Status: ACTIVE | COMMUNITY
Start: 2020-02-08 | End: 1900-01-01

## 2020-02-10 LAB — BACTERIA UR CULT: ABNORMAL

## 2020-02-22 RX ORDER — NITROFURANTOIN MACROCRYSTALS 100 MG/1
100 CAPSULE ORAL
Qty: 6 | Refills: 0 | Status: ACTIVE | COMMUNITY
Start: 2020-02-22 | End: 1900-01-01

## 2020-02-24 ENCOUNTER — TRANSCRIPTION ENCOUNTER (OUTPATIENT)
Age: 45
End: 2020-02-24

## 2020-02-25 ENCOUNTER — TRANSCRIPTION ENCOUNTER (OUTPATIENT)
Age: 45
End: 2020-02-25

## 2020-02-25 ENCOUNTER — INPATIENT (INPATIENT)
Facility: HOSPITAL | Age: 45
LOS: 0 days | Discharge: HOME | DRG: 989 | End: 2020-02-26
Attending: OBSTETRICS & GYNECOLOGY | Admitting: OBSTETRICS & GYNECOLOGY
Payer: COMMERCIAL

## 2020-02-25 ENCOUNTER — RESULT REVIEW (OUTPATIENT)
Age: 45
End: 2020-02-25

## 2020-02-25 ENCOUNTER — APPOINTMENT (OUTPATIENT)
Dept: UROGYNECOLOGY | Facility: HOSPITAL | Age: 45
End: 2020-02-25
Payer: COMMERCIAL

## 2020-02-25 VITALS
SYSTOLIC BLOOD PRESSURE: 139 MMHG | DIASTOLIC BLOOD PRESSURE: 89 MMHG | WEIGHT: 132.5 LBS | HEIGHT: 60 IN | RESPIRATION RATE: 16 BRPM | TEMPERATURE: 98 F | HEART RATE: 80 BPM | OXYGEN SATURATION: 99 %

## 2020-02-25 DIAGNOSIS — Z98.891 HISTORY OF UTERINE SCAR FROM PREVIOUS SURGERY: Chronic | ICD-10-CM

## 2020-02-25 DIAGNOSIS — Z90.710 ACQUIRED ABSENCE OF BOTH CERVIX AND UTERUS: Chronic | ICD-10-CM

## 2020-02-25 LAB
GLUCOSE BLDC GLUCOMTR-MCNC: 139 MG/DL — HIGH (ref 70–99)
GLUCOSE BLDC GLUCOMTR-MCNC: 163 MG/DL — HIGH (ref 70–99)
GLUCOSE BLDC GLUCOMTR-MCNC: 261 MG/DL — HIGH (ref 70–99)

## 2020-02-25 PROCEDURE — 88304 TISSUE EXAM BY PATHOLOGIST: CPT | Mod: 26

## 2020-02-25 PROCEDURE — 57320 REPAIR BLADDER-VAGINA LESION: CPT

## 2020-02-25 PROCEDURE — 52332 CYSTOSCOPY AND TREATMENT: CPT

## 2020-02-25 RX ORDER — OXYCODONE HYDROCHLORIDE 5 MG/1
5 TABLET ORAL EVERY 6 HOURS
Refills: 0 | Status: DISCONTINUED | OUTPATIENT
Start: 2020-02-25 | End: 2020-02-26

## 2020-02-25 RX ORDER — GLUCAGON INJECTION, SOLUTION 0.5 MG/.1ML
1 INJECTION, SOLUTION SUBCUTANEOUS ONCE
Refills: 0 | Status: DISCONTINUED | OUTPATIENT
Start: 2020-02-25 | End: 2020-02-26

## 2020-02-25 RX ORDER — ACETAMINOPHEN 500 MG
1000 TABLET ORAL EVERY 6 HOURS
Refills: 0 | Status: DISCONTINUED | OUTPATIENT
Start: 2020-02-25 | End: 2020-02-26

## 2020-02-25 RX ORDER — IBUPROFEN 200 MG
1 TABLET ORAL
Qty: 56 | Refills: 0
Start: 2020-02-25 | End: 2020-03-09

## 2020-02-25 RX ORDER — HYDROMORPHONE HYDROCHLORIDE 2 MG/ML
0.5 INJECTION INTRAMUSCULAR; INTRAVENOUS; SUBCUTANEOUS ONCE
Refills: 0 | Status: DISCONTINUED | OUTPATIENT
Start: 2020-02-25 | End: 2020-02-25

## 2020-02-25 RX ORDER — DEXTROSE 50 % IN WATER 50 %
25 SYRINGE (ML) INTRAVENOUS ONCE
Refills: 0 | Status: DISCONTINUED | OUTPATIENT
Start: 2020-02-25 | End: 2020-02-26

## 2020-02-25 RX ORDER — OXYBUTYNIN CHLORIDE 5 MG
5 TABLET ORAL EVERY 8 HOURS
Refills: 0 | Status: DISCONTINUED | OUTPATIENT
Start: 2020-02-25 | End: 2020-02-26

## 2020-02-25 RX ORDER — DEXTROSE 50 % IN WATER 50 %
15 SYRINGE (ML) INTRAVENOUS ONCE
Refills: 0 | Status: DISCONTINUED | OUTPATIENT
Start: 2020-02-25 | End: 2020-02-26

## 2020-02-25 RX ORDER — POLYETHYLENE GLYCOL 3350 17 G/17G
17 POWDER, FOR SOLUTION ORAL DAILY
Refills: 0 | Status: DISCONTINUED | OUTPATIENT
Start: 2020-02-25 | End: 2020-02-26

## 2020-02-25 RX ORDER — PHENAZOPYRIDINE HCL 100 MG
100 TABLET ORAL ONCE
Refills: 0 | Status: COMPLETED | OUTPATIENT
Start: 2020-02-25 | End: 2020-02-25

## 2020-02-25 RX ORDER — SODIUM CHLORIDE 9 MG/ML
1000 INJECTION, SOLUTION INTRAVENOUS
Refills: 0 | Status: DISCONTINUED | OUTPATIENT
Start: 2020-02-25 | End: 2020-02-26

## 2020-02-25 RX ORDER — NITROFURANTOIN MACROCRYSTAL 50 MG
1 CAPSULE ORAL
Qty: 60 | Refills: 0
Start: 2020-02-25 | End: 2020-03-25

## 2020-02-25 RX ORDER — NITROFURANTOIN MACROCRYSTAL 50 MG
100 CAPSULE ORAL
Refills: 0 | Status: DISCONTINUED | OUTPATIENT
Start: 2020-02-25 | End: 2020-02-26

## 2020-02-25 RX ORDER — ACETAMINOPHEN 500 MG
2 TABLET ORAL
Qty: 112 | Refills: 0
Start: 2020-02-25 | End: 2020-03-09

## 2020-02-25 RX ORDER — IBUPROFEN 200 MG
600 TABLET ORAL EVERY 6 HOURS
Refills: 0 | Status: DISCONTINUED | OUTPATIENT
Start: 2020-02-25 | End: 2020-02-26

## 2020-02-25 RX ORDER — POLYETHYLENE GLYCOL 3350 17 G/17G
17 POWDER, FOR SOLUTION ORAL
Qty: 238 | Refills: 0
Start: 2020-02-25 | End: 2020-03-09

## 2020-02-25 RX ORDER — DEXTROSE 50 % IN WATER 50 %
12.5 SYRINGE (ML) INTRAVENOUS ONCE
Refills: 0 | Status: DISCONTINUED | OUTPATIENT
Start: 2020-02-25 | End: 2020-02-26

## 2020-02-25 RX ORDER — MORPHINE SULFATE 50 MG/1
2 CAPSULE, EXTENDED RELEASE ORAL
Refills: 0 | Status: DISCONTINUED | OUTPATIENT
Start: 2020-02-25 | End: 2020-02-25

## 2020-02-25 RX ORDER — SENNA PLUS 8.6 MG/1
1 TABLET ORAL DAILY
Refills: 0 | Status: DISCONTINUED | OUTPATIENT
Start: 2020-02-25 | End: 2020-02-26

## 2020-02-25 RX ORDER — OXYCODONE HYDROCHLORIDE 5 MG/1
10 TABLET ORAL EVERY 6 HOURS
Refills: 0 | Status: DISCONTINUED | OUTPATIENT
Start: 2020-02-25 | End: 2020-02-26

## 2020-02-25 RX ORDER — INSULIN LISPRO 100/ML
VIAL (ML) SUBCUTANEOUS
Refills: 0 | Status: DISCONTINUED | OUTPATIENT
Start: 2020-02-25 | End: 2020-02-26

## 2020-02-25 RX ADMIN — OXYCODONE HYDROCHLORIDE 5 MILLIGRAM(S): 5 TABLET ORAL at 19:17

## 2020-02-25 RX ADMIN — Medication 100 MILLIGRAM(S): at 18:17

## 2020-02-25 RX ADMIN — Medication 600 MILLIGRAM(S): at 14:15

## 2020-02-25 RX ADMIN — SODIUM CHLORIDE 125 MILLILITER(S): 9 INJECTION, SOLUTION INTRAVENOUS at 11:01

## 2020-02-25 RX ADMIN — Medication 100 MILLIGRAM(S): at 07:11

## 2020-02-25 RX ADMIN — Medication 600 MILLIGRAM(S): at 21:54

## 2020-02-25 RX ADMIN — HYDROMORPHONE HYDROCHLORIDE 0.5 MILLIGRAM(S): 2 INJECTION INTRAMUSCULAR; INTRAVENOUS; SUBCUTANEOUS at 11:54

## 2020-02-25 RX ADMIN — Medication 600 MILLIGRAM(S): at 15:15

## 2020-02-25 RX ADMIN — OXYCODONE HYDROCHLORIDE 5 MILLIGRAM(S): 5 TABLET ORAL at 18:17

## 2020-02-25 RX ADMIN — Medication 6: at 21:54

## 2020-02-25 RX ADMIN — SENNA PLUS 1 TABLET(S): 8.6 TABLET ORAL at 14:16

## 2020-02-25 RX ADMIN — Medication 600 MILLIGRAM(S): at 22:54

## 2020-02-25 RX ADMIN — Medication 1000 MILLIGRAM(S): at 19:17

## 2020-02-25 RX ADMIN — Medication 2: at 11:35

## 2020-02-25 RX ADMIN — POLYETHYLENE GLYCOL 3350 17 GRAM(S): 17 POWDER, FOR SOLUTION ORAL at 14:16

## 2020-02-25 RX ADMIN — Medication 1000 MILLIGRAM(S): at 18:17

## 2020-02-25 RX ADMIN — HYDROMORPHONE HYDROCHLORIDE 0.5 MILLIGRAM(S): 2 INJECTION INTRAMUSCULAR; INTRAVENOUS; SUBCUTANEOUS at 11:47

## 2020-02-25 NOTE — DISCHARGE NOTE PROVIDER - NSDCCPCAREPLAN_GEN_ALL_CORE_FT
PRINCIPAL DISCHARGE DIAGNOSIS  Diagnosis: Vesicovaginal fistula  Assessment and Plan of Treatment: PRINCIPAL DISCHARGE DIAGNOSIS  Diagnosis: Vesicovaginal fistula  Assessment and Plan of Treatment:       SECONDARY DISCHARGE DIAGNOSES  Diagnosis: Hypertension  Assessment and Plan of Treatment:     Diagnosis: Diabetes mellitus  Assessment and Plan of Treatment:

## 2020-02-25 NOTE — BRIEF OPERATIVE NOTE - NSICDXBRIEFPROCEDURE_GEN_ALL_CORE_FT
PROCEDURES:  Repair of vaginal fistula 25-Feb-2020 09:16:30  Shantel Ramsay PROCEDURES:  Cystoscopy 25-Feb-2020 09:57:36  Shantel Ramsay  Repair of vaginal fistula 25-Feb-2020 09:16:30  Shantel Ramsay

## 2020-02-25 NOTE — H&P ADULT - ASSESSMENT
43y Female to be admitted post operative from fistula repair                              1. Neuro/Pain:  Acetaminophen ATC, motrin PRN, oxycodone PRN  2  CV:   VS per routine, previoustl during last admission needed lisinopril 20mg for HTN, will watch BPs closely  3. Pulm: Encourage ISS  4. GI: AAT, miralax daily & senna daily, diabetic diet  5. Endo: hx of Diabetes, ISS as needed in hospital   5. : Perez to stay in place - do not remove , oxybutynin 5mg TID  6. Heme: AM CBC  7. ID: Nitrofurantoin 100mg BID, perez to remain in place   8. DVT ppx: SCDs, Lovenox 40mg Qd, discussed with patient importance of ambulation  9. Dispo: meeting post op milestones and pain control

## 2020-02-25 NOTE — BRIEF OPERATIVE NOTE - OPERATION/FINDINGS
0.5cm anterior vaginal wall vesicovaginal fistula, confirmed by cystoscopy. UO patent. Water tight seal after repair in 3 layers of vicryl sutures. left stent placed for procedure. Multiple back fills preformed with methylene blue

## 2020-02-25 NOTE — H&P ADULT - HISTORY OF PRESENT ILLNESS
43y  presenting for scheduled vesico-vaginal fistula repair surgery. Patient had surgery in 2019 and 2019 for fistula repair without relief of symptoms. Continues to have leaking of urine per vagina since hysterectomy surgery in .   Denies fever, chills, chest pain, palpitations, SOB, n/v.  +flatus, +BM    OB H/x:  G1- MAB  G2-- C/S at 26wks with  death   G3- - C/S FT  G4-- C/S FT  GYN H/x:  Hx of menorrhagia w/ fibroid uterus, s/p hysterectomy 2018  Hx of vesicovaginal fistula (since hysterectomy), s/p fistula repair surgery 2019 and repair in 2019- w/o relief of symptoms     MED H/x: diabetes     SURG H/x:  C/S x3  Abdominal Hysterectomy 2018  Fistula Repair Surgery March and  requiring extensive SHIRA, bowel resection, cystotomy and repair)    Medications: lispro 100, nitrofurantoin (day 15)  Allergies: NKDA       Vital Signs Last 24 Hrs  Vital Signs Last 24 Hrs  T(C): 36.7 (2020 06:58), Max: 36.7 (2020 06:58)  T(F): 98.1 (2020 06:58), Max: 98.1 (2020 06:58)  HR: 80 (2020 06:58) (80 - 80)  BP: 139/89 (2020 06:58) (139/89 - 139/89)  BP(mean): --  RR: 16 (2020 06:58) (16 - 16)  SpO2: 99% (2020 06:58) (99% - 99%)    Physical Exam:  Gen: NAD, comfortable  GI: soft, nontender, nondistended + BS, no rebound no guarding  Ext: no edema, erythema, tenderness

## 2020-02-25 NOTE — DISCHARGE NOTE PROVIDER - NSDCFUADDINST_GEN_ALL_CORE_FT
Discharge Instructions for Patients of Dr. Karey Gale:    Activities:  - Avoid insertig anything in the vagina and avoid intercourse for 2 months if you had vaginal surgery   - Do not drive for 2 weeks  - Avoid heavy lifting (>10 lbs) for 2 weeks  - Do not work with machinery for 24 hours  - Rest at home for the first 24 hours  - Do not make any important decisions for 24 hours     Diet:  - DO NOT drink alcohol for 24 hours  - Eat normally as tolerated     Bathing:   - Avoid baths/swimming for 4-6 weeks   - Shower normally     Medication Instructions:  - Prescrptions called into [facility] at hospital  - Continue antibiotic for 30 days  - Do not drive if taking narcotic pain medications  - Take Motrin 600 mg every 6-8 hours and or Tylenol 550 mg every 4-6 hours for pain as first line   - If you are still experiencing pain, then take narcotic based pain medication as needed.   - You may restart all your pre-procedural medications as directed.   - Take Miralax 17 g daily. If you have not had a BM in greater than 2 days, take Milk of Magnesium as directed.     Follow-Up Care:   - Call the office to confirm your follow up appointment.   - Follow up in 1 week.

## 2020-02-25 NOTE — PROGRESS NOTE ADULT - SUBJECTIVE AND OBJECTIVE BOX
GYN POC    Pt seen and examined at bedside. Pt complains of mild abdominal pain controlled with pain medication. She had tolerated some water and crackers so far.   Pt denies any fever, chills, chest pain, SOB, nausea or vomiting.     T(F): 98.2 (02-25-20 @ 13:12), Max: 98.2 (02-25-20 @ 13:12)  HR: 77 (02-25-20 @ 13:12) (73 - 88)  BP: 126/83 (02-25-20 @ 13:12) (126/83 - 150/75)  RR: 16 (02-25-20 @ 13:12) (15 - 23)  SpO2: 98% (02-25-20 @ 13:12) (98% - 100%)  Wt(kg): --    02-25 @ 07:01  -  02-25 @ 14:54  --------------------------------------------------------  IN: 375 mL / OUT: 525 mL / NET: -150 mL    acetaminophen   Tablet .. 1000 milliGRAM(s) Oral every 6 hours  dextrose 40% Gel 15 Gram(s) Oral once PRN Blood Glucose LESS THAN 70 milliGRAM(s)/deciliter  dextrose 5%. 1000 milliLiter(s) IV Continuous <Continuous>  dextrose 50% Injectable 12.5 Gram(s) IV Push once  dextrose 50% Injectable 25 Gram(s) IV Push once  dextrose 50% Injectable 25 Gram(s) IV Push once  glucagon  Injectable 1 milliGRAM(s) IntraMuscular once PRN Glucose LESS THAN 70 milligrams/deciliter  ibuprofen  Tablet. 600 milliGRAM(s) Oral every 6 hours  insulin lispro (HumaLOG) corrective regimen sliding scale   SubCutaneous Before meals and at bedtime  lactated ringers. 1000 milliLiter(s) IV Continuous <Continuous>  nitrofurantoin monohydrate/macrocrystals (MACROBID) 100 milliGRAM(s) Oral two times a day with meals  oxybutynin 5 milliGRAM(s) Oral every 8 hours PRN Bladder spasms  oxyCODONE    IR 5 milliGRAM(s) Oral every 6 hours PRN Moderate Pain (4 - 6)  oxyCODONE    IR 10 milliGRAM(s) Oral every 6 hours PRN Severe Pain (7 - 10)  polyethylene glycol 3350 17 Gram(s) Oral daily  senna 1 Tablet(s) Oral daily    Physical exam:  Constitutional: NAD  Pulmonary: clear to auscultation bilaterally  Cardiovascular: regular rate and rhythm  Abdomen: Soft, mildly tender, nondistended  Extremities: no lower extremity edema, or calve tenderness. SCDs in place    A: 45yo POD0 s/p vesicovaginal fistula repair, cystoscopy, s/p ureteral stent placement. Pt is hemodynamically stable.      Plan:  Neuro: Toradol and Tylenol standing, Oxycodone prn, Dilaudid for breakthrough  Cardio: vital signs stable, continue to monitor per protocol  Pulm: incentive spirometer at least 10 times per hour while awake   GI: clears- advance diet as tolerated. Reglan, Zofran prn, Simethicone, Protonix   : Perez catheter- Pt to go home with perez catheter   Follow up labs   DVT ppx: SCDs   Activity: ambulate as tolerated

## 2020-02-25 NOTE — DISCHARGE NOTE PROVIDER - NSDCFUADDAPPT_GEN_ALL_CORE_FT
Please see Dr. Christopher in one week  Please see Primary car provider in one week to check blood pressure and finger stick log

## 2020-02-25 NOTE — DISCHARGE NOTE PROVIDER - NSDCMRMEDTOKEN_GEN_ALL_CORE_FT
acetaminophen 500 mg oral tablet: 2 tab(s) orally every 6 hours, As Needed -for moderate pain MDD:8   ibuprofen 600 mg oral tablet: 1 tab(s) orally every 6 hours -for mild pain MDD:4   nitrofurantoin macrocrystals-monohydrate 100 mg oral capsule: 1 cap(s) orally 2 times a day (with meals) MDD:2  polyethylene glycol 3350 oral powder for reconstitution: 17 gram(s) orally once a day MDD:17g acetaminophen 500 mg oral tablet: 2 tab(s) orally every 6 hours, As Needed -for moderate pain MDD:8   alcohol swabs : Apply topically to affected area 4 times a day   glucometer (per patient&#x27;s insurance): Test blood sugars four times a day. Dispense #1 glucometer.  ibuprofen 600 mg oral tablet: 1 tab(s) orally every 6 hours -for mild pain MDD:4   lancets: 1 application subcutaneously 4 times a day   lisinopril 20 mg oral tablet: 20 tab(s) orally once a day MDD:1 tablet  metFORMIN 500 mg oral tablet: 1 tab(s) orally 2 times a day MDD:2 tablets  nitrofurantoin macrocrystals-monohydrate 100 mg oral capsule: 1 cap(s) orally 2 times a day (with meals) MDD:2  polyethylene glycol 3350 oral powder for reconstitution: 17 gram(s) orally once a day MDD:17g  test strips (per patient&#x27;s insurance): 1 application subcutaneously 4 times a day. ** Compatible with patient&#x27;s glucometer **

## 2020-02-25 NOTE — DISCHARGE NOTE PROVIDER - HOSPITAL COURSE
Pt underwent uncomplicated vesicovaginal fistula repair with cystoscopy. Hospital course uneventful. Pt ambulating and tolerating PO at the time of discharge. VSS. dischasrged home with perez catheter Pt underwent uncomplicated vesicovaginal fistula repair with cystoscopy. Hospital course uneventful. Pt ambulating and tolerating PO at the time of discharge. VSS. dischasrged home with perez catheter. Endocrinology evaluated patient for elevated FS and elevated A1C, patient to be sent home on 500mg BID Metformin. Patient also started on previous dose Lisinopril 20mg qD for mildly elevated BP. Lisinopril

## 2020-02-25 NOTE — ASU PREOP CHECKLIST - 3.
noted small amount of rash and itch to  legs,wet towel given to wipe self   from chlorhexidine wipes dr. Aguirre  anesthesiologist aware

## 2020-02-25 NOTE — DISCHARGE NOTE PROVIDER - CARE PROVIDER_API CALL
Karey Gale (MD)  [facility]; Obstetrics and Gynecology  [address] [place] [address]  Phone: [phone]  Fax: [phone]  Follow Up Time:

## 2020-02-26 ENCOUNTER — TRANSCRIPTION ENCOUNTER (OUTPATIENT)
Age: 45
End: 2020-02-26

## 2020-02-26 VITALS
RESPIRATION RATE: 18 BRPM | HEART RATE: 75 BPM | DIASTOLIC BLOOD PRESSURE: 85 MMHG | OXYGEN SATURATION: 98 % | TEMPERATURE: 99 F | SYSTOLIC BLOOD PRESSURE: 128 MMHG

## 2020-02-26 LAB
GLUCOSE BLDC GLUCOMTR-MCNC: 138 MG/DL — HIGH (ref 70–99)
GLUCOSE BLDC GLUCOMTR-MCNC: 207 MG/DL — HIGH (ref 70–99)
GLUCOSE BLDC GLUCOMTR-MCNC: 210 MG/DL — HIGH (ref 70–99)
HBA1C BLD-MCNC: 7.5 % — HIGH (ref 4–5.6)
HCT VFR BLD CALC: 36.1 % — SIGNIFICANT CHANGE UP (ref 34.5–45)
HGB BLD-MCNC: 12.1 G/DL — SIGNIFICANT CHANGE UP (ref 11.5–15.5)
MCHC RBC-ENTMCNC: 28.3 PG — SIGNIFICANT CHANGE UP (ref 27–34)
MCHC RBC-ENTMCNC: 33.5 GM/DL — SIGNIFICANT CHANGE UP (ref 32–36)
MCV RBC AUTO: 84.5 FL — SIGNIFICANT CHANGE UP (ref 80–100)
NRBC # BLD: 0 /100 WBCS — SIGNIFICANT CHANGE UP (ref 0–0)
PLATELET # BLD AUTO: 190 K/UL — SIGNIFICANT CHANGE UP (ref 150–400)
RBC # BLD: 4.27 M/UL — SIGNIFICANT CHANGE UP (ref 3.8–5.2)
RBC # FLD: 12 % — SIGNIFICANT CHANGE UP (ref 10.3–14.5)
WBC # BLD: 7.51 K/UL — SIGNIFICANT CHANGE UP (ref 3.8–10.5)
WBC # FLD AUTO: 7.51 K/UL — SIGNIFICANT CHANGE UP (ref 3.8–10.5)

## 2020-02-26 PROCEDURE — 99222 1ST HOSP IP/OBS MODERATE 55: CPT | Mod: GC

## 2020-02-26 RX ORDER — INSULIN LISPRO 100/ML
4 VIAL (ML) SUBCUTANEOUS
Refills: 0 | Status: DISCONTINUED | OUTPATIENT
Start: 2020-02-26 | End: 2020-02-26

## 2020-02-26 RX ORDER — METFORMIN HYDROCHLORIDE 850 MG/1
1 TABLET ORAL
Qty: 60 | Refills: 0
Start: 2020-02-26 | End: 2020-03-26

## 2020-02-26 RX ORDER — LISINOPRIL 2.5 MG/1
20 TABLET ORAL
Qty: 600 | Refills: 0
Start: 2020-02-26 | End: 2020-03-26

## 2020-02-26 RX ORDER — LISINOPRIL 2.5 MG/1
20 TABLET ORAL DAILY
Refills: 0 | Status: DISCONTINUED | OUTPATIENT
Start: 2020-02-26 | End: 2020-02-26

## 2020-02-26 RX ORDER — ISOPROPYL ALCOHOL, BENZOCAINE .7; .06 ML/ML; ML/ML
1 SWAB TOPICAL
Qty: 100 | Refills: 1
Start: 2020-02-26 | End: 2020-04-15

## 2020-02-26 RX ORDER — LISINOPRIL 2.5 MG/1
1 TABLET ORAL
Qty: 30 | Refills: 0
Start: 2020-02-26 | End: 2020-03-26

## 2020-02-26 RX ADMIN — Medication 4 UNIT(S): at 18:37

## 2020-02-26 RX ADMIN — Medication 1000 MILLIGRAM(S): at 18:01

## 2020-02-26 RX ADMIN — Medication 1000 MILLIGRAM(S): at 00:00

## 2020-02-26 RX ADMIN — Medication 1000 MILLIGRAM(S): at 12:50

## 2020-02-26 RX ADMIN — Medication 600 MILLIGRAM(S): at 16:00

## 2020-02-26 RX ADMIN — Medication 1000 MILLIGRAM(S): at 05:25

## 2020-02-26 RX ADMIN — Medication 1000 MILLIGRAM(S): at 12:03

## 2020-02-26 RX ADMIN — Medication 600 MILLIGRAM(S): at 03:00

## 2020-02-26 RX ADMIN — Medication 600 MILLIGRAM(S): at 15:06

## 2020-02-26 RX ADMIN — Medication 600 MILLIGRAM(S): at 02:00

## 2020-02-26 RX ADMIN — Medication 1000 MILLIGRAM(S): at 17:01

## 2020-02-26 RX ADMIN — Medication 4: at 18:35

## 2020-02-26 RX ADMIN — Medication 1000 MILLIGRAM(S): at 01:00

## 2020-02-26 RX ADMIN — Medication 4: at 08:11

## 2020-02-26 RX ADMIN — Medication 100 MILLIGRAM(S): at 17:46

## 2020-02-26 RX ADMIN — Medication 600 MILLIGRAM(S): at 10:20

## 2020-02-26 RX ADMIN — Medication 100 MILLIGRAM(S): at 07:05

## 2020-02-26 RX ADMIN — LISINOPRIL 20 MILLIGRAM(S): 2.5 TABLET ORAL at 16:54

## 2020-02-26 RX ADMIN — Medication 600 MILLIGRAM(S): at 09:35

## 2020-02-26 RX ADMIN — Medication 4 UNIT(S): at 12:37

## 2020-02-26 RX ADMIN — Medication 1000 MILLIGRAM(S): at 06:25

## 2020-02-26 NOTE — PROGRESS NOTE ADULT - SUBJECTIVE AND OBJECTIVE BOX
GYN PROGRESS NOTE    Patient evaluated at the bedside. No acute events today. Patient was seen by endocrine who recommended Metformin 500mg BID, patient understands and will plan to follow up with outpatient Endocrinologist    Denies CP/SOB/dizziness/nausea/vomiting/abdominal pain/calf pain.    Pain well controlled on oral pain medications. Patient is ambulating independently, passing flatus and tolerating a regular diet.    O:   T(C): 36.9 (02-26-20 @ 13:37), Max: 36.9 (02-25-20 @ 22:06)  HR: 83 (02-26-20 @ 13:37) (73 - 92)  BP: 155/90 (02-26-20 @ 13:37) (124/83 - 155/90)  RR: 17 (02-26-20 @ 13:37) (17 - 17)  SpO2: 97% (02-26-20 @ 13:37) (97% - 99%)  Wt(kg): --    GEN: patient appears well  LUNGS: no respiratory distress  ABD: soft, non tender, non distended   EXT: no calf tenderness        02-25 @ 07:01  -  02-26 @ 07:00  --------------------------------------------------------  IN: 2500 mL / OUT: 2825 mL / NET: -325 mL    02-26 @ 07:01  -  02-26 @ 16:36  --------------------------------------------------------  IN: 0 mL / OUT: 700 mL / NET: -700 mL        MEDICATIONS  (STANDING):  acetaminophen   Tablet .. 1000 milliGRAM(s) Oral every 6 hours  dextrose 5%. 1000 milliLiter(s) (50 mL/Hr) IV Continuous <Continuous>  dextrose 50% Injectable 12.5 Gram(s) IV Push once  dextrose 50% Injectable 25 Gram(s) IV Push once  dextrose 50% Injectable 25 Gram(s) IV Push once  ibuprofen  Tablet. 600 milliGRAM(s) Oral every 6 hours  insulin lispro (HumaLOG) corrective regimen sliding scale   SubCutaneous Before meals and at bedtime  insulin lispro Injectable (HumaLOG) 4 Unit(s) SubCutaneous three times a day before meals  lisinopril 20 milliGRAM(s) Oral daily  nitrofurantoin monohydrate/macrocrystals (MACROBID) 100 milliGRAM(s) Oral two times a day with meals  polyethylene glycol 3350 17 Gram(s) Oral daily  senna 1 Tablet(s) Oral daily    MEDICATIONS  (PRN):  dextrose 40% Gel 15 Gram(s) Oral once PRN Blood Glucose LESS THAN 70 milliGRAM(s)/deciliter  glucagon  Injectable 1 milliGRAM(s) IntraMuscular once PRN Glucose LESS THAN 70 milligrams/deciliter  oxybutynin 5 milliGRAM(s) Oral every 8 hours PRN Bladder spasms  oxyCODONE    IR 5 milliGRAM(s) Oral every 6 hours PRN Moderate Pain (4 - 6)  oxyCODONE    IR 10 milliGRAM(s) Oral every 6 hours PRN Severe Pain (7 - 10)

## 2020-02-26 NOTE — CONSULT NOTE ADULT - SUBJECTIVE AND OBJECTIVE BOX
HPI:   44y  presenting for scheduled vesico-vaginal fistula repair surgery. Patient had surgery in 2019 and 2019 for fistula repair without relief of symptoms. Continues to have leaking of urine per vagina since hysterectomy surgery in .   OB H/x:  G1- MAB  G2-- C/S at 26wks with  death   G3- - C/S FT  G4-- C/S FT  GYN H/x:  Hx of menorrhagia w/ fibroid uterus, s/p hysterectomy 2018  Hx of vesicovaginal fistula (since hysterectomy), s/p fistula repair surgery 2019 and repair in 2019- w/o relief of symptom  Cystoscopy 2020 09:57:36  Shantel Ramsay  Repair of vaginal fistula 2020 09:16:30  Shantel Ramsay.       Operative Findings:  · Operative Findings	0.5cm anterior vaginal wall vesicovaginal fistula, confirmed by cystoscopy. UO patent. Water tight seal after repair in 3 layers of vicryl sutures. left stent placed for procedure. Multiple back fills preformed with methylene blue	    FSG & insulin:    Dinner FSG   Lispro   +    Ate  Bedtime FSG     Lantus      and Lispro         Breakfast FSG   Lispro    +    Ate  Lunch FSG      Lispro    +    Ate      Age at Dx:  How dx:  Hx and duration of insulin:  Current Therapy:  Hx of other regimens:    Home FSG:  Fasting  Lunch  Dinner  Bed    Diet:  Exercise:    Hx of hypoglycemia:  Hx of DKA/HHS:  Complications:  Outpatient Endo:    FH:  DM:  Thyroid:  Autoimmune:  Other:    SH:  Smoking  Etoh:  Recreational Drugs:  Social Life:    PMH & Surgical Hx:N82.0  SURGERY  No pertinent family history in first degree relatives  Handoff  Vesico-vaginal fistula  Vaginal fistula  Hypertension  Abnormal findings on imaging test  Bladder incontinence  Fibroids  Vesicovaginal fistula  Vesicovaginal fistula  Vesicovaginal fistula  Cystoscopy  Repair of vaginal fistula  History of  section  H/O: hysterectomy          Current Meds:  acetaminophen   Tablet .. 1000 milliGRAM(s) Oral every 6 hours  dextrose 40% Gel 15 Gram(s) Oral once PRN  dextrose 5%. 1000 milliLiter(s) IV Continuous <Continuous>  dextrose 50% Injectable 12.5 Gram(s) IV Push once  dextrose 50% Injectable 25 Gram(s) IV Push once  dextrose 50% Injectable 25 Gram(s) IV Push once  glucagon  Injectable 1 milliGRAM(s) IntraMuscular once PRN  ibuprofen  Tablet. 600 milliGRAM(s) Oral every 6 hours  insulin lispro (HumaLOG) corrective regimen sliding scale   SubCutaneous Before meals and at bedtime  insulin lispro Injectable (HumaLOG) 4 Unit(s) SubCutaneous three times a day before meals  nitrofurantoin monohydrate/macrocrystals (MACROBID) 100 milliGRAM(s) Oral two times a day with meals  oxybutynin 5 milliGRAM(s) Oral every 8 hours PRN  oxyCODONE    IR 5 milliGRAM(s) Oral every 6 hours PRN  oxyCODONE    IR 10 milliGRAM(s) Oral every 6 hours PRN  polyethylene glycol 3350 17 Gram(s) Oral daily  senna 1 Tablet(s) Oral daily      Allergies:  2% chlorhexidine wipes (Rash)  No Known Drug Allergies      ROS:  Denies the following except as indicated.    General: weight loss/weight gain, decreased appetite, fatigue  Eyes: Blurry vision, double vision, visual changes  ENT: Throat pain, changes in voice,   CV: palpitations, SOB, CP, cough  GI: NVD, difficulty swallowing, abdominal pain  : polyuria, dysuria  Endo: abnormal menses, temperature intolerance, decreased libido  MSK: weakness, joint pain  Skin: rash, dryness, diaphoresis  Heme: Easy bruising, bleeding  Neuro: HA, dizziness, lightheadedness, numbness/ tingling  Psych: Anxiety, Depression    Vital Signs Last 24 Hrs  T(C): 36.6 (2020 09:40), Max: 36.9 (2020 22:06)  T(F): 97.9 (2020 09:40), Max: 98.5 (2020 22:06)  HR: 78 (2020 09:40) (73 - 92)  BP: 154/95 (2020 09:40) (124/83 - 154/95)  BP(mean): 97 (2020 13:12) (97 - 100)  RR: 17 (2020 09:40) (16 - 23)  SpO2: 98% (2020 09:40) (97% - 100%)  Height (cm): 152.4 ( @ 06:58)  Weight (kg): 60.1 ( @ 06:58)  BMI (kg/m2): 25.9 ( @ 06:58)      Constitutional: wn/wd in NAD.   HEENT: NCAT, MMM, OP clear, EOMI, , no proptosis or lid retraction  Neck: no thyromegaly or palpable thyroid nodules   Respiratory: lungs CTAB.  Cardiovascular: regular rhythm, normal S1 and S2, no audible murmurs, no peripheral edema  GI: soft, NT/ND, no masses/HSM appreciated.  Neurology: no tremors, DTR 2+  Skin: no visible rashes/lesions. no acanthosis nigricans. no hyperlipotrophy. no cushing's stigmata.  Psychiatric: AAO x 3, normal affect/mood.  Ext: radial pulses intact, DP pulses intact, extremities warm, no cyanosis, clubbing or edema.       LABS:                        12.1   7.51  )-----------( 190      ( 2020 06:23 )             36.1                     RADIOLOGY & ADDITIONAL STUDIES:  CAPILLARY BLOOD GLUCOSE      POCT Blood Glucose.: 207 mg/dL (2020 08:01)  POCT Blood Glucose.: 261 mg/dL (2020 21:42)  POCT Blood Glucose.: 139 mg/dL (2020 16:33)  POCT Blood Glucose.: 163 mg/dL (2020 11:20)      Will discuss in rounds  A/P:44y POD1 from vesicovaginal fistula repair with cystoscopy, also with hyperglycemia.    1.  DM -   A1C:  Weight:  Cr/GRF:  ER:    Please continue lantus       units at night / morning.  Please continue lispro      units before each meal.  Please continue lispro moderate / low dose sliding scale four times daily with meals and at bedtime    Pt's fingerstick glucose goal is     Will continue to monitor     For discharge, pt can continue    Pt can follow up at discharge with [facility] by calling  to make an appointment.   Will discuss case with     and update primary team    To Make an appointment at [address] [place] [address] for the patient, either:  1. Send a STAT task via [employer] to Mary Russo or Cely Franz (office managers)   OR  2. Call supervisor's line. P: [phone] (do not give this number to patient). VM is checked periodically.  In the message, specify that this is a hospital discharge follow-up, and that the appt can be made with a NP if there is no timely MD availability.    REMINDERS FOR INSULIN/DIABETES SUPPLIES at DISCHARGE:  INSULIN:   Long actin/Basal Insulin: Examples: Toujeo, Basaglar, Tresiba, Lantus   Short acting/Bolus Insulin: Humalog, Admelog, Novolog  Please ensure that BOTH short acting and long acting insulin are prescribed in the same preparation (Ex: PEN vs VIAL/SOLUTION)     TESTING SUPPLIES:   All glucometer supplies should be written as generic to avoid issues with insurance. Use the free text option in sunrise prescription writer, and type in glucometer test strips, lancets, etc to order.    If sending patient home on insulin PEN, please send:   •	BD fariba insulin pen needles for use up to 4 times daily (total quantity 100)  •	Lancets for use up to 4 times daily (total quantity 100)  •	Glucometer Test strips for use up to 4 times daily (total quantity 100)  •	Alcohol swabs for use up to 4 times daily (total quantity 100)  •	Glucometer (If provided by hospital, still provide scripts for lancets, test strips, and swabs)  If sending patient home on insulin VIAL, please send:   •	Insulin syringes (6mm) - for use up to 4 times daily (total quantity 100)  •	Lancets for use up to 4 times daily (total quantity 100)  •	Generic Glucometer Test strips for use up to 4 times daily (total quantity 100)  •	Alcohol swabs for use up to 4 times daily (total quantity 100)  •	Generic Glucometer (If provided by hospital, still provide scripts for lancets, test strips, and swabs)  •	Do not specify brand for testing supplies (such as contour, freestyle, one touch etc) that way the pharmacy has the freedom to pick and change according to what the insurance dictates.  For patients without insurance:   •	Provide social work with appropriate scripts so they may obtain 1 week of samples  •	Provide with glucometer. Glucometers are located at various nursing stations, the nursing office, education, and endocrine fellows office.  •	Please make appointment with Alexa Mora NP or Jennifer Mendoza RN and ALICIA Grande at the [address] [place] [address] endocrinology clinic. They can see patients without insurance, provide appropriate samples, and assist in getting insurance coverage.     PREFERRED PHARMACY:  [facility] (located on 1st floor next to admitting)  P: [phone]  Hours: M – F 8AM – 8PM, Sat 8AM – 4PM, Sun—closed  If not using [facility], please follow up with chosen pharmacy to ensure insulin prescribed is covered. HPI:   44y  presenting for scheduled vesico-vaginal fistula repair surgery POD 1. Patient had surgery in 2019 and 2019 for fistula repair without relief of symptoms. Continues to have leaking of urine per vagina since hysterectomy surgery in 2018. In 2019, she was diagnosed with diabetes (a1C 6.5%) during hospital stay and started on metformin 500mg daily. Per patient, it was stopped by PCP because glucose was only slightly elevated. She has a history of gestational diabetes with last child, controlled with diet and no meds. A1C here is 7.5% and BG in 200s. Approx 6 lbs weight gain in past year. BMI 26 and 60kg.    FSG & insulin:  :  11Am . Lispro 2  4:30PM .  Bedtime . Lispro 6.  :  Breakfast . Lispro 4.  Lunch . Lispro 4 + 0         Diet: Breakfast- egg sandwich and tea with sugar. Lunch and dinner- 2 roti, small and thin, with veg blackwood and yogurt. No soda/juice or sweets.  Exercise: Limited to vaginal leakage.    Complications: denies  Outpatient Endo: none, PCP Dr Mcguire at [place].    FH:  DM: parents  Thyroid: denies  Autoimmune: denies  Other:    SH:  Smoking: denies  Etoh: denies  Recreational Drugs: denies  Social Life: denies    PMH & Surgical Hx:N82.0  SURGERY  No pertinent family history in first degree relatives  Handoff  Vesico-vaginal fistula  Vaginal fistula  Hypertension  Abnormal findings on imaging test  Bladder incontinence  Fibroids  Vesicovaginal fistula  Vesicovaginal fistula  Vesicovaginal fistula  Cystoscopy  Repair of vaginal fistula  History of  section  H/O: hysterectomy          Current Meds:  acetaminophen   Tablet .. 1000 milliGRAM(s) Oral every 6 hours  dextrose 40% Gel 15 Gram(s) Oral once PRN  dextrose 5%. 1000 milliLiter(s) IV Continuous <Continuous>  dextrose 50% Injectable 12.5 Gram(s) IV Push once  dextrose 50% Injectable 25 Gram(s) IV Push once  dextrose 50% Injectable 25 Gram(s) IV Push once  glucagon  Injectable 1 milliGRAM(s) IntraMuscular once PRN  ibuprofen  Tablet. 600 milliGRAM(s) Oral every 6 hours  insulin lispro (HumaLOG) corrective regimen sliding scale   SubCutaneous Before meals and at bedtime  insulin lispro Injectable (HumaLOG) 4 Unit(s) SubCutaneous three times a day before meals  nitrofurantoin monohydrate/macrocrystals (MACROBID) 100 milliGRAM(s) Oral two times a day with meals  oxybutynin 5 milliGRAM(s) Oral every 8 hours PRN  oxyCODONE    IR 5 milliGRAM(s) Oral every 6 hours PRN  oxyCODONE    IR 10 milliGRAM(s) Oral every 6 hours PRN  polyethylene glycol 3350 17 Gram(s) Oral daily  senna 1 Tablet(s) Oral daily      Allergies:  2% chlorhexidine wipes (Rash)  No Known Drug Allergies      ROS:  Denies the following except as indicated.    General: weight loss/weight gain, decreased appetite, fatigue  Eyes: Blurry vision, double vision, visual changes  ENT: Throat pain, changes in voice,   CV: palpitations, SOB, CP, cough  GI: NVD, difficulty swallowing, abdominal pain  : polyuria, dysuria  Endo: abnormal menses, temperature intolerance, decreased libido  MSK: weakness, joint pain  Skin: rash, dryness, diaphoresis  Heme: Easy bruising, bleeding  Neuro: HA, dizziness, lightheadedness, numbness/ tingling  Psych: Anxiety, Depression    Vital Signs Last 24 Hrs  T(C): 36.6 (2020 09:40), Max: 36.9 (2020 22:06)  T(F): 97.9 (2020 09:40), Max: 98.5 (2020 22:06)  HR: 78 (2020 09:40) (73 - 92)  BP: 154/95 (2020 09:40) (124/83 - 154/95)  BP(mean): 97 (2020 13:12) (97 - 100)  RR: 17 (2020 09:40) (16 - 23)  SpO2: 98% (2020 09:40) (97% - 100%)  Height (cm): 152.4 ( @ 06:58)  Weight (kg): 60.1 ( @ 06:58)  BMI (kg/m2): 25.9 ( @ 06:58)      Constitutional: wn/wd in NAD.   HEENT: NCAT, MMM, OP clear, EOMI, , no proptosis or lid retraction  Neck: mild thyromegaly with no palpable thyroid nodules   Respiratory: lungs CTAB.  Cardiovascular: regular rhythm, normal S1 and S2, no audible murmurs, no peripheral edema  GI: soft, NT/ND, no masses/HSM appreciated.  Neurology: no tremors, DTR 2+  Skin: no visible rashes/lesions. no acanthosis nigricans. no hyperlipotrophy. no cushing's stigmata.  Psychiatric: AAO x 3, normal affect/mood.  Ext: radial pulses intact, DP pulses intact, extremities warm, no cyanosis, clubbing or edema.       LABS:                        12.1   7.51  )-----------( 190      ( 2020 06:23 )             36.1       RADIOLOGY & ADDITIONAL STUDIES:  CAPILLARY BLOOD GLUCOSE      POCT Blood Glucose.: 207 mg/dL (2020 08:01)  POCT Blood Glucose.: 261 mg/dL (2020 21:42)  POCT Blood Glucose.: 139 mg/dL (2020 16:33)  POCT Blood Glucose.: 163 mg/dL (2020 11:20)      A/P:44y POD1 from vesicovaginal fistula repair with cystoscopy, also with hyperglycemia.    1.  DM - type 2, uncontrolled, complicated  A1C: 7.5%  Weight: 60kg/BMI 26      Please start metformin 500mg BID.    Pt's fingerstick glucose goal is 100-180    2.  Thyromegaly - mild, with no nodules. Should have TFTs with thyroid abs checked as OP. Pt aware.    Will continue to monitor     For discharge, pt can restart metformin 500mg BID. Glucometer testing supplies sent to [facility]. Pt knows how to test from pregnancy Reviewed BG goals. Reviewed diet modifications. Reiterated that BG must be controlled for good wound healing and decreased risk of infection. Pt lives on  and does not want to follow with endocrinology in [place]. Provided contact information for endocrinology at [facility] in [place].     Pt can follow up at discharge with [facility] by calling  to make an appointment.   Will discuss case with     and update primary team    To Make an appointment at [address] [place] [address] for the patient, either:  1. Send a STAT task via [employer] to Mary Russo or Cely Franz (office managers)   OR  2. Call supervisor's line. P: [phone] (do not give this number to patient). VM is checked periodically.  In the message, specify that this is a hospital discharge follow-up, and that the appt can be made with a NP if there is no timely MD availability.    REMINDERS FOR INSULIN/DIABETES SUPPLIES at DISCHARGE:  INSULIN:   Long actin/Basal Insulin: Examples: Toujeo, Basaglar, Tresiba, Lantus   Short acting/Bolus Insulin: Humalog, Admelog, Novolog  Please ensure that BOTH short acting and long acting insulin are prescribed in the same preparation (Ex: PEN vs VIAL/SOLUTION)     TESTING SUPPLIES:   All glucometer supplies should be written as generic to avoid issues with insurance. Use the free text option in sunrise prescription writer, and type in glucometer test strips, lancets, etc to order.    If sending patient home on insulin PEN, please send:   •	BD fariba insulin pen needles for use up to 4 times daily (total quantity 100)  •	Lancets for use up to 4 times daily (total quantity 100)  •	Glucometer Test strips for use up to 4 times daily (total quantity 100)  •	Alcohol swabs for use up to 4 times daily (total quantity 100)  •	Glucometer (If provided by hospital, still provide scripts for lancets, test strips, and swabs)  If sending patient home on insulin VIAL, please send:   •	Insulin syringes (6mm) - for use up to 4 times daily (total quantity 100)  •	Lancets for use up to 4 times daily (total quantity 100)  •	Generic Glucometer Test strips for use up to 4 times daily (total quantity 100)  •	Alcohol swabs for use up to 4 times daily (total quantity 100)  •	Generic Glucometer (If provided by hospital, still provide scripts for lancets, test strips, and swabs)  •	Do not specify brand for testing supplies (such as contour, freestyle, one touch etc) that way the pharmacy has the freedom to pick and change according to what the insurance dictates.  For patients without insurance:   •	Provide social work with appropriate scripts so they may obtain 1 week of samples  •	Provide with glucometer. Glucometers are located at various nursing stations, the nursing office, education, and endocrine fellows office.  •	Please make appointment with Alexa Mora NP or Jennifer Mendoza RN and ALICIA Grande at the [address] [place] [address] endocrinology clinic. They can see patients without insurance, provide appropriate samples, and assist in getting insurance coverage.     PREFERRED PHARMACY:  [facility] (located on 1st floor next to admitting)  P: [phone]  Hours: M – F 8AM – 8PM, Sat 8AM – 4PM, Sun—closed  If not using [facility], please follow up with chosen pharmacy to ensure insulin prescribed is covered.

## 2020-02-26 NOTE — PROGRESS NOTE ADULT - SUBJECTIVE AND OBJECTIVE BOX
URO GYN, AM NOTE  Pt seen and examined at bedside. Pain controlled with oral pain medication. She had tolerated regular diet.   Pt denies any fever, chills, chest pain, SOB, nausea or vomiting.     Vital Signs Last 24 Hrs  T(C): 36.8 (26 Feb 2020 05:36), Max: 36.9 (25 Feb 2020 22:06)  T(F): 98.3 (26 Feb 2020 05:36), Max: 98.5 (25 Feb 2020 22:06)  HR: 76 (26 Feb 2020 05:36) (73 - 92)  BP: 125/85 (26 Feb 2020 05:36) (124/83 - 150/75)  BP(mean): 97 (25 Feb 2020 13:12) (89 - 107)  RR: 17 (26 Feb 2020 05:36) (15 - 23)  SpO2: 99% (26 Feb 2020 05:36) (97% - 100%)       I&O's Detail    25 Feb 2020 07:01  -  26 Feb 2020 07:00  --------------------------------------------------------  IN:    lactated ringers.: 2500 mL  Total IN: 2500 mL    OUT:    Indwelling Catheter - Urethral: 2825 mL  Total OUT: 2825 mL    Total NET: -325 mL            acetaminophen   Tablet .. 1000 milliGRAM(s) Oral every 6 hours  dextrose 40% Gel 15 Gram(s) Oral once PRN Blood Glucose LESS THAN 70 milliGRAM(s)/deciliter  dextrose 5%. 1000 milliLiter(s) IV Continuous <Continuous>  dextrose 50% Injectable 12.5 Gram(s) IV Push once  dextrose 50% Injectable 25 Gram(s) IV Push once  dextrose 50% Injectable 25 Gram(s) IV Push once  glucagon  Injectable 1 milliGRAM(s) IntraMuscular once PRN Glucose LESS THAN 70 milligrams/deciliter  ibuprofen  Tablet. 600 milliGRAM(s) Oral every 6 hours  insulin lispro (HumaLOG) corrective regimen sliding scale   SubCutaneous Before meals and at bedtime  lactated ringers. 1000 milliLiter(s) IV Continuous <Continuous>  nitrofurantoin monohydrate/macrocrystals (MACROBID) 100 milliGRAM(s) Oral two times a day with meals  oxybutynin 5 milliGRAM(s) Oral every 8 hours PRN Bladder spasms  oxyCODONE    IR 5 milliGRAM(s) Oral every 6 hours PRN Moderate Pain (4 - 6)  oxyCODONE    IR 10 milliGRAM(s) Oral every 6 hours PRN Severe Pain (7 - 10)  polyethylene glycol 3350 17 Gram(s) Oral daily  senna 1 Tablet(s) Oral daily    Physical exam:  Constitutional: NAD  Pulmonary: clear to auscultation bilaterally  Cardiovascular: regular rate and rhythm  Abdomen: Soft, mildly tender, nondistended  Extremities: no lower extremity edema, or calve tenderness. SCDs in place  [place]: clear

## 2020-02-26 NOTE — PROGRESS NOTE ADULT - ASSESSMENT
43y POD1 from vesicovaginal fistula repair with cystoscopy                         1. Neuro/Pain:  controlled, acetaminophen ATC, motrin PRN, oxycodone PRN  2  CV:   VS per routine, previoustl during last admission needed lisinopril 20mg for HTN, will watch BPs closely  3. Pulm: Encourage ISS  4. GI: AAT, miralax daily & senna daily, diabetic diet  5. Endo: hx of Diabetes, ISS as needed in hospital   5. : Perez to stay in place - do not remove , oxybutynin 5mg TID  6. Heme: f/u AM CBC  7. ID: Nitrofurantoin 100mg BID, perez to remain in place   8. DVT ppx: SCDs, discussed with patient importance of ambulation  9. Dispo: meeting post op milestones and pain control. Today

## 2020-02-26 NOTE — PROGRESS NOTE ADULT - ASSESSMENT
43y POD2 from vesicovaginal fistula repair with cystoscopy with DM and elevated BP, currently will plan tos tart 20mg lisinopril                         1. Neuro/Pain:  controlled, acetaminophen ATC, motrin PRN, oxycodone PRN  2  CV:   VS per routine, previoustl during last admission needed lisinopril 20mg for HTN, will watch BPs closely  3. Pulm: Encourage ISS  4. GI: AAT, miralax daily & senna daily, diabetic diet  5. Endo: hx of Diabetes, ISS as needed in hospital   5. : Perez to stay in place - do not remove , oxybutynin 5mg TID  6. Heme: f/u AM CBC  7. ID: Nitrofurantoin 100mg BID, perez to remain in place   8. DVT ppx: SCDs, discussed with patient importance of ambulation  9. Dispo: meeting post op milestones and pain control. Today 43y POD2 from vesicovaginal fistula repair with cystoscopy with DM and elevated BP, currently will plan to start 20mg lisinopril for elevated BP (previoulsy on) and to start 500mg BID metformin as per endocrinology recs                         1. Neuro/Pain:  controlled, acetaminophen ATC, motrin PRN, oxycodone PRN  2  CV:   VS per routine, previoustl during last admission needed lisinopril 20mg for HTN, will watch BPs closely  3. Pulm: Encourage ISS  4. GI: AAT, miralax daily & senna daily, diabetic diet  5. Endo: hx of Diabetes, ISS as needed in hospital   5. : Perez to stay in place - do not remove , oxybutynin 5mg TID  6. Heme: f/u AM CBC  7. ID: Nitrofurantoin 100mg BID, perez to remain in place   8. DVT ppx: SCDs, discussed with patient importance of ambulation  9. Dispo: meeting post op milestones and pain control. Today

## 2020-02-26 NOTE — CONSULT NOTE ADULT - ATTENDING COMMENTS
Pt seen on rounds this afternoon.  She has a history of gestational DM, and was also already diabetic last year when the A1c level was noted to be 6.5%.  She has been reasonably adherent to her diet but the metformin was stopped a few weeks after her last surgery.  She has been moderately hyperglycemic during the past few months based on her current A1c, and the hyperglycemia after surgery was no doubt to katlyn-op stress.  --Emphasized to the pt that she has actual type 2 DM.  --Will restart metformin at 500 mg BID--she tolerated the 500 mg/day dose in the past without any side-effects.  --She will resume fingerstick monitoring--suggested pre-breakfast testing twice a week, but the remainder of the values should be taken 2 hours post-prandially--target of < 140-150.  --She will seek care with an endocrinologist closer to home

## 2020-02-26 NOTE — DISCHARGE NOTE NURSING/CASE MANAGEMENT/SOCIAL WORK - PATIENT PORTAL LINK FT
You can access the FollowMyHealth Patient Portal offered by [facility] by registering at the following website: [email or web address]. By joining [email or web address]’s FollowMyHealth portal, you will also be able to view your health information using other applications (apps) compatible with our system.

## 2020-03-02 LAB — SURGICAL PATHOLOGY STUDY: SIGNIFICANT CHANGE UP

## 2020-03-03 DIAGNOSIS — N82.0 VESICOVAGINAL FISTULA: ICD-10-CM

## 2020-03-03 DIAGNOSIS — I10 ESSENTIAL (PRIMARY) HYPERTENSION: ICD-10-CM

## 2020-03-03 DIAGNOSIS — Z90.710 ACQUIRED ABSENCE OF BOTH CERVIX AND UTERUS: ICD-10-CM

## 2020-03-03 DIAGNOSIS — E11.9 TYPE 2 DIABETES MELLITUS WITHOUT COMPLICATIONS: ICD-10-CM

## 2020-03-03 PROCEDURE — 82962 GLUCOSE BLOOD TEST: CPT

## 2020-03-03 PROCEDURE — C1758: CPT

## 2020-03-03 PROCEDURE — 83036 HEMOGLOBIN GLYCOSYLATED A1C: CPT

## 2020-03-03 PROCEDURE — C1889: CPT

## 2020-03-03 PROCEDURE — 88304 TISSUE EXAM BY PATHOLOGIST: CPT

## 2020-03-03 PROCEDURE — 85027 COMPLETE CBC AUTOMATED: CPT

## 2020-03-03 PROCEDURE — 36415 COLL VENOUS BLD VENIPUNCTURE: CPT

## 2020-03-03 PROCEDURE — 86901 BLOOD TYPING SEROLOGIC RH(D): CPT

## 2020-03-03 PROCEDURE — 86850 RBC ANTIBODY SCREEN: CPT

## 2020-03-04 ENCOUNTER — APPOINTMENT (OUTPATIENT)
Dept: UROGYNECOLOGY | Facility: CLINIC | Age: 45
End: 2020-03-04
Payer: COMMERCIAL

## 2020-03-04 PROCEDURE — 99024 POSTOP FOLLOW-UP VISIT: CPT

## 2020-03-04 NOTE — ASSESSMENT
[FreeTextEntry1] : Doing well.\par She was advised to continue with Detrol, Mirilax and Macrodantin.\par Her urine was sent for cx.

## 2020-03-04 NOTE — OBJECTIVE
[Soft and Nontender] : soft and nontender [Clean, Dry, Intact] : Clean, Dry, Intact [FreeTextEntry3] : Filled with 180 cc blue dyed fluid.  No leakage

## 2020-03-08 LAB — BACTERIA UR CULT: NORMAL

## 2020-03-18 ENCOUNTER — APPOINTMENT (OUTPATIENT)
Dept: UROGYNECOLOGY | Facility: CLINIC | Age: 45
End: 2020-03-18

## 2020-03-23 ENCOUNTER — APPOINTMENT (OUTPATIENT)
Dept: UROGYNECOLOGY | Facility: CLINIC | Age: 45
End: 2020-03-23
Payer: COMMERCIAL

## 2020-03-23 PROCEDURE — 99024 POSTOP FOLLOW-UP VISIT: CPT

## 2020-03-23 RX ORDER — CIPROFLOXACIN HYDROCHLORIDE 500 MG/1
500 TABLET, FILM COATED ORAL TWICE DAILY
Qty: 10 | Refills: 0 | Status: ACTIVE | COMMUNITY
Start: 2020-03-23 | End: 1900-01-01

## 2020-03-23 RX ORDER — FLUCONAZOLE 150 MG/1
150 TABLET ORAL
Qty: 2 | Refills: 0 | Status: ACTIVE | COMMUNITY
Start: 2020-03-23 | End: 1900-01-01

## 2020-03-25 NOTE — OBJECTIVE
[Soft and Nontender] : soft and nontender [Clean, Dry, Intact] : Clean, Dry, Intact [FreeTextEntry2] : white discharge [FreeTextEntry3] : Bladder qwas filled with blue dyed fluid to 250  cc. No extravasation noted

## 2020-03-26 LAB — BACTERIA UR CULT: ABNORMAL

## 2020-03-26 RX ORDER — CIPROFLOXACIN HYDROCHLORIDE 500 MG/1
500 TABLET, FILM COATED ORAL
Qty: 28 | Refills: 0 | Status: ACTIVE | COMMUNITY
Start: 2020-03-26 | End: 1900-01-01

## 2020-04-05 LAB — BACTERIA UR CULT: NORMAL

## 2020-05-05 RX ORDER — CIPROFLOXACIN HYDROCHLORIDE 500 MG/1
500 TABLET, FILM COATED ORAL TWICE DAILY
Qty: 10 | Refills: 0 | Status: ACTIVE | COMMUNITY
Start: 2020-05-05 | End: 1900-01-01

## 2020-05-07 ENCOUNTER — APPOINTMENT (OUTPATIENT)
Dept: UROGYNECOLOGY | Facility: CLINIC | Age: 45
End: 2020-05-07
Payer: COMMERCIAL

## 2020-05-07 PROCEDURE — 99024 POSTOP FOLLOW-UP VISIT: CPT

## 2020-05-07 NOTE — OBJECTIVE
[Soft and Nontender] : soft and nontender [Clean, Dry, Intact] : Clean, Dry, Intact [Healing well] : healing well [Good Support] : Good support [FreeTextEntry3] : instilled with 300 cc blue dyed fluid: no leakage noted [No Masses or Tenderness] : no masses or tenderness

## 2020-06-17 ENCOUNTER — APPOINTMENT (OUTPATIENT)
Dept: UROGYNECOLOGY | Facility: CLINIC | Age: 45
End: 2020-06-17
Payer: COMMERCIAL

## 2020-06-17 PROCEDURE — 51700 IRRIGATION OF BLADDER: CPT

## 2020-06-17 PROCEDURE — 99215 OFFICE O/P EST HI 40 MIN: CPT | Mod: 24

## 2020-06-17 NOTE — PHYSICAL EXAM
[Labia Majora] : were normal [Labia Minora] : were normal [Normal Appearance] : general appearance was normal [Pink Rugae] : pink rugae [Estrogen Effect] : estrogen effect was observed [No Bleeding] : there was no active vaginal bleeding [Normal] : no abnormalities [Post Void Residual ____ml] : post void residual was [unfilled] ml [Exam Deferred] : was deferred [de-identified] : no sig prolapse

## 2020-06-17 NOTE — PROCEDURE
[Other ___] : [unfilled] [Patient] : the patient [Specify ___] : with [unfilled] [Consent Obtained] : written consent was obtained prior to the procedure and is detailed in the patient's record [Allergies Reviewed] : Allergies reviewed [None] : none [No] : Specimen not sent to Pathology [No Complications] : none [Tolerated Well] : the patient tolerated the procedure well [FreeTextEntry1] : Bladder filled with blue dyed fluid.  No DO\par Blue dye noted from the left vaginal apex after filling 120 cc.\par I was unable to identify the exact location of feel for the VVF

## 2020-06-17 NOTE — HISTORY OF PRESENT ILLNESS
[FreeTextEntry1] : The pt is here for a f/u visit for worsening UI for 4 wks.  Her hx is sig for vaginal repair of VVF on 2/25. \par The pt was dry until then and path report was neg expt for chronic inflammation.\par Exam at 3 month showed neg extravasation of blue dyed fluid via vagina and urine cx was also neg.\par \par Her surgical hx is as below:\par 11/2018: FLORENTINO, BS for fibroid uterus by Gyn\par 3/2019: colonoscoscpy with diverticulosis, neg otherwise\par 3/2019: VVF repair, omental flap via laparotomy, SHIRA by GS by urogyn\par 11/2019: VVF repair via laparotomy my me, SHIRA and small segmental bowel resection due to extensive adhesions by GS \par 2/25/2020: vaginal repair of VVF\par \par Multiple CTs were also performed prior and after her repair.  All + for VVF only.\par \par \par  She was completely dry until June.  Her urine cx from 5/20 was neg and bladder insttillation from her frevious visit 2 wks ago was neg for VVF.\par She reports that her UI has not changed in the previous 2 wks

## 2020-06-17 NOTE — ASSESSMENT
[FreeTextEntry1] : Today's findings were discussed with the pt.  She will be scheduled for a repeat CT of abdomen/pelvis with contrast for further evaluation for underlying etiology of recurrent VVF x3.\par Also, office cystoscopy will be scheduled\par She was advised to try tampon for leakage \par F/U in 1 wk\par \par \par

## 2020-06-18 LAB — BACTERIA UR CULT: NORMAL

## 2020-06-20 ENCOUNTER — APPOINTMENT (OUTPATIENT)
Dept: CT IMAGING | Facility: CLINIC | Age: 45
End: 2020-06-20

## 2020-06-20 ENCOUNTER — OUTPATIENT (OUTPATIENT)
Dept: OUTPATIENT SERVICES | Facility: HOSPITAL | Age: 45
LOS: 1 days | End: 2020-06-20
Payer: COMMERCIAL

## 2020-06-20 DIAGNOSIS — N82.0 VESICOVAGINAL FISTULA: ICD-10-CM

## 2020-06-20 DIAGNOSIS — Z98.891 HISTORY OF UTERINE SCAR FROM PREVIOUS SURGERY: Chronic | ICD-10-CM

## 2020-06-20 DIAGNOSIS — Z90.710 ACQUIRED ABSENCE OF BOTH CERVIX AND UTERUS: Chronic | ICD-10-CM

## 2020-06-20 PROCEDURE — 74178 CT ABD&PLV WO CNTR FLWD CNTR: CPT | Mod: 26

## 2020-06-24 ENCOUNTER — APPOINTMENT (OUTPATIENT)
Dept: UROGYNECOLOGY | Facility: CLINIC | Age: 45
End: 2020-06-24
Payer: COMMERCIAL

## 2020-06-24 VITALS
SYSTOLIC BLOOD PRESSURE: 176 MMHG | WEIGHT: 124 LBS | BODY MASS INDEX: 24.35 KG/M2 | HEART RATE: 72 BPM | DIASTOLIC BLOOD PRESSURE: 110 MMHG | HEIGHT: 60 IN

## 2020-06-24 PROCEDURE — 52000 CYSTOURETHROSCOPY: CPT

## 2020-06-24 PROCEDURE — 99213 OFFICE O/P EST LOW 20 MIN: CPT | Mod: 24,25

## 2020-06-24 NOTE — PROCEDURE
[Other ___] : [unfilled] [Specify ___] : with [unfilled] [Patient] : the patient [Consent Obtained] : written consent was obtained prior to the procedure and is detailed in the patient's record [Allergies Reviewed] : Allergies reviewed [None] : none [1% Lidocaine ___(ml)] :  [unfilled]Uml of 1% Lidocaine [No Complications] : none [No] : Specimen not sent to Pathology [Betadine] : betadine [Post procedure instructions and information given] : post procedure instructions and information given and reviewed with patient. [Tolerated Well] : the patient tolerated the procedure well [FreeTextEntry1] : Nl obvious VVF.  +trabeculation and efflux of urine from both UO.\par No hypervascularity or tumor

## 2020-06-24 NOTE — ASSESSMENT
[FreeTextEntry1] : Finding from CT and todays cystoscopy dw the pt.\par Given that her leakage is minimal and she has been able to control her incontinence with tampons, I do not recommend that she undergo another surgery especially when this may be a diversion.\par She was in agreement.\par She was asked to undergo pelvic u/s in 3-6 months for hydrosalpinx.

## 2020-06-24 NOTE — HISTORY OF PRESENT ILLNESS
[FreeTextEntry1] : The pt is here for a f/u visit after undergoing CT of abdomen/pelvis and office cystoscopy.\par CT: neg except for hydrosalpinx, no foreign body or tumor\par \par She reports that she wears 4-5 tempons/24 hrs but they are just moist.  She is OK with her current status

## 2020-12-21 PROBLEM — Z86.19 HISTORY OF CANDIDIASIS OF VAGINA: Status: RESOLVED | Noted: 2019-03-29 | Resolved: 2020-12-21

## 2021-03-31 NOTE — ASU PATIENT PROFILE, ADULT - AS SC BRADEN MOBILITY
Patient spouse called and would like to talk to a nurse re: uti and medication   
Wife expresses concern with urine results. States he is on Cipro but questions if another antibiotic should be started. She denies any new symptoms, fever, or chills. Educated wife to have him finish the Cipro and keep the follow up appointment he has scheduled. Wife verbalizes understanding and agreement with this plan.   
(4) no limitation

## 2021-07-21 ENCOUNTER — APPOINTMENT (OUTPATIENT)
Dept: OPHTHALMOLOGY | Facility: CLINIC | Age: 46
End: 2021-07-21
Payer: SELF-PAY

## 2021-07-21 ENCOUNTER — NON-APPOINTMENT (OUTPATIENT)
Age: 46
End: 2021-07-21

## 2021-07-21 PROCEDURE — 92015 DETERMINE REFRACTIVE STATE: CPT | Mod: NC

## 2021-07-21 PROCEDURE — 92004 COMPRE OPH EXAM NEW PT 1/>: CPT

## 2021-12-21 ENCOUNTER — APPOINTMENT (OUTPATIENT)
Dept: UROGYNECOLOGY | Facility: CLINIC | Age: 46
End: 2021-12-21
Payer: COMMERCIAL

## 2021-12-21 ENCOUNTER — RESULT CHARGE (OUTPATIENT)
Age: 46
End: 2021-12-21

## 2021-12-21 LAB
BILIRUB UR QL STRIP: NEGATIVE
CLARITY UR: CLEAR
COLLECTION METHOD: NORMAL
GLUCOSE UR-MCNC: 1000
HCG UR QL: 0.2 EU/DL
HGB UR QL STRIP.AUTO: NEGATIVE
KETONES UR-MCNC: NORMAL
LEUKOCYTE ESTERASE UR QL STRIP: NEGATIVE
NITRITE UR QL STRIP: NEGATIVE
PH UR STRIP: 7
PROT UR STRIP-MCNC: NEGATIVE
SP GR UR STRIP: 1.02

## 2021-12-21 PROCEDURE — 99213 OFFICE O/P EST LOW 20 MIN: CPT

## 2021-12-21 NOTE — ASSESSMENT
[FreeTextEntry1] : Hx vesicovag fistula now controlled/healed, s/p multiple surgeries. With 6 months of recurrent BV and/or yeast infections vaginally (cx-proven?) currently on topical metronidazole and teraconazole, and poorly controlled DM2 recently diagnosed.\par \par Plan:\par [] f/u with Gyn - consider recurrent BV and/or recurrent yeast prophylaxis\par [] f/u affirm today and treat prn active infection\par [] DM glucose control - consider discussion with PMD, likely will need more than dietary management with FSGs over 200 - we discussed the possible and likely correlation with poor glucose control and these recurrent vaginal infections\par [] RTO prn, suspect UTIs and/or UI symptoms such as UUI or MISTI

## 2021-12-21 NOTE — HISTORY OF PRESENT ILLNESS
[FreeTextEntry1] : Patient is known to me, I have not seen her since 2019 at which time she was treated for vesicovaginal fistula. She failed the surgery, and continued on to have 2 further surgeries both abd and vag for persistent/recurrent vesicovag fistula. At this time, she reports only rare small clear leakage per vagina which is infrequent and not bothersome. No continuous leakage of urine/vaginal leakage, and no leakage per urethra such as with cough sneeze or urgency. No gross hematuria, no dysuria, no recurrent UTIs. Sexually active without issue. Reports about 6 months ago, has noted intermittent vaginal and vulvar itch responding to (currently) metronidazole gel and teraconazole/steroid cream. She feels she is getting recurrent vaginal BV and yeast infections that return after about 3 weeks s/p treatment. No other complaints. Has Gyn she started with at [facility] in [facility]. Also diagnosed with DM2 about 7 months ago, reports FSGs over 200 generally, and as of now she has held-off on oral medication.\par \par Underwent surgery with myself 3/6/2019: Dx LSC, SHIRA, robotic converted to open vesicovaginal fistula repair, cysto.\par \par 6/2019: vesicovaginal fistula repair abdominally and vaginally, ureteral stent placement, cysto.\par \par 11/2019: EUA, ureteral stent placement and removal b/l, cysto, biopsy of bladder and vagina.\par \par 2/2020: vesicovaginal fistula repair vaginally, cysto, ureteral stent placement and removal.\par

## 2021-12-21 NOTE — PHYSICAL EXAM
[Chaperone Present] : A chaperone was present in the examining room during all aspects of the physical examination [No Acute Distress] : in no acute distress [Oriented x3] : oriented to person, place, and time [Tenderness] : ~T no ~M abdominal tenderness observed [Distended] : not distended [None] : no CVA tenderness [Labia Majora] : were normal [Labia Minora] : were normal [Normal Appearance] : general appearance was normal [No Bleeding] : there was no active vaginal bleeding [Normal] : was normal [] : 0 [Absent] : absent [Soft] :  the cervix was soft [FreeTextEntry4] : cuff intact, no gross fistula noted, no lesion / mass / cyst; mild erythema near apex c/w irritation but no discharge

## 2021-12-23 LAB
CANDIDA VAG CYTO: DETECTED
G VAGINALIS+PREV SP MTYP VAG QL MICRO: DETECTED
T VAGINALIS VAG QL WET PREP: NOT DETECTED

## 2021-12-23 RX ORDER — METRONIDAZOLE 7.5 MG/G
0.75 GEL VAGINAL
Qty: 1 | Refills: 0 | Status: ACTIVE | COMMUNITY
Start: 2021-12-23 | End: 1900-01-01

## 2021-12-23 RX ORDER — FLUCONAZOLE 200 MG/1
200 TABLET ORAL ONCE
Qty: 9 | Refills: 0 | Status: ACTIVE | COMMUNITY
Start: 2021-12-23 | End: 1900-01-01

## 2022-05-13 ENCOUNTER — RESULT CHARGE (OUTPATIENT)
Age: 47
End: 2022-05-13

## 2022-05-13 ENCOUNTER — APPOINTMENT (OUTPATIENT)
Dept: UROGYNECOLOGY | Facility: CLINIC | Age: 47
End: 2022-05-13
Payer: COMMERCIAL

## 2022-05-13 VITALS — SYSTOLIC BLOOD PRESSURE: 124 MMHG | DIASTOLIC BLOOD PRESSURE: 76 MMHG

## 2022-05-13 DIAGNOSIS — B37.49 OTHER UROGENITAL CANDIDIASIS: ICD-10-CM

## 2022-05-13 LAB
BILIRUB UR QL STRIP: NORMAL
CLARITY UR: CLEAR
COLLECTION METHOD: NORMAL
GLUCOSE UR-MCNC: 1000
HCG UR QL: 0.2 EU/DL
HGB UR QL STRIP.AUTO: NORMAL
KETONES UR-MCNC: NORMAL
LEUKOCYTE ESTERASE UR QL STRIP: NORMAL
NITRITE UR QL STRIP: NORMAL
PH UR STRIP: 5
PROT UR STRIP-MCNC: NORMAL
SP GR UR STRIP: 1.02

## 2022-05-13 PROCEDURE — 99213 OFFICE O/P EST LOW 20 MIN: CPT | Mod: 25

## 2022-05-13 PROCEDURE — 51798 US URINE CAPACITY MEASURE: CPT

## 2022-05-13 RX ORDER — FLUCONAZOLE 150 MG/1
150 TABLET ORAL
Qty: 12 | Refills: 0 | Status: ACTIVE | COMMUNITY
Start: 2022-05-13 | End: 1900-01-01

## 2022-05-13 RX ORDER — METRONIDAZOLE 7.5 MG/G
0.75 GEL VAGINAL
Qty: 1 | Refills: 0 | Status: ACTIVE | COMMUNITY
Start: 2022-05-13 | End: 1900-01-01

## 2022-05-13 NOTE — ASSESSMENT
[FreeTextEntry1] : Maximiliano presents for recurrent BV and vaginal yeast infections. I have advised her to followup with her Gyn to consider cultures/check species subtypes. We discussed prophylactic regimens. I have sent fluconazole orally weekly prophylaxis and metrogel twice weekly prophy x 3 months. She understood and we discussed the regimens. She also inquired about an episode of 1-2 days of umbilical discomfort, no N/V, shooting type of pain, not related to PO intake and no radiation. It has since resolved. We discussed a GI differential, as well as concern for incisional hernia. CT to eval further discussed which she declined for now as her symptoms have resolved. She will follow her symptoms for now and see her PMD. If N/V, severe pain then to ED immediately. She understood. All ques answered.

## 2022-05-13 NOTE — HISTORY OF PRESENT ILLNESS
[FreeTextEntry1] : Hx vesicovag fistula now controlled/healed, s/p multiple surgeries. Last seen by myself 12/2021 with 6 months of recurrent BV and/or yeast infections vaginally (cx-proven?) on topical metronidazole and teraconazole, and poorly controlled DM2 recently diagnosed. She was having questionable UTIs vs UUI/MISTI. Candida and BV were pos on culture in 12/21. Today she presents for a similar issue. Reports no dysuria or suspicion for UTI, no hematuria or UI, however intermittent vaginal itch and reports she still gets the vaginal yeast and BV infections. No new sexual partners, no vag discharge or bleeding. However, she still gets small drops of vaginal leakage of urine if bladder is full, therefore she tries to keep empty and void frequently to avoid this.

## 2022-05-31 NOTE — BRIEF OPERATIVE NOTE - DISPOSITION
PACU to [facility] PAST SURGICAL HISTORY:  No significant past surgical history

## 2022-11-15 ENCOUNTER — APPOINTMENT (OUTPATIENT)
Dept: UROGYNECOLOGY | Facility: CLINIC | Age: 47
End: 2022-11-15

## 2022-11-15 DIAGNOSIS — N76.0 ACUTE VAGINITIS: ICD-10-CM

## 2022-11-15 DIAGNOSIS — N95.2 POSTMENOPAUSAL ATROPHIC VAGINITIS: ICD-10-CM

## 2022-11-15 DIAGNOSIS — R10.2 PELVIC AND PERINEAL PAIN: ICD-10-CM

## 2022-11-15 DIAGNOSIS — M62.838 OTHER MUSCLE SPASM: ICD-10-CM

## 2022-11-15 DIAGNOSIS — R10.9 UNSPECIFIED ABDOMINAL PAIN: ICD-10-CM

## 2022-11-15 DIAGNOSIS — L30.9 DERMATITIS, UNSPECIFIED: ICD-10-CM

## 2022-11-15 DIAGNOSIS — B96.89 ACUTE VAGINITIS: ICD-10-CM

## 2022-11-15 DIAGNOSIS — N82.0 VESICOVAGINAL FISTULA: ICD-10-CM

## 2022-11-15 DIAGNOSIS — L29.2 PRURITUS VULVAE: ICD-10-CM

## 2022-11-15 DIAGNOSIS — N32.81 OVERACTIVE BLADDER: ICD-10-CM

## 2022-11-15 DIAGNOSIS — B37.31 ACUTE CANDIDIASIS OF VULVA AND VAGINA: ICD-10-CM

## 2022-11-15 DIAGNOSIS — N39.0 URINARY TRACT INFECTION, SITE NOT SPECIFIED: ICD-10-CM

## 2022-11-15 DIAGNOSIS — N89.8 OTHER SPECIFIED NONINFLAMMATORY DISORDERS OF VAGINA: ICD-10-CM

## 2022-11-15 PROCEDURE — 99213 OFFICE O/P EST LOW 20 MIN: CPT

## 2022-11-15 PROCEDURE — 51798 US URINE CAPACITY MEASURE: CPT

## 2022-11-15 NOTE — ASSESSMENT
[FreeTextEntry1] : Patient to go for CT A/P with contrast to eval for ? hernia, abdominal pathology. Differential diagnosis including hernia, eval pancreas/GI structures discussed. F/U depending on results. Gyn for consideration of recurrent BV/yeast protocols.

## 2022-11-15 NOTE — PHYSICAL EXAM
[Chaperone Present] : A chaperone was present in the examining room during all aspects of the physical examination [No Acute Distress] : in no acute distress [Oriented x3] : oriented to person, place, and time [Tenderness] : ~T no ~M abdominal tenderness observed [Distended] : not distended [None] : no CVA tenderness [Soft, Nontender] : the abdomen was soft and nontender [Normal] : normal external genitalia [Post Void Residual ____ml] : post void residual was [unfilled] ml [FreeTextEntry3] : e

## 2022-11-15 NOTE — HISTORY OF PRESENT ILLNESS
[FreeTextEntry1] : Hx vesicovag fistula now controlled/healed, s/p multiple surgeries. Last seen by myself early 2022 for recurrent yeast and BV infections for which she followed up with Gyn for potential prophylactic strategies. She has DM2. Today, reports continued every other week yeast or BV infections requiring treatment each time. Unchanged small amount of drops of urine leakage per vagina. No dysuria, voids, no hematuria, no recurrent UTIs or flank pain. SA about once monthly, the vaginitis has been keeping her from having more usual relations. Presents to me today for second episode (had it prior earlier this spring once) of 1-2 days of periumbilical sharp pain, moderate, requiring her to lay down. No radiation, no relation to PO intake timing or passage of BMs (which she reports are normal), no fever/chills, no flank pain, no suprapubic pain, no thick vaginal discharge or bleeding.\par \par \par

## 2023-01-06 ENCOUNTER — OFFICE (OUTPATIENT)
Dept: URBAN - METROPOLITAN AREA CLINIC 103 | Facility: CLINIC | Age: 48
Setting detail: OPHTHALMOLOGY
End: 2023-01-06
Payer: MEDICAID

## 2023-01-06 DIAGNOSIS — E11.3293: ICD-10-CM

## 2023-01-06 DIAGNOSIS — H52.7: ICD-10-CM

## 2023-01-06 PROCEDURE — 92015 DETERMINE REFRACTIVE STATE: CPT | Performed by: OPHTHALMOLOGY

## 2023-01-06 PROCEDURE — 92004 COMPRE OPH EXAM NEW PT 1/>: CPT | Performed by: OPHTHALMOLOGY

## 2023-01-06 PROCEDURE — 92250 FUNDUS PHOTOGRAPHY W/I&R: CPT | Performed by: OPHTHALMOLOGY

## 2023-01-06 ASSESSMENT — REFRACTION_CURRENTRX
OD_AXIS: 061
OS_SPHERE: -1.50
OS_CYLINDER: -0.50
OS_AXIS: 091
OD_OVR_VA: 20/
OS_VPRISM_DIRECTION: SV
OD_SPHERE: -1.50
OS_OVR_VA: 20/
OD_CYLINDER: -0.50
OD_VPRISM_DIRECTION: SV

## 2023-01-06 ASSESSMENT — REFRACTION_MANIFEST
OS_SPHERE: -1.25
OD_VA1: 20/20
OS_AXIS: 089
OD_AXIS: 052
OS_VA1: 20/20
OD_CYLINDER: -0.50
OS_CYLINDER: -0.50
OD_SPHERE: -1.50

## 2023-01-06 ASSESSMENT — KERATOMETRY
OD_K2POWER_DIOPTERS: 44.50
OD_AXISANGLE_DEGREES: 095
OS_K2POWER_DIOPTERS: 44.00
OD_K1POWER_DIOPTERS: 43.75
OS_K1POWER_DIOPTERS: 44.00

## 2023-01-06 ASSESSMENT — REFRACTION_AUTOREFRACTION
OS_AXIS: 083
OS_CYLINDER: -0.75
OD_SPHERE: -1.25
OD_AXIS: 057
OD_CYLINDER: -0.50
OS_SPHERE: -1.25

## 2023-01-06 ASSESSMENT — CONFRONTATIONAL VISUAL FIELD TEST (CVF)
OD_FINDINGS: FULL
OS_FINDINGS: FULL

## 2023-01-06 ASSESSMENT — SPHEQUIV_DERIVED
OD_SPHEQUIV: -1.5
OD_SPHEQUIV: -1.75
OS_SPHEQUIV: -1.625
OS_SPHEQUIV: -1.5

## 2023-01-06 ASSESSMENT — AXIALLENGTH_DERIVED
OS_AL: 23.9996
OD_AL: 23.9522
OS_AL: 24.0501
OD_AL: 24.053

## 2023-01-06 ASSESSMENT — TONOMETRY
OS_IOP_MMHG: 18
OD_IOP_MMHG: 19

## 2023-01-06 ASSESSMENT — VISUAL ACUITY
OS_BCVA: 20/20
OD_BCVA: 20/20

## 2023-02-17 ENCOUNTER — APPOINTMENT (OUTPATIENT)
Dept: UROGYNECOLOGY | Facility: CLINIC | Age: 48
End: 2023-02-17

## 2023-04-03 NOTE — PATIENT PROFILE ADULT - ANY SIGNIFICANT CHANGE IN ABILITY TO PERFORM THE FOLLOWING ADL SINCE THE ONSET OF PRESENT ILLNESS?
This was a shared visit with the MANOJ. I reviewed and verified the documentation and independently performed the documented: no

## 2023-05-05 NOTE — ASU PATIENT PROFILE, ADULT - PREOP PAIN SCORE
RRT called due to a pause in pt HR dropping from 110s to 48 while on cardizem drip at 15ml/hr and amiodarone drip. EKG done showed pt converted from Afib to SB. Amio and Cardizem drip stopped. CXR ordered and PO lasix given to pt. Overnight pt had to be bumped from RA to 6L due to desatting.    0

## 2024-06-21 NOTE — PROGRESS NOTE ADULT - ASSESSMENT
6/24/24    Jim Willingham  [address]  [place] [address]    Dear Jim,    Thank you for your interest in the Transplant Center at [facility]. We look forward to being a part of your care team and assisting you through the transplant process.    As we discussed, your transplant coordinator is Sarah Mosley (Kidney).  You may call your coordinator at any time with questions or concerns.  Your first scheduled call will be on 7/12/24 between 8:00 and 12:00.  If this needs to change, call [phone].    Please complete the following.    Fill out and return the enclosed forms  Authorization for Electronic Communication  Authorization to Discuss Protected Health Information  Authorization for Release of Protected Health Information  Authorization for Care Everywhere Release of Information    Sign up for:  [employer], access to your electronic medical record (see enclosed pamphlet)  [employer], a transplant education website      You can use these tools to learn more about your transplant, communicate with your care team, and track your medical details      Sincerely,      Solid Organ Transplant  [facility]    cc: Care Team   43y Female POD# 1 s/p extensive exploratory laparotomy with small bowel resection, SHIRA, vesico-vaginal fistula repair, cystotomy repair. Patient clinically stable.                                1. Neuro/Pain:  Acetaminophen ATC, Dilaudid PCA, possibly transition to PO pain medications today , s/p Exparel injection in OR  2  CV:   VS per routine, tachycardiac overnight, asymptomatic, stat EKG ordered at this time   3. Pulm: Encourage ISS  4. GI: NPO with sips for meds, NS at 125 cc / hr , discuss with general surgery regarding advancing diet today , zofran and reglan PRN nausea , colace BID   5. :  s/p b/l stents, Perez in place - do not remove , oxybutynin 5mg TID  6. Heme: PM cbc stable at 11.4; AM CBC ending  7. ID: continue with ancef 2g q 8hrs given bowel resection and perez to remain in place   8. DVT ppx: SCDs, Lovenox 40mg Qd  9. Dispo: meeting post op milestones and pain control

## 2024-12-26 SDOH — ECONOMIC STABILITY: GENERAL: WOULD YOU LIKE HELP WITH ANY OF THE FOLLOWING NEEDS?: I DON'T WANT HELP WITH ANY OF THESE

## 2024-12-26 SDOH — ECONOMIC STABILITY: FOOD INSECURITY: WITHIN THE PAST 12 MONTHS, THE FOOD YOU BOUGHT JUST DIDN'T LAST AND YOU DIDN'T HAVE MONEY TO GET MORE.: NEVER TRUE

## 2024-12-26 SDOH — ECONOMIC STABILITY: TRANSPORTATION INSECURITY
IN THE PAST 12 MONTHS, HAS LACK OF RELIABLE TRANSPORTATION KEPT YOU FROM MEDICAL APPOINTMENTS, MEETINGS, WORK OR FROM GETTING THINGS NEEDED FOR DAILY LIVING?: NO

## 2024-12-26 SDOH — ECONOMIC STABILITY: HOUSING INSECURITY: WHAT IS YOUR LIVING SITUATION TODAY?: I HAVE A STEADY PLACE TO LIVE

## 2024-12-26 SDOH — ECONOMIC STABILITY: HOUSING INSECURITY: DO YOU HAVE PROBLEMS WITH ANY OF THE FOLLOWING?: NONE OF THE ABOVE

## 2024-12-26 ASSESSMENT — SOCIAL DETERMINANTS OF HEALTH (SDOH): IN THE PAST 12 MONTHS, HAS THE ELECTRIC, GAS, OIL, OR WATER COMPANY THREATENED TO SHUT OFF SERVICE IN YOUR HOME?: NO

## 2025-01-02 ENCOUNTER — APPOINTMENT (OUTPATIENT)
Dept: INTERNAL MEDICINE | Age: 50
End: 2025-01-02

## 2025-01-02 VITALS
WEIGHT: 116.2 LBS | SYSTOLIC BLOOD PRESSURE: 170 MMHG | HEIGHT: 60 IN | BODY MASS INDEX: 22.81 KG/M2 | HEART RATE: 73 BPM | OXYGEN SATURATION: 99 % | TEMPERATURE: 98.2 F | DIASTOLIC BLOOD PRESSURE: 94 MMHG

## 2025-01-02 DIAGNOSIS — Z00.00 ANNUAL PHYSICAL EXAM: Primary | ICD-10-CM

## 2025-01-02 DIAGNOSIS — G43.909 MIGRAINE WITHOUT STATUS MIGRAINOSUS, NOT INTRACTABLE, UNSPECIFIED MIGRAINE TYPE: ICD-10-CM

## 2025-01-02 DIAGNOSIS — N39.46 MIXED STRESS AND URGE URINARY INCONTINENCE: ICD-10-CM

## 2025-01-02 DIAGNOSIS — Z12.31 ENCOUNTER FOR SCREENING MAMMOGRAM FOR MALIGNANT NEOPLASM OF BREAST: ICD-10-CM

## 2025-01-02 DIAGNOSIS — I10 PRIMARY HYPERTENSION: ICD-10-CM

## 2025-01-02 DIAGNOSIS — Z23 NEED FOR VACCINATION: ICD-10-CM

## 2025-01-02 DIAGNOSIS — N89.8 VAGINAL ITCHING: ICD-10-CM

## 2025-01-02 DIAGNOSIS — E11.9 TYPE 2 DIABETES MELLITUS WITHOUT COMPLICATION, WITHOUT LONG-TERM CURRENT USE OF INSULIN  (CMD): ICD-10-CM

## 2025-01-02 RX ORDER — LOSARTAN POTASSIUM 100 MG/1
100 TABLET ORAL DAILY
Qty: 90 TABLET | Refills: 3 | Status: SHIPPED | OUTPATIENT
Start: 2025-01-02

## 2025-01-02 RX ORDER — SUMATRIPTAN 50 MG/1
50 TABLET, FILM COATED ORAL DAILY PRN
Qty: 6 TABLET | Refills: 1 | Status: SHIPPED | OUTPATIENT
Start: 2025-01-02

## 2025-01-02 RX ORDER — BLOOD PRESSURE TEST KIT
KIT MISCELLANEOUS
Qty: 1 KIT | Refills: 0 | Status: SHIPPED | OUTPATIENT
Start: 2025-01-02

## 2025-01-02 ASSESSMENT — PATIENT HEALTH QUESTIONNAIRE - PHQ9
1. LITTLE INTEREST OR PLEASURE IN DOING THINGS: NOT AT ALL
SUM OF ALL RESPONSES TO PHQ9 QUESTIONS 1 AND 2: 0
2. FEELING DOWN, DEPRESSED OR HOPELESS: NOT AT ALL
SUM OF ALL RESPONSES TO PHQ9 QUESTIONS 1 AND 2: 0
CLINICAL INTERPRETATION OF PHQ2 SCORE: NO FURTHER SCREENING NEEDED
